# Patient Record
Sex: MALE | Race: WHITE | NOT HISPANIC OR LATINO | URBAN - METROPOLITAN AREA
[De-identification: names, ages, dates, MRNs, and addresses within clinical notes are randomized per-mention and may not be internally consistent; named-entity substitution may affect disease eponyms.]

---

## 2017-10-03 ENCOUNTER — INPATIENT (INPATIENT)
Facility: HOSPITAL | Age: 82
LOS: 1 days | Discharge: HOME HEALTH | DRG: 292 | End: 2017-10-05
Payer: MEDICARE

## 2017-10-03 VITALS
WEIGHT: 240.08 LBS | TEMPERATURE: 99 F | HEART RATE: 78 BPM | OXYGEN SATURATION: 98 % | RESPIRATION RATE: 22 BRPM | SYSTOLIC BLOOD PRESSURE: 233 MMHG | HEIGHT: 65 IN | DIASTOLIC BLOOD PRESSURE: 100 MMHG

## 2017-10-03 DIAGNOSIS — I50.9 HEART FAILURE, UNSPECIFIED: ICD-10-CM

## 2017-10-03 DIAGNOSIS — Z98.890 OTHER SPECIFIED POSTPROCEDURAL STATES: Chronic | ICD-10-CM

## 2017-10-03 DIAGNOSIS — I10 ESSENTIAL (PRIMARY) HYPERTENSION: ICD-10-CM

## 2017-10-03 LAB
ALBUMIN SERPL ELPH-MCNC: 4 G/DL — SIGNIFICANT CHANGE UP (ref 3.3–5.2)
ALP SERPL-CCNC: 99 U/L — SIGNIFICANT CHANGE UP (ref 40–120)
ALT FLD-CCNC: 12 U/L — SIGNIFICANT CHANGE UP
ANION GAP SERPL CALC-SCNC: 12 MMOL/L — SIGNIFICANT CHANGE UP (ref 5–17)
APTT BLD: 29.7 SEC — SIGNIFICANT CHANGE UP (ref 27.5–37.4)
AST SERPL-CCNC: 16 U/L — SIGNIFICANT CHANGE UP
BASOPHILS # BLD AUTO: 0 K/UL — SIGNIFICANT CHANGE UP (ref 0–0.2)
BASOPHILS NFR BLD AUTO: 0.6 % — SIGNIFICANT CHANGE UP (ref 0–2)
BILIRUB SERPL-MCNC: 0.4 MG/DL — SIGNIFICANT CHANGE UP (ref 0.4–2)
BUN SERPL-MCNC: 25 MG/DL — HIGH (ref 8–20)
CALCIUM SERPL-MCNC: 8.8 MG/DL — SIGNIFICANT CHANGE UP (ref 8.6–10.2)
CHLORIDE SERPL-SCNC: 106 MMOL/L — SIGNIFICANT CHANGE UP (ref 98–107)
CO2 SERPL-SCNC: 24 MMOL/L — SIGNIFICANT CHANGE UP (ref 22–29)
CREAT SERPL-MCNC: 1.62 MG/DL — HIGH (ref 0.5–1.3)
EOSINOPHIL # BLD AUTO: 0.1 K/UL — SIGNIFICANT CHANGE UP (ref 0–0.5)
EOSINOPHIL NFR BLD AUTO: 1.1 % — SIGNIFICANT CHANGE UP (ref 0–5)
GLUCOSE SERPL-MCNC: 115 MG/DL — SIGNIFICANT CHANGE UP (ref 70–115)
HCT VFR BLD CALC: 42.3 % — SIGNIFICANT CHANGE UP (ref 42–52)
HGB BLD-MCNC: 13.4 G/DL — LOW (ref 14–18)
INR BLD: 0.99 RATIO — SIGNIFICANT CHANGE UP (ref 0.88–1.16)
LYMPHOCYTES # BLD AUTO: 1.6 K/UL — SIGNIFICANT CHANGE UP (ref 1–4.8)
LYMPHOCYTES # BLD AUTO: 24.6 % — SIGNIFICANT CHANGE UP (ref 20–55)
MCHC RBC-ENTMCNC: 30.9 PG — SIGNIFICANT CHANGE UP (ref 27–31)
MCHC RBC-ENTMCNC: 31.7 G/DL — LOW (ref 32–36)
MCV RBC AUTO: 97.5 FL — HIGH (ref 80–94)
MONOCYTES # BLD AUTO: 0.7 K/UL — SIGNIFICANT CHANGE UP (ref 0–0.8)
MONOCYTES NFR BLD AUTO: 10.8 % — HIGH (ref 3–10)
NEUTROPHILS # BLD AUTO: 4.2 K/UL — SIGNIFICANT CHANGE UP (ref 1.8–8)
NEUTROPHILS NFR BLD AUTO: 62.4 % — SIGNIFICANT CHANGE UP (ref 37–73)
NT-PROBNP SERPL-SCNC: 3792 PG/ML — HIGH (ref 0–300)
PLATELET # BLD AUTO: 272 K/UL — SIGNIFICANT CHANGE UP (ref 150–400)
POTASSIUM SERPL-MCNC: 4.6 MMOL/L — SIGNIFICANT CHANGE UP (ref 3.5–5.3)
POTASSIUM SERPL-SCNC: 4.6 MMOL/L — SIGNIFICANT CHANGE UP (ref 3.5–5.3)
PROT SERPL-MCNC: 7.3 G/DL — SIGNIFICANT CHANGE UP (ref 6.6–8.7)
PROTHROM AB SERPL-ACNC: 10.9 SEC — SIGNIFICANT CHANGE UP (ref 9.8–12.7)
RBC # BLD: 4.34 M/UL — LOW (ref 4.6–6.2)
RBC # FLD: 14.3 % — SIGNIFICANT CHANGE UP (ref 11–15.6)
SODIUM SERPL-SCNC: 142 MMOL/L — SIGNIFICANT CHANGE UP (ref 135–145)
TROPONIN T SERPL-MCNC: 0.02 NG/ML — SIGNIFICANT CHANGE UP (ref 0–0.06)
WBC # BLD: 6.7 K/UL — SIGNIFICANT CHANGE UP (ref 4.8–10.8)
WBC # FLD AUTO: 6.7 K/UL — SIGNIFICANT CHANGE UP (ref 4.8–10.8)

## 2017-10-03 PROCEDURE — 93970 EXTREMITY STUDY: CPT | Mod: 26

## 2017-10-03 PROCEDURE — 99223 1ST HOSP IP/OBS HIGH 75: CPT

## 2017-10-03 PROCEDURE — 76775 US EXAM ABDO BACK WALL LIM: CPT | Mod: 26

## 2017-10-03 PROCEDURE — 99285 EMERGENCY DEPT VISIT HI MDM: CPT

## 2017-10-03 PROCEDURE — 71010: CPT | Mod: 26

## 2017-10-03 PROCEDURE — 93010 ELECTROCARDIOGRAM REPORT: CPT

## 2017-10-03 RX ORDER — SIMVASTATIN 20 MG/1
20 TABLET, FILM COATED ORAL AT BEDTIME
Qty: 0 | Refills: 0 | Status: DISCONTINUED | OUTPATIENT
Start: 2017-10-03 | End: 2017-10-05

## 2017-10-03 RX ORDER — CARVEDILOL PHOSPHATE 80 MG/1
3.12 CAPSULE, EXTENDED RELEASE ORAL EVERY 12 HOURS
Qty: 0 | Refills: 0 | Status: DISCONTINUED | OUTPATIENT
Start: 2017-10-03 | End: 2017-10-04

## 2017-10-03 RX ORDER — FUROSEMIDE 40 MG
40 TABLET ORAL ONCE
Qty: 0 | Refills: 0 | Status: COMPLETED | OUTPATIENT
Start: 2017-10-03 | End: 2017-10-03

## 2017-10-03 RX ORDER — METOPROLOL TARTRATE 50 MG
50 TABLET ORAL DAILY
Qty: 0 | Refills: 0 | Status: DISCONTINUED | OUTPATIENT
Start: 2017-10-03 | End: 2017-10-03

## 2017-10-03 RX ORDER — HEPARIN SODIUM 5000 [USP'U]/ML
5000 INJECTION INTRAVENOUS; SUBCUTANEOUS EVERY 12 HOURS
Qty: 0 | Refills: 0 | Status: DISCONTINUED | OUTPATIENT
Start: 2017-10-03 | End: 2017-10-05

## 2017-10-03 RX ORDER — ACETAMINOPHEN 500 MG
650 TABLET ORAL EVERY 6 HOURS
Qty: 0 | Refills: 0 | Status: DISCONTINUED | OUTPATIENT
Start: 2017-10-03 | End: 2017-10-05

## 2017-10-03 RX ORDER — INFLUENZA VIRUS VACCINE 15; 15; 15; 15 UG/.5ML; UG/.5ML; UG/.5ML; UG/.5ML
0.5 SUSPENSION INTRAMUSCULAR ONCE
Qty: 0 | Refills: 0 | Status: COMPLETED | OUTPATIENT
Start: 2017-10-03 | End: 2017-10-05

## 2017-10-03 RX ORDER — POTASSIUM CHLORIDE 20 MEQ
20 PACKET (EA) ORAL DAILY
Qty: 0 | Refills: 0 | Status: DISCONTINUED | OUTPATIENT
Start: 2017-10-03 | End: 2017-10-05

## 2017-10-03 RX ORDER — FUROSEMIDE 40 MG
0 TABLET ORAL
Qty: 0 | Refills: 0 | COMMUNITY

## 2017-10-03 RX ORDER — ISOSORBIDE MONONITRATE 60 MG/1
30 TABLET, EXTENDED RELEASE ORAL DAILY
Qty: 0 | Refills: 0 | Status: DISCONTINUED | OUTPATIENT
Start: 2017-10-03 | End: 2017-10-05

## 2017-10-03 RX ORDER — ASPIRIN/CALCIUM CARB/MAGNESIUM 325 MG
325 TABLET ORAL DAILY
Qty: 0 | Refills: 0 | Status: DISCONTINUED | OUTPATIENT
Start: 2017-10-03 | End: 2017-10-05

## 2017-10-03 RX ORDER — FUROSEMIDE 40 MG
40 TABLET ORAL EVERY 12 HOURS
Qty: 0 | Refills: 0 | Status: DISCONTINUED | OUTPATIENT
Start: 2017-10-03 | End: 2017-10-05

## 2017-10-03 RX ORDER — HYDRALAZINE HCL 50 MG
10 TABLET ORAL EVERY 12 HOURS
Qty: 0 | Refills: 0 | Status: DISCONTINUED | OUTPATIENT
Start: 2017-10-03 | End: 2017-10-05

## 2017-10-03 RX ADMIN — ISOSORBIDE MONONITRATE 30 MILLIGRAM(S): 60 TABLET, EXTENDED RELEASE ORAL at 18:21

## 2017-10-03 RX ADMIN — HEPARIN SODIUM 5000 UNIT(S): 5000 INJECTION INTRAVENOUS; SUBCUTANEOUS at 18:22

## 2017-10-03 RX ADMIN — Medication 40 MILLIGRAM(S): at 13:54

## 2017-10-03 RX ADMIN — CARVEDILOL PHOSPHATE 3.12 MILLIGRAM(S): 80 CAPSULE, EXTENDED RELEASE ORAL at 18:21

## 2017-10-03 RX ADMIN — Medication 40 MILLIGRAM(S): at 18:22

## 2017-10-03 RX ADMIN — SIMVASTATIN 20 MILLIGRAM(S): 20 TABLET, FILM COATED ORAL at 23:32

## 2017-10-03 RX ADMIN — Medication 10 MILLIGRAM(S): at 18:21

## 2017-10-03 NOTE — ED PROVIDER NOTE - PROGRESS NOTE DETAILS
Dr Person rec admit for diuresis and echo and Dr Dumont case discussed and she will admit 4 tower and direct further care 4 tower

## 2017-10-03 NOTE — ED ADULT NURSE NOTE - CHIEF COMPLAINT QUOTE
BIBA from home c/o  " heart palpitations"  this morning " my chest feels heavy"  . As per family pt lives  alone at home , stopped taking b/p meds two weeks ago , hypertensive in /100, + SOB, family requesting DNR/DNI

## 2017-10-03 NOTE — H&P ADULT - ATTENDING COMMENTS
PT consult prior to discharge as patient lives alone. Plan of care discussed with patient and family at bedside.

## 2017-10-03 NOTE — ED PROVIDER NOTE - MUSCULOSKELETAL, MLM
Spine appears normal, range of motion is not limited, no muscle or joint tenderness 4 plus leg lymphedema which seems chronic with crusted skin

## 2017-10-03 NOTE — H&P ADULT - HISTORY OF PRESENT ILLNESS
89 yr old male with hypertension and CHF presents with worsening shortness of breath for 2 weeks, worse over the last 2 days. States that his legs were getting swollen and last night could barely breath and had to sit in a chair due to shortness of breath. No chest pain, states he was short of breath just by walking to the bathroom from his bed, which is a few feet. No palpitations. Has been having leg swelling for 2 weeks. He reports stopping Lasix about 4 weeks ago as he had to be up all night to go to the bathroom. No fever, chills, nausea, vomiting, abdominal pain. Lives alone, uses a walker at home for ambulation.    PMD: DEYANIRA Santa

## 2017-10-03 NOTE — ED ADULT NURSE NOTE - OBJECTIVE STATEMENT
PT BIBA c/o chest pressure that started last night. pt states he walked to the bathroom last night and was SOB and was having a hard time breathing, Pt states he sat outside to get some air and then when he was able to catch his breath went back in. Pt states he continued to have chest  pressure and this morning family called ambulance. Pt states he has been non - compliant with meds for over two weeks, states he ran out of meds and decided since he is already 89 why bother to continue to take them. Pt is A & Ox3, pt's respirations are even however pt SOB when talking. Pt has bilateral lower extremity edema with the "orange rind" appearance, skin color is pale with pinkish/red areas. Pt evaluated by MD waiting for test results.

## 2017-10-03 NOTE — CONSULT NOTE ADULT - ASSESSMENT
89 yo male with HF, renal insufficiency, left and right sided failure and murmur of aortic valve stenosis.   His BP is elevated. He is not compliant with meds.  CXR with bilateral pleural effusion, cardiomegaly and PVC    1. Start diuretic therapy  2. I/Os  3. Serrano and daily weight  4. TTE  5. Renal US  6. Start Imdur and hydralazine for now. add coreg.   7. Lower ext venous Duplex and DVT prophylaxis

## 2017-10-03 NOTE — ED PROVIDER NOTE - OBJECTIVE STATEMENT
89 y/o male states he has ah/o htn and chronic leg edema and he was on atenolol and lasix and potassium and he stopped it 2 weeks ago because he did not want to get up at night to urinate and over the past 3 days he has progressive sob and today cp 89 y/o male states he has ah/o htn and chronic leg edema and he was on atenolol and lasix and potassium and he stopped it 2 weeks ago because he did not want to get up at night to urinate and over the past 3 days he has progressive sob and today cp which is a little better now

## 2017-10-03 NOTE — H&P ADULT - ASSESSMENT
89 yr old male with hypertension and CHF presents with worsening shortness of breath for 2 weeks, worse over last 2 days, with leg swelling. + Orthopnea. Labs and imaging noted, + pleural effusions on CXR.

## 2017-10-03 NOTE — ED ADULT TRIAGE NOTE - CHIEF COMPLAINT QUOTE
BIBA from home c/o  " heart palpitations"  this morning " my chest feels heavy"  . As per family pt lives  alone at home , stopped taking b/p meds two weeks ago , hypertensive in /100, + SOB, family requesting DNR/DNI BIBA from home c/o  " heart palpitations"  this morning " my chest feels heavy"  . As per family pt lives  alone at home , stopped taking b/p meds two weeks ago , hypertensive in /100, + SOB, b/l lower extremity edema with non draining blisters ,  family requesting DNR/DNI

## 2017-10-03 NOTE — CONSULT NOTE ADULT - SUBJECTIVE AND OBJECTIVE BOX
Patient is a 89y old  Male who presents with a chief complaint of SOB, PND and leg edema    HPI:  89 yo male with hypertension, chronic venous insufficiency, not compliant with medications.  He has been weaker over the past few months, Walks with a walker. Stop taking meds since he was going to bathroom too many times.   Early this AM while walking to bathroom and back, only a few step started to feel short of breath, needed to sit in the chairs and had chest tightness. He came to ED, has received IV lasix, urinated and feels better. His legs are getting larger. Difficult to ambulate. In ED /100. Now BP is 168/70.     PAST MEDICAL & SURGICAL HISTORY:  HTN (hypertension)  CHF (congestive heart failure)  No significant past surgical history    Allergies  No Known Allergies    MEDICATIONS:  Not taking any meds at home    FAMILY HISTORY:  [place]    SOCIAL HISTORY:  Stopped smoking 75 years ago. no etoh or drug use    REVIEW OF SYSTEMS:  CONSTITUTIONAL: No fever, - weight loss, + fatigue  EYES: No eye pain, visual disturbances, or discharge  ENMT:  + difficulty hearing,  - tinnitus, vertigo; No sinus or throat pain  NECK: No pain or stiffness  RESPIRATORY: as per HPI, + cough, no sputum production   CARDIOVASCULAR: No chest pain, palpitations, passing out, dizziness, + leg swelling  GASTROINTESTINAL: No abdominal or epigastric pain. No nausea, vomiting, or hematemesis; No diarrhea or constipation. No melena or hematochezia.  GENITOURINARY: No dysuria, frequency, hematuria, + incontinence  NEUROLOGICAL: No headaches, memory loss, loss of strength, numbness, or tremors  SKIN: No itching, burning, rashes, or lesions   LYMPH Nodes: No enlarged glands  ENDOCRINE: No heat or cold intolerance; No hair loss  MUSCULOSKELETAL: + joint pain and swelling  PSYCHIATRIC: No depression, anxiety, mood swings, or difficulty sleeping  HEME/LYMPH: No easy bruising, or bleeding gums  ALLERY AND IMMUNOLOGIC: No hives or eczema	    Vital Signs Last 24 Hrs  T(C): 37 (03 Oct 2017 12:17), Max: 37 (03 Oct 2017 12:17)  T(F): 98.6 (03 Oct 2017 12:17), Max: 98.6 (03 Oct 2017 12:17)  HR: 80 (03 Oct 2017 12:45) (78 - 80)  BP: 179/79 (03 Oct 2017 12:45) (179/79 - 233/100)  BP(mean): --  RR: 18 (03 Oct 2017 12:45) (18 - 22)  SpO2: 99% (03 Oct 2017 12:45) (98% - 99%)    Daily Height in cm: 165.1 (03 Oct 2017 12:17)    Daily     PHYSICAL EXAM:  Appearance: Normal, well nourished	  HEENT:   Normal oral mucosa, PERRL, EOMI, sclera non-icteric	  Lymphatic: No cervical lymphadenopathy  Cardiovascular: Normal S1 S2, 3/6 sm right second ics, tense edema mid calf, skin changes c.w. chronic venous stasis.   Respiratory: Lungs clear to auscultation	  Psychiatry: A & O x 3, Mood & affect appropriate  Gastrointestinal:  Soft, Non-tender, + BS, no bruits	  Skin: No rashes, No ecchymoses, No cyanosis  Neurologic: Grossly non-focal with full strength in all four extremities  Extremities: Normal range of motion, No clubbing  Vascular: Peripheral pulses palpable 2+ bilaterally    ECG: NSR 82 BPM, 82 BPM, non-specific ST changes.     LABS:                      13.4   6.7   )-----------( 272      ( 03 Oct 2017 12:38 )             42.3     10-03    142  |  106  |  25.0<H>  ----------------------------<  115  4.6   |  24.0  |  1.62<H>    Ca    8.8      03 Oct 2017 12:38    TPro  7.3  /  Alb  4.0  /  TBili  0.4  /  DBili  x   /  AST  16  /  ALT  12  /  AlkPhos  99  10-03    CARDIAC MARKERS ( 03 Oct 2017 12:38 )  x     / 0.02 ng/mL / x     / x     / x          PT/INR - ( 03 Oct 2017 12:38 )   PT: 10.9 sec;   INR: 0.99 ratio     PTT - ( 03 Oct 2017 12:38 )  PTT:29.7 sec    BNPSerum Pro-Brain Natriuretic Peptide: 3792 pg/mL (10-03 @ 12:38)  Serum Pro-Brain Natriuretic Peptide: 3792 pg/mL (10-03-17 @ 12:38)    RADIOLOGY & ADDITIONAL STUDIES:

## 2017-10-03 NOTE — ED ADULT NURSE REASSESSMENT NOTE - NS ED NURSE REASSESS COMMENT FT1
Pt medicated as per MD orders, tolerated well. Pt provided urinal. Pt's family remains at bedside and pt offers no complaints at this time. Will continue to monitor.

## 2017-10-03 NOTE — H&P ADULT - PROBLEM SELECTOR PLAN 1
Strict I/O, daily weights. Lasix IV Q 12hrly, cardiology consulted by ED. ECHO, start aspirin and statin. Supplemental oxygen as needed. Monitor renal function and electrolytes.

## 2017-10-04 DIAGNOSIS — N17.9 ACUTE KIDNEY FAILURE, UNSPECIFIED: ICD-10-CM

## 2017-10-04 LAB
ANION GAP SERPL CALC-SCNC: 14 MMOL/L — SIGNIFICANT CHANGE UP (ref 5–17)
BASOPHILS # BLD AUTO: 0 K/UL — SIGNIFICANT CHANGE UP (ref 0–0.2)
BASOPHILS NFR BLD AUTO: 0.3 % — SIGNIFICANT CHANGE UP (ref 0–2)
BUN SERPL-MCNC: 30 MG/DL — HIGH (ref 8–20)
CALCIUM SERPL-MCNC: 8.6 MG/DL — SIGNIFICANT CHANGE UP (ref 8.6–10.2)
CHLORIDE SERPL-SCNC: 104 MMOL/L — SIGNIFICANT CHANGE UP (ref 98–107)
CHOLEST SERPL-MCNC: 225 MG/DL — HIGH (ref 110–199)
CO2 SERPL-SCNC: 24 MMOL/L — SIGNIFICANT CHANGE UP (ref 22–29)
CREAT SERPL-MCNC: 1.7 MG/DL — HIGH (ref 0.5–1.3)
EOSINOPHIL # BLD AUTO: 0.1 K/UL — SIGNIFICANT CHANGE UP (ref 0–0.5)
EOSINOPHIL NFR BLD AUTO: 1.1 % — SIGNIFICANT CHANGE UP (ref 0–5)
GLUCOSE SERPL-MCNC: 104 MG/DL — SIGNIFICANT CHANGE UP (ref 70–115)
HCT VFR BLD CALC: 38.8 % — LOW (ref 42–52)
HDLC SERPL-MCNC: 54 MG/DL — LOW
HGB BLD-MCNC: 12.4 G/DL — LOW (ref 14–18)
INR BLD: 0.98 RATIO — SIGNIFICANT CHANGE UP (ref 0.88–1.16)
LIPID PNL WITH DIRECT LDL SERPL: 150 MG/DL — SIGNIFICANT CHANGE UP
LYMPHOCYTES # BLD AUTO: 1.2 K/UL — SIGNIFICANT CHANGE UP (ref 1–4.8)
LYMPHOCYTES # BLD AUTO: 15.3 % — LOW (ref 20–55)
MAGNESIUM SERPL-MCNC: 2 MG/DL — SIGNIFICANT CHANGE UP (ref 1.6–2.6)
MCHC RBC-ENTMCNC: 30.8 PG — SIGNIFICANT CHANGE UP (ref 27–31)
MCHC RBC-ENTMCNC: 32 G/DL — SIGNIFICANT CHANGE UP (ref 32–36)
MCV RBC AUTO: 96.3 FL — HIGH (ref 80–94)
MONOCYTES # BLD AUTO: 1.1 K/UL — HIGH (ref 0–0.8)
MONOCYTES NFR BLD AUTO: 14.6 % — HIGH (ref 3–10)
NEUTROPHILS # BLD AUTO: 5.2 K/UL — SIGNIFICANT CHANGE UP (ref 1.8–8)
NEUTROPHILS NFR BLD AUTO: 68.4 % — SIGNIFICANT CHANGE UP (ref 37–73)
PHOSPHATE SERPL-MCNC: 3.4 MG/DL — SIGNIFICANT CHANGE UP (ref 2.4–4.7)
PLATELET # BLD AUTO: 252 K/UL — SIGNIFICANT CHANGE UP (ref 150–400)
POTASSIUM SERPL-MCNC: 4.2 MMOL/L — SIGNIFICANT CHANGE UP (ref 3.5–5.3)
POTASSIUM SERPL-SCNC: 4.2 MMOL/L — SIGNIFICANT CHANGE UP (ref 3.5–5.3)
PROTHROM AB SERPL-ACNC: 10.8 SEC — SIGNIFICANT CHANGE UP (ref 9.8–12.7)
RBC # BLD: 4.03 M/UL — LOW (ref 4.6–6.2)
RBC # FLD: 14.3 % — SIGNIFICANT CHANGE UP (ref 11–15.6)
SODIUM SERPL-SCNC: 142 MMOL/L — SIGNIFICANT CHANGE UP (ref 135–145)
T3 SERPL-MCNC: 65 NG/DL — LOW (ref 80–200)
T4 AB SER-ACNC: 3.6 UG/DL — LOW (ref 4.5–12)
TOTAL CHOLESTEROL/HDL RATIO MEASUREMENT: 4 RATIO — SIGNIFICANT CHANGE UP (ref 3.4–9.6)
TRIGL SERPL-MCNC: 104 MG/DL — SIGNIFICANT CHANGE UP (ref 10–200)
WBC # BLD: 7.6 K/UL — SIGNIFICANT CHANGE UP (ref 4.8–10.8)
WBC # FLD AUTO: 7.6 K/UL — SIGNIFICANT CHANGE UP (ref 4.8–10.8)

## 2017-10-04 PROCEDURE — 99233 SBSQ HOSP IP/OBS HIGH 50: CPT

## 2017-10-04 PROCEDURE — 99232 SBSQ HOSP IP/OBS MODERATE 35: CPT

## 2017-10-04 RX ORDER — LEVOTHYROXINE SODIUM 125 MCG
50 TABLET ORAL DAILY
Qty: 0 | Refills: 0 | Status: DISCONTINUED | OUTPATIENT
Start: 2017-10-04 | End: 2017-10-05

## 2017-10-04 RX ORDER — CARVEDILOL PHOSPHATE 80 MG/1
6.25 CAPSULE, EXTENDED RELEASE ORAL EVERY 12 HOURS
Qty: 0 | Refills: 0 | Status: DISCONTINUED | OUTPATIENT
Start: 2017-10-04 | End: 2017-10-05

## 2017-10-04 RX ADMIN — CARVEDILOL PHOSPHATE 3.12 MILLIGRAM(S): 80 CAPSULE, EXTENDED RELEASE ORAL at 06:09

## 2017-10-04 RX ADMIN — HEPARIN SODIUM 5000 UNIT(S): 5000 INJECTION INTRAVENOUS; SUBCUTANEOUS at 06:09

## 2017-10-04 RX ADMIN — ISOSORBIDE MONONITRATE 30 MILLIGRAM(S): 60 TABLET, EXTENDED RELEASE ORAL at 11:21

## 2017-10-04 RX ADMIN — Medication 40 MILLIGRAM(S): at 06:09

## 2017-10-04 RX ADMIN — Medication 20 MILLIEQUIVALENT(S): at 11:21

## 2017-10-04 RX ADMIN — SIMVASTATIN 20 MILLIGRAM(S): 20 TABLET, FILM COATED ORAL at 21:52

## 2017-10-04 RX ADMIN — Medication 10 MILLIGRAM(S): at 06:09

## 2017-10-04 RX ADMIN — Medication 40 MILLIGRAM(S): at 17:15

## 2017-10-04 RX ADMIN — Medication 10 MILLIGRAM(S): at 17:16

## 2017-10-04 RX ADMIN — HEPARIN SODIUM 5000 UNIT(S): 5000 INJECTION INTRAVENOUS; SUBCUTANEOUS at 17:15

## 2017-10-04 RX ADMIN — CARVEDILOL PHOSPHATE 6.25 MILLIGRAM(S): 80 CAPSULE, EXTENDED RELEASE ORAL at 17:15

## 2017-10-04 NOTE — PROGRESS NOTE ADULT - SUBJECTIVE AND OBJECTIVE BOX
CHIEF COMPLAINT: Patient is a 89y old  Male who presents with a chief complaint of shortness of breath.   He is diuresing .no cp, pnd or orthopnea over night   No DVT on US  Medical renal dz on renal us    Allergies  No Known Allergies    MEDICATIONS:  carvedilol 3.125 milliGRAM(s) Oral every 12 hours  furosemide   Injectable 40 milliGRAM(s) IV Push every 12 hours  hydrALAZINE 10 milliGRAM(s) Oral every 12 hours  isosorbide   mononitrate ER Tablet (IMDUR) 30 milliGRAM(s) Oral daily  acetaminophen   Tablet 650 milliGRAM(s) Oral every 6 hours PRN  acetaminophen   Tablet. 650 milliGRAM(s) Oral every 6 hours PRN  aspirin buffered 325 milliGRAM(s) Oral daily  levothyroxine 50 MICROGram(s) Oral daily  simvastatin 20 milliGRAM(s) Oral at bedtime  heparin  Injectable 5000 Unit(s) SubCutaneous every 12 hours  influenza   Vaccine 0.5 milliLiter(s) IntraMuscular once  potassium chloride    Tablet ER 20 milliEquivalent(s) Oral daily    PHYSICAL EXAM:  T(C): 37 (10-04-17 @ 06:09), Max: 37.2 (10-03-17 @ 21:59)  HR: 73 (10-04-17 @ 06:09) (70 - 88)  BP: 140/70 (10-04-17 @ 06:09) (133/69 - 233/100)  RR: 18 (10-04-17 @ 06:09) (18 - 29)  SpO2: 100% (10-04-17 @ 06:09) (98% - 100%)  Wt(kg): --    I&O's Summary    03 Oct 2017 07:01  -  04 Oct 2017 07:00  --------------------------------------------------------  IN: 0 mL / OUT: 800 mL / NET: -800 mL        Daily Height in cm: 165.1 (03 Oct 2017 12:17)    Daily Weight in k (04 Oct 2017 01:54)  Appearance: Normal	  HEENT:   NC/AT  Eye: Pink Conjunctiva  Lungs: CTA B/L  CVS: RRR, Normal S1 and S2, No Edema  Pulses: Normal distal pulses.  Neuro: A&O x3    TELEMETRY: nsr, apc, pvc    LABS:	 	                      12.4   7.6   )-----------( 252      ( 04 Oct 2017 05:36 )             38.8     10-04    142  |  104  |  30.0<H>  ----------------------------<  104  4.2   |  24.0  |  1.70<H>    Ca    8.6      04 Oct 2017 05:36  Phos  3.4     10-04  Mg     2.0     10-04    TPro  7.3  /  Alb  4.0  /  TBili  0.4  /  DBili  x   /  AST  16  /  ALT  12  /  AlkPhos  99  10-03    proBNP: Serum Pro-Brain Natriuretic Peptide: 3792 pg/mL (10-03 @ 12:38)    TSH: Thyroid Stimulating Hormone, Serum: 4.27 uIU/mL (10-03 @ 12:38)    ASSESSMENT/PLAN:

## 2017-10-04 NOTE — PROGRESS NOTE ADULT - PROBLEM SELECTOR PLAN 1
Feels better, continue IV Lasix, continue Coreg, Imdur, aspirin and statin. Cardiology follow up noted. Awaiting ECHO.

## 2017-10-04 NOTE — PROGRESS NOTE ADULT - ASSESSMENT
89 yr old male with hypertension and CHF presents with worsening shortness of breath for 2 weeks, worse over last 2 days, with leg swelling and orthopnea. Noted to have pleural effusions on CXR. He was started on IV Lasix. Patient was not compliant for Lasix for over 4 weeks. Cardiology consulted, he was started on Coreg and Imdur. ECHO ordered. Noted to have elevated D dimer, lower extremity US was done, negative for DVT.

## 2017-10-04 NOTE — PROGRESS NOTE ADULT - SUBJECTIVE AND OBJECTIVE BOX
INTERVAL HPI/OVERNIGHT EVENTS:    CC: acute CHF, hypothyroid, hypertension, ? MATILDE and CKD    Feels better, less short of breath    Vital Signs Last 24 Hrs  T(C): 37 (04 Oct 2017 06:09), Max: 37.2 (03 Oct 2017 21:59)  T(F): 98.6 (04 Oct 2017 06:09), Max: 98.9 (03 Oct 2017 21:59)  HR: 73 (04 Oct 2017 06:09) (70 - 88)  BP: 140/70 (04 Oct 2017 06:09) (133/69 - 233/100)  BP(mean): --  RR: 18 (04 Oct 2017 06:09) (18 - 29)  SpO2: 100% (04 Oct 2017 06:09) (98% - 100%)    PHYSICAL EXAM:    GENERAL: Not in distress, alert  CHEST/LUNG: b/l air entry, decreased in bases  HEART: Regular   ABDOMEN: Soft, BS+  EXTREMITIES:  3+ edema, chronic dry skin changes    MEDICATIONS  (STANDING):  aspirin buffered 325 milliGRAM(s) Oral daily  carvedilol 6.25 milliGRAM(s) Oral every 12 hours  furosemide   Injectable 40 milliGRAM(s) IV Push every 12 hours  heparin  Injectable 5000 Unit(s) SubCutaneous every 12 hours  hydrALAZINE 10 milliGRAM(s) Oral every 12 hours  influenza   Vaccine 0.5 milliLiter(s) IntraMuscular once  isosorbide   mononitrate ER Tablet (IMDUR) 30 milliGRAM(s) Oral daily  levothyroxine 50 MICROGram(s) Oral daily  potassium chloride    Tablet ER 20 milliEquivalent(s) Oral daily  simvastatin 20 milliGRAM(s) Oral at bedtime    MEDICATIONS  (PRN):  acetaminophen   Tablet 650 milliGRAM(s) Oral every 6 hours PRN For Temp greater than 38 C (100.4 F)  acetaminophen   Tablet. 650 milliGRAM(s) Oral every 6 hours PRN Moderate Pain (4 - 6)      Allergies    No Known Allergies    Intolerances          LABS:                          12.4   7.6   )-----------( 252      ( 04 Oct 2017 05:36 )             38.8     10-04    142  |  104  |  30.0<H>  ----------------------------<  104  4.2   |  24.0  |  1.70<H>    Ca    8.6      04 Oct 2017 05:36  Phos  3.4     10-04  Mg     2.0     10-04    TPro  7.3  /  Alb  4.0  /  TBili  0.4  /  DBili  x   /  AST  16  /  ALT  12  /  AlkPhos  99  10-03    PT/INR - ( 04 Oct 2017 05:36 )   PT: 10.8 sec;   INR: 0.98 ratio         PTT - ( 03 Oct 2017 12:38 )  PTT:29.7 sec      RADIOLOGY & ADDITIONAL TESTS:

## 2017-10-04 NOTE — PROGRESS NOTE ADULT - ASSESSMENT
89 yo male with CRI, volume overload and left and right sided failure.     Biventricular failure. cont with current meds  increase coreg  echo pending  Renal dz, Cr increasing, on more day of lasix IV bid.  monitor electrolytes.

## 2017-10-05 VITALS
SYSTOLIC BLOOD PRESSURE: 120 MMHG | OXYGEN SATURATION: 93 % | RESPIRATION RATE: 18 BRPM | TEMPERATURE: 98 F | HEART RATE: 83 BPM | DIASTOLIC BLOOD PRESSURE: 60 MMHG

## 2017-10-05 LAB
ANION GAP SERPL CALC-SCNC: 15 MMOL/L — SIGNIFICANT CHANGE UP (ref 5–17)
BUN SERPL-MCNC: 40 MG/DL — HIGH (ref 8–20)
CALCIUM SERPL-MCNC: 9.4 MG/DL — SIGNIFICANT CHANGE UP (ref 8.6–10.2)
CHLORIDE SERPL-SCNC: 104 MMOL/L — SIGNIFICANT CHANGE UP (ref 98–107)
CO2 SERPL-SCNC: 26 MMOL/L — SIGNIFICANT CHANGE UP (ref 22–29)
CREAT SERPL-MCNC: 1.9 MG/DL — HIGH (ref 0.5–1.3)
GLUCOSE SERPL-MCNC: 107 MG/DL — SIGNIFICANT CHANGE UP (ref 70–115)
MAGNESIUM SERPL-MCNC: 2.1 MG/DL — SIGNIFICANT CHANGE UP (ref 1.6–2.6)
POTASSIUM SERPL-MCNC: 4.4 MMOL/L — SIGNIFICANT CHANGE UP (ref 3.5–5.3)
POTASSIUM SERPL-SCNC: 4.4 MMOL/L — SIGNIFICANT CHANGE UP (ref 3.5–5.3)
SODIUM SERPL-SCNC: 145 MMOL/L — SIGNIFICANT CHANGE UP (ref 135–145)

## 2017-10-05 PROCEDURE — 90686 IIV4 VACC NO PRSV 0.5 ML IM: CPT

## 2017-10-05 PROCEDURE — 80061 LIPID PANEL: CPT

## 2017-10-05 PROCEDURE — 84436 ASSAY OF TOTAL THYROXINE: CPT

## 2017-10-05 PROCEDURE — 96374 THER/PROPH/DIAG INJ IV PUSH: CPT | Mod: XU

## 2017-10-05 PROCEDURE — 85730 THROMBOPLASTIN TIME PARTIAL: CPT

## 2017-10-05 PROCEDURE — 84100 ASSAY OF PHOSPHORUS: CPT

## 2017-10-05 PROCEDURE — 84484 ASSAY OF TROPONIN QUANT: CPT

## 2017-10-05 PROCEDURE — 99285 EMERGENCY DEPT VISIT HI MDM: CPT | Mod: 25

## 2017-10-05 PROCEDURE — 80053 COMPREHEN METABOLIC PANEL: CPT

## 2017-10-05 PROCEDURE — 83880 ASSAY OF NATRIURETIC PEPTIDE: CPT

## 2017-10-05 PROCEDURE — 85379 FIBRIN DEGRADATION QUANT: CPT

## 2017-10-05 PROCEDURE — G0378: CPT

## 2017-10-05 PROCEDURE — 84480 ASSAY TRIIODOTHYRONINE (T3): CPT

## 2017-10-05 PROCEDURE — 76775 US EXAM ABDO BACK WALL LIM: CPT

## 2017-10-05 PROCEDURE — 99232 SBSQ HOSP IP/OBS MODERATE 35: CPT

## 2017-10-05 PROCEDURE — 99239 HOSP IP/OBS DSCHRG MGMT >30: CPT

## 2017-10-05 PROCEDURE — 93005 ELECTROCARDIOGRAM TRACING: CPT

## 2017-10-05 PROCEDURE — 36415 COLL VENOUS BLD VENIPUNCTURE: CPT

## 2017-10-05 PROCEDURE — 84443 ASSAY THYROID STIM HORMONE: CPT

## 2017-10-05 PROCEDURE — 93970 EXTREMITY STUDY: CPT

## 2017-10-05 PROCEDURE — 80048 BASIC METABOLIC PNL TOTAL CA: CPT

## 2017-10-05 PROCEDURE — 85027 COMPLETE CBC AUTOMATED: CPT

## 2017-10-05 PROCEDURE — 83735 ASSAY OF MAGNESIUM: CPT

## 2017-10-05 PROCEDURE — 97163 PT EVAL HIGH COMPLEX 45 MIN: CPT

## 2017-10-05 PROCEDURE — 93306 TTE W/DOPPLER COMPLETE: CPT

## 2017-10-05 PROCEDURE — 85610 PROTHROMBIN TIME: CPT

## 2017-10-05 PROCEDURE — 71045 X-RAY EXAM CHEST 1 VIEW: CPT

## 2017-10-05 RX ORDER — CARVEDILOL PHOSPHATE 80 MG/1
1 CAPSULE, EXTENDED RELEASE ORAL
Qty: 60 | Refills: 0
Start: 2017-10-05 | End: 2017-11-04

## 2017-10-05 RX ORDER — SIMVASTATIN 20 MG/1
1 TABLET, FILM COATED ORAL
Qty: 30 | Refills: 0
Start: 2017-10-05 | End: 2017-11-04

## 2017-10-05 RX ORDER — LEVOTHYROXINE SODIUM 125 MCG
1 TABLET ORAL
Qty: 30 | Refills: 0
Start: 2017-10-05 | End: 2017-11-04

## 2017-10-05 RX ORDER — POTASSIUM CHLORIDE 20 MEQ
1 PACKET (EA) ORAL
Qty: 30 | Refills: 0
Start: 2017-10-05 | End: 2017-11-04

## 2017-10-05 RX ORDER — FUROSEMIDE 40 MG
1 TABLET ORAL
Qty: 0 | Refills: 0 | COMMUNITY

## 2017-10-05 RX ORDER — HYDRALAZINE HCL 50 MG
1 TABLET ORAL
Qty: 90 | Refills: 0
Start: 2017-10-05 | End: 2017-11-04

## 2017-10-05 RX ORDER — METOPROLOL TARTRATE 50 MG
1 TABLET ORAL
Qty: 0 | Refills: 0 | COMMUNITY

## 2017-10-05 RX ORDER — ISOSORBIDE MONONITRATE 60 MG/1
1 TABLET, EXTENDED RELEASE ORAL
Qty: 30 | Refills: 0
Start: 2017-10-05 | End: 2017-11-04

## 2017-10-05 RX ORDER — INFLUENZA VIRUS VACCINE 15; 15; 15; 15 UG/.5ML; UG/.5ML; UG/.5ML; UG/.5ML
0.5 SUSPENSION INTRAMUSCULAR ONCE
Qty: 0 | Refills: 0 | Status: COMPLETED | OUTPATIENT
Start: 2017-10-05 | End: 2017-10-05

## 2017-10-05 RX ORDER — HYDRALAZINE HCL 50 MG
25 TABLET ORAL EVERY 8 HOURS
Qty: 0 | Refills: 0 | Status: DISCONTINUED | OUTPATIENT
Start: 2017-10-05 | End: 2017-10-05

## 2017-10-05 RX ORDER — FUROSEMIDE 40 MG
1 TABLET ORAL
Qty: 30 | Refills: 0
Start: 2017-10-05 | End: 2017-11-04

## 2017-10-05 RX ORDER — FUROSEMIDE 40 MG
40 TABLET ORAL DAILY
Qty: 0 | Refills: 0 | Status: DISCONTINUED | OUTPATIENT
Start: 2017-10-05 | End: 2017-10-05

## 2017-10-05 RX ORDER — ASPIRIN/CALCIUM CARB/MAGNESIUM 324 MG
1 TABLET ORAL
Qty: 30 | Refills: 0
Start: 2017-10-05 | End: 2017-11-04

## 2017-10-05 RX ORDER — POTASSIUM CHLORIDE 20 MEQ
1 PACKET (EA) ORAL
Qty: 0 | Refills: 0 | COMMUNITY

## 2017-10-05 RX ADMIN — CARVEDILOL PHOSPHATE 6.25 MILLIGRAM(S): 80 CAPSULE, EXTENDED RELEASE ORAL at 17:33

## 2017-10-05 RX ADMIN — HEPARIN SODIUM 5000 UNIT(S): 5000 INJECTION INTRAVENOUS; SUBCUTANEOUS at 05:32

## 2017-10-05 RX ADMIN — ISOSORBIDE MONONITRATE 30 MILLIGRAM(S): 60 TABLET, EXTENDED RELEASE ORAL at 11:19

## 2017-10-05 RX ADMIN — Medication 20 MILLIEQUIVALENT(S): at 13:32

## 2017-10-05 RX ADMIN — Medication 40 MILLIGRAM(S): at 05:32

## 2017-10-05 RX ADMIN — Medication 50 MICROGRAM(S): at 05:32

## 2017-10-05 RX ADMIN — CARVEDILOL PHOSPHATE 6.25 MILLIGRAM(S): 80 CAPSULE, EXTENDED RELEASE ORAL at 05:31

## 2017-10-05 RX ADMIN — Medication 10 MILLIGRAM(S): at 05:32

## 2017-10-05 RX ADMIN — INFLUENZA VIRUS VACCINE 0.5 MILLILITER(S): 15; 15; 15; 15 SUSPENSION INTRAMUSCULAR at 14:30

## 2017-10-05 RX ADMIN — Medication 325 MILLIGRAM(S): at 17:50

## 2017-10-05 NOTE — DISCHARGE NOTE ADULT - NS AS DC HF EDUCATION INSTRUCTIONS
Activities as tolerated/Call Primary Care Provider for follow-up after discharge/Report weight gain of 2 or more pounds in one day or 3 or more pounds in one week, worsening shortness of breath, fatigue, weakness, increased swelling of hands and feet to primary care provider/Low salt diet/Monitor Weight Daily

## 2017-10-05 NOTE — DISCHARGE NOTE ADULT - PLAN OF CARE
Remain free of shortness of breath Continue Lasix, cardiology follow up. Continue medications as instructed. Continue Synthroid.

## 2017-10-05 NOTE — PROGRESS NOTE ADULT - ATTENDING COMMENTS
1.
Plan of care discussed with patient, RN and son. Stable for discharge home.
Plan of care discussed with patient.

## 2017-10-05 NOTE — PHYSICAL THERAPY INITIAL EVALUATION ADULT - PERTINENT HX OF CURRENT PROBLEM, REHAB EVAL
2 yr old male with hypertension and CHF presents with worsening shortness of breath for 2 weeks, worse over last 2 days, with leg swelling and orthopnea. Noted to have pleural effusions on CXR, BLE Dopplers negative for DVT, pt admitted for CHF

## 2017-10-05 NOTE — PROGRESS NOTE ADULT - SUBJECTIVE AND OBJECTIVE BOX
CHIEF COMPLAINT:Patient is a 89y old  Male who presents with a chief complaint of shortness of breath, Biventricular failure.  Leg edema and lymphedema.   Breathing is improved.     Allergies  No Known Allergies  	  MEDICATIONS:  carvedilol 6.25 milliGRAM(s) Oral every 12 hours  furosemide   Injectable 40 milliGRAM(s) IV Push every 12 hours  hydrALAZINE 10 milliGRAM(s) Oral every 12 hours  isosorbide   mononitrate ER Tablet (IMDUR) 30 milliGRAM(s) Oral daily  acetaminophen   Tablet 650 milliGRAM(s) Oral every 6 hours PRN  acetaminophen   Tablet. 650 milliGRAM(s) Oral every 6 hours PRN  aspirin buffered 325 milliGRAM(s) Oral daily  levothyroxine 50 MICROGram(s) Oral daily  simvastatin 20 milliGRAM(s) Oral at bedtime  heparin  Injectable 5000 Unit(s) SubCutaneous every 12 hours  influenza   Vaccine 0.5 milliLiter(s) IntraMuscular once  potassium chloride    Tablet ER 20 milliEquivalent(s) Oral daily    PHYSICAL EXAM:  T(C): 36.8 (10-05-17 @ 05:27), Max: 37 (10-04-17 @ 17:14)  HR: 70 (10-05-17 @ 05:27) (69 - 82)  BP: 175/72 (10-05-17 @ 05:27) (99/52 - 175/72)  RR: 18 (10-05-17 @ 05:27) (17 - 18)  SpO2: 97% (10-05-17 @ 05:27) (97% - 99%)  Wt(kg): --    I&O's Summary    04 Oct 2017 07:01  -  05 Oct 2017 07:00  --------------------------------------------------------  IN: 180 mL / OUT: 0 mL / NET: 180 mL    Appearance: Normal	  HEENT:   NC/AT  Eye: Pink Conjunctiva  Lungs: CTA B/L  CVS: RRR, Normal S1 and S2, lymphedema,; edema on thigh is improved.   Neuro: A&O x3    TELEMETRY: No events	                           12.4   7.6   )-----------( 252      ( 04 Oct 2017 05:36 )             38.8     10-05    145  |  104  |  40.0<H>  ----------------------------<  107  4.4   |  26.0  |  1.90<H>    Ca    9.4      05 Oct 2017 06:40  Phos  3.4     10-04  Mg     2.1     10-05    TPro  7.3  /  Alb  4.0  /  TBili  0.4  /  DBili  x   /  AST  16  /  ALT  12  /  AlkPhos  99  10-03    proBNP:   Lipid Profile:   HgA1c:   TSH:     ASSESSMENT/PLAN:

## 2017-10-05 NOTE — DISCHARGE NOTE ADULT - MEDICATION SUMMARY - MEDICATIONS TO TAKE
I will START or STAY ON the medications listed below when I get home from the hospital:    aspirin 325 mg oral delayed release tablet  -- 1 tab(s) by mouth once a day   -- Swallow whole.  Do not crush.  Take with food or milk.    -- Indication: For CHF (congestive heart failure)    isosorbide mononitrate 30 mg oral tablet, extended release  -- 1 tab(s) by mouth once a day  -- Indication: For CHF (congestive heart failure)    simvastatin 20 mg oral tablet  -- 1 tab(s) by mouth once a day (at bedtime)  -- Indication: For CHF (congestive heart failure)    carvedilol 6.25 mg oral tablet  -- 1 tab(s) by mouth every 12 hours  -- Indication: For CHF (congestive heart failure)    furosemide 40 mg oral tablet  -- 1 tab(s) by mouth once a day  -- Indication: For CHF (congestive heart failure)    potassium chloride 20 mEq oral tablet, extended release  -- 1 tab(s) by mouth once a day  -- Indication: For supplement    levothyroxine 50 mcg (0.05 mg) oral tablet  -- 1 tab(s) by mouth once a day  -- Indication: For Hypothyroid    hydrALAZINE 25 mg oral tablet  -- 1 tab(s) by mouth every 8 hours  -- Indication: For HTN (hypertension)

## 2017-10-05 NOTE — DISCHARGE NOTE ADULT - HOSPITAL COURSE
89 yr old male with hypertension and CHF presents with worsening shortness of breath for 2 weeks, worse over last 2 days, with leg swelling and orthopnea. Noted to have pleural effusions on CXR. He was started on IV Lasix. Patient was not compliant for Lasix for over 4 weeks. Cardiology consulted, he was started on Coreg and Imdur. ECHO ordered. Noted to have elevated D dimer, lower extremity US was done, negative for DVT. His ECHO was suggestive of diastolic CHF. His symptoms improved and Lasix was changed to PO. Antihypertensives were adjusted. PT evaluation was advised [facility], but patient insisted on going home.  Home care was arranged. Per PMD his baseline creatinine is at 1.8. His course was otherwise uneventful and he is stable for discharge home.    Spent > 35 mins in discharge plan and documentation. 89 yr old male with hypertension and CHF presents with worsening shortness of breath for 2 weeks, worse over last 2 days, with leg swelling and orthopnea. Noted to have pleural effusions on CXR. He was started on IV Lasix. Patient was not compliant for Lasix for over 4 weeks. Cardiology consulted, he was started on Coreg and Imdur. ECHO ordered. Noted to have elevated D dimer, lower extremity US was done, negative for DVT. His ECHO was suggestive of diastolic CHF. His symptoms improved and Lasix was changed to PO. Antihypertensives were adjusted. PT evaluation was advised [facility], but patient insisted on going home.  He declined home care. Per PMD his baseline creatinine is at 1.8. His course was otherwise uneventful and he is stable for discharge home.    Spent > 35 mins in discharge plan and documentation. 89 yr old male with hypertension and CHF presents with worsening shortness of breath for 2 weeks, worse over last 2 days, with leg swelling and orthopnea. Noted to have pleural effusions on CXR. He was started on IV Lasix. Patient was not compliant for Lasix for over 4 weeks. Cardiology consulted, he was started on Coreg and Imdur. ECHO ordered. Noted to have elevated D dimer, lower extremity US was done, negative for DVT. His ECHO was suggestive of diastolic CHF. His symptoms improved and Lasix was changed to PO. Antihypertensives were adjusted. PT evaluation was advised [facility], but patient insisted on going home.  Home care arranged. Per PMD his baseline creatinine is at 1.8. His course was otherwise uneventful and he is stable for discharge home.    Spent > 35 mins in discharge plan and documentation.

## 2017-10-05 NOTE — DISCHARGE NOTE ADULT - PATIENT PORTAL LINK FT
“You can access the [facility] Patient Portal, offered by [facility], by registering with the following website: [email or web address]”

## 2017-10-05 NOTE — PHYSICAL THERAPY INITIAL EVALUATION ADULT - GENERAL OBSERVATIONS, REHAB EVAL
pt received seated on EOB with SUGAR Banegas, + O2nc + telemetry, c/o bilateral Hip pain "from being in the bed", pt agreeable to PT

## 2017-10-05 NOTE — DISCHARGE NOTE ADULT - ADDITIONAL INSTRUCTIONS
Continue medications as instructed, follow up with PMD in 1 week. Monitor weight daily. Return to ED for worsening symptoms.

## 2017-10-05 NOTE — PHYSICAL THERAPY INITIAL EVALUATION ADULT - CRITERIA FOR SKILLED THERAPEUTIC INTERVENTIONS
[facility] vs Home with 24 hour assist/supervision, RW and Home PT/impairments found/anticipated discharge recommendation/rehab potential

## 2017-10-05 NOTE — PHYSICAL THERAPY INITIAL EVALUATION ADULT - IMPAIRMENTS FOUND, PT EVAL
posture/gait, locomotion, and balance/ventilation and respiration/gas exchange/aerobic capacity/endurance/anthropometric characteristics/muscle strength

## 2017-10-05 NOTE — PROGRESS NOTE ADULT - SUBJECTIVE AND OBJECTIVE BOX
INTERVAL HPI/OVERNIGHT EVENTS:    CC: diastolic CHF, hypertension, CKD, hypothyroid    No overnight events, feels better. Denies complaints.     Vital Signs Last 24 Hrs  T(C): 36.9 (05 Oct 2017 13:28), Max: 37 (04 Oct 2017 17:14)  T(F): 98.4 (05 Oct 2017 13:28), Max: 98.6 (04 Oct 2017 17:14)  HR: 78 (05 Oct 2017 13:28) (70 - 82)  BP: 94/40 (05 Oct 2017 13:28) (94/40 - 175/72)  BP(mean): --  RR: 16 (05 Oct 2017 13:28) (16 - 18)  SpO2: 92% (05 Oct 2017 13:28) (92% - 98%)    PHYSICAL EXAM:    GENERAL: Not in distress, alert  CHEST/LUNG: b/l air entry, decreased in bases, improved.  HEART: Regular   ABDOMEN: Soft, BS+  EXTREMITIES:  2+ edema, improved, dry skin changes.    MEDICATIONS  (STANDING):  aspirin buffered 325 milliGRAM(s) Oral daily  carvedilol 6.25 milliGRAM(s) Oral every 12 hours  furosemide    Tablet 40 milliGRAM(s) Oral daily  heparin  Injectable 5000 Unit(s) SubCutaneous every 12 hours  hydrALAZINE 25 milliGRAM(s) Oral every 8 hours  influenza   Vaccine 0.5 milliLiter(s) IntraMuscular once  influenza  Vaccine (HIGH DOSE) 0.5 milliLiter(s) IntraMuscular once  isosorbide   mononitrate ER Tablet (IMDUR) 30 milliGRAM(s) Oral daily  levothyroxine 50 MICROGram(s) Oral daily  potassium chloride    Tablet ER 20 milliEquivalent(s) Oral daily  simvastatin 20 milliGRAM(s) Oral at bedtime    MEDICATIONS  (PRN):  acetaminophen   Tablet 650 milliGRAM(s) Oral every 6 hours PRN For Temp greater than 38 C (100.4 F)  acetaminophen   Tablet. 650 milliGRAM(s) Oral every 6 hours PRN Moderate Pain (4 - 6)      Allergies    No Known Allergies    Intolerances          LABS:                          12.4   7.6   )-----------( 252      ( 04 Oct 2017 05:36 )             38.8     10-05    145  |  104  |  40.0<H>  ----------------------------<  107  4.4   |  26.0  |  1.90<H>    Ca    9.4      05 Oct 2017 06:40  Phos  3.4     10-04  Mg     2.1     10-05      PT/INR - ( 04 Oct 2017 05:36 )   PT: 10.8 sec;   INR: 0.98 ratio               RADIOLOGY & ADDITIONAL TESTS:

## 2017-10-05 NOTE — PHYSICAL THERAPY INITIAL EVALUATION ADULT - ADDITIONAL COMMENTS
Pt lives alone in a private home. 3 +1 steps to enter with handrails, no steps inside. Pt was independent PTA with RW but required assist on stairs. Pt's family lives nearby, Pt states they check in on him "2-3 times a week" Pt owns rollator and shower chair.

## 2017-10-05 NOTE — PROGRESS NOTE ADULT - ASSESSMENT
89 yr old male with hypertension and CHF presents with worsening shortness of breath for 2 weeks, worse over last 2 days, with leg swelling and orthopnea. Noted to have pleural effusions on CXR. He was started on IV Lasix. Patient was not compliant for Lasix for over 4 weeks. Cardiology consulted, he was started on Coreg and Imdur. ECHO ordered. Noted to have elevated D dimer, lower extremity US was done, negative for DVT. ECHO showed diastolic CHF. His medications as instructed. Creatinine baseline 1.9 as per PMD office. Refused HUMBERTO.

## 2017-10-05 NOTE — DISCHARGE NOTE ADULT - CARE PLAN
Principal Discharge DX:	CHF (congestive heart failure)  Goal:	Remain free of shortness of breath  Instructions for follow-up, activity and diet:	Continue Lasix, cardiology follow up.  Secondary Diagnosis:	HTN (hypertension)  Instructions for follow-up, activity and diet:	Continue medications as instructed.  Secondary Diagnosis:	Hypothyroid  Instructions for follow-up, activity and diet:	Continue Synthroid.

## 2017-10-05 NOTE — DISCHARGE NOTE ADULT - CARE PROVIDER_API CALL
Hern[facility] Santa), Family Medicine  [address] [place] [address]  Phone: [phone]  Fax: [phone]

## 2017-10-05 NOTE — PROGRESS NOTE ADULT - ASSESSMENT
1. BP elevated, increase hydralazine to 25 mg tid  2. Normal LV function. Diastolic dysfunction.   3. Change lasix to daily  4. No need for ischemic klein in this 89 yo male  5. elevated Cr. PT can fu with pmd as outpt next week  6. ok to dc home today from my standpoint fu in 2 weeks   thank you.

## 2019-07-22 ENCOUNTER — INPATIENT (INPATIENT)
Facility: HOSPITAL | Age: 84
LOS: 8 days | Discharge: HOME | DRG: 871 | End: 2019-07-31
Attending: GENERAL ACUTE CARE HOSPITAL | Admitting: INTERNAL MEDICINE
Payer: MEDICARE

## 2019-07-22 VITALS
TEMPERATURE: 99 F | RESPIRATION RATE: 18 BRPM | SYSTOLIC BLOOD PRESSURE: 91 MMHG | OXYGEN SATURATION: 93 % | DIASTOLIC BLOOD PRESSURE: 56 MMHG | HEIGHT: 66 IN | WEIGHT: 229.94 LBS | HEART RATE: 83 BPM

## 2019-07-22 DIAGNOSIS — Z98.890 OTHER SPECIFIED POSTPROCEDURAL STATES: Chronic | ICD-10-CM

## 2019-07-22 LAB
ALBUMIN SERPL ELPH-MCNC: 3.8 G/DL — SIGNIFICANT CHANGE UP (ref 3.3–5.2)
ALP SERPL-CCNC: 77 U/L — SIGNIFICANT CHANGE UP (ref 40–120)
ALT FLD-CCNC: 13 U/L — SIGNIFICANT CHANGE UP
ANION GAP SERPL CALC-SCNC: 16 MMOL/L — SIGNIFICANT CHANGE UP (ref 5–17)
AST SERPL-CCNC: 34 U/L — SIGNIFICANT CHANGE UP
BILIRUB SERPL-MCNC: 0.7 MG/DL — SIGNIFICANT CHANGE UP (ref 0.4–2)
BUN SERPL-MCNC: 50 MG/DL — HIGH (ref 8–20)
CALCIUM SERPL-MCNC: 9.2 MG/DL — SIGNIFICANT CHANGE UP (ref 8.6–10.2)
CHLORIDE SERPL-SCNC: 101 MMOL/L — SIGNIFICANT CHANGE UP (ref 98–107)
CO2 SERPL-SCNC: 21 MMOL/L — LOW (ref 22–29)
CREAT SERPL-MCNC: 2.45 MG/DL — HIGH (ref 0.5–1.3)
GLUCOSE SERPL-MCNC: 113 MG/DL — SIGNIFICANT CHANGE UP (ref 70–115)
HCT VFR BLD CALC: 40.7 % — SIGNIFICANT CHANGE UP (ref 39–50)
HGB BLD-MCNC: 13.1 G/DL — SIGNIFICANT CHANGE UP (ref 13–17)
MCHC RBC-ENTMCNC: 31.3 PG — SIGNIFICANT CHANGE UP (ref 27–34)
MCHC RBC-ENTMCNC: 32.2 GM/DL — SIGNIFICANT CHANGE UP (ref 32–36)
MCV RBC AUTO: 97.4 FL — SIGNIFICANT CHANGE UP (ref 80–100)
PLATELET # BLD AUTO: 202 K/UL — SIGNIFICANT CHANGE UP (ref 150–400)
POTASSIUM SERPL-MCNC: 4.8 MMOL/L — SIGNIFICANT CHANGE UP (ref 3.5–5.3)
POTASSIUM SERPL-SCNC: 4.8 MMOL/L — SIGNIFICANT CHANGE UP (ref 3.5–5.3)
PROT SERPL-MCNC: 7.5 G/DL — SIGNIFICANT CHANGE UP (ref 6.6–8.7)
RBC # BLD: 4.18 M/UL — LOW (ref 4.2–5.8)
RBC # FLD: 13.5 % — SIGNIFICANT CHANGE UP (ref 10.3–14.5)
SODIUM SERPL-SCNC: 138 MMOL/L — SIGNIFICANT CHANGE UP (ref 135–145)

## 2019-07-22 PROCEDURE — 71045 X-RAY EXAM CHEST 1 VIEW: CPT | Mod: 26

## 2019-07-22 PROCEDURE — 70486 CT MAXILLOFACIAL W/O DYE: CPT | Mod: 26

## 2019-07-22 PROCEDURE — 99285 EMERGENCY DEPT VISIT HI MDM: CPT

## 2019-07-22 PROCEDURE — 70450 CT HEAD/BRAIN W/O DYE: CPT | Mod: 26

## 2019-07-22 PROCEDURE — 93010 ELECTROCARDIOGRAM REPORT: CPT

## 2019-07-22 NOTE — ED PROVIDER NOTE - OBJECTIVE STATEMENT
The patient is a 89 y.o. M with hx of CHF complaining of fall from standing this morning. The patient fell when attempting to get up from the toilet. He states his legs felt weak however he denies any dizziness or lightheadedness. He was unable to lift himself up and remained down for three hours before crawling to the nearest phone. He denies any incontinence and LOC. He states this has never happened before. The patient typically ambulates with a walker because he has bilateral lower extremity edema secondary CHF. He is completely independent, lives at home alone, adheres to his daily medication regimen, and orders food and drinks from online. He has a visiting RN come to his home but has not seen any other health care providers in "many years" and does not leave his home. He denies any syncope, presyncopal episodes, headache, fevers, sweats, chills, blurry vision, changes in vision, tinnitus, sore throat, chest pain, shortness of breath, abdominal pain, nausea, vomiting, diarrhea, dysuria, hematuria, numbness, or tingling.

## 2019-07-22 NOTE — ED ADULT NURSE NOTE - OBJECTIVE STATEMENT
patient fell at home, stated that his legs became weak and fell on knees. c/o b/l knee pain and b/l feet pain. patient lives at home and takes care of himself.

## 2019-07-22 NOTE — ED PROVIDER NOTE - PHYSICAL EXAMINATION
General: well appearing, NAD  Head:  NC, AT, no bruising, no bleeding/drainage/erythema  Eyes: EOMI, PERRL, no scleral icterus, no racoon eyes  Ears: no erythema/drainage, no battles sign  Neck: left sided paraspinal tenderness, no cervical spinal tenderness, no pain with extension/flexion/rotational movements  Nose: midline, no bleeding/drainage  Throat: uvula midline, no lesions, MMM  Cardiac: RRR, no m/r/g, bilateral lower extremity edema  Respiratory: CTABL, no wheezes/rales, equal chest wall expansion  Abdomen: soft, ND, NT, BSx4, no rebound tenderness, no guarding, nonperitonitic  MSK/Vascular: full ROM, distal pulses intact, soft compartments, warm extremities, BLLE  Neuro: AAOx3, negative pronator drift, strength 5/5, sensation to light touch intact, finger to nose coordination intact, cranial nerves 2-12 intact  Psych: calm, cooperative, normal affect General: well appearing, NAD  Head:  NC, AT, no bruising, no bleeding/drainage/erythema  Eyes: EOMI, PERRL, no scleral icterus, no racoon eyes  Ears: no erythema/drainage, no battles sign  Neck: left sided paraspinal tenderness, no cervical spinal tenderness, no pain with extension/flexion/rotational movements  Nose: midline, no bleeding/drainage  Throat: uvula midline, no lesions, MMM  Cardiac: RRR, no m/r/g, bilateral 4+ lower extremity edema  Respiratory: CTABL, no wheezes/rales, equal chest wall expansion  Abdomen: soft, ND, NT, BSx4, no rebound tenderness, no guarding, nonperitonitic  MSK/Vascular: full ROM, distal pulses intact, soft compartments, warm extremities, BLLE  Neuro: AAOx3, negative pronator drift, strength 5/5, sensation to light touch intact, finger to nose coordination intact, cranial nerves 2-12 intact  Psych: calm, cooperative, normal affect

## 2019-07-22 NOTE — ED PROVIDER NOTE - CLINICAL SUMMARY MEDICAL DECISION MAKING FREE TEXT BOX
Pt is a 89 y.o. M who fell at home while attempting to stand from the toilet, landing on his left face. The patient is neurovascularly intact on physical exam and has no signs of injury. Patient alert, oriented x3 and denies any LOC or incontinence. Labs including CK and troponin ordered, imaging to assess head and face ordered. Will follow up.

## 2019-07-22 NOTE — ED ADULT TRIAGE NOTE - CHIEF COMPLAINT QUOTE
Pt brought in from home for eval s/p fall while in bathroom. Pt tripped and fell while in the bathroom. Pt denies LOC or hitting head. Pt was found on floor after 3-4 hours when pt was able to get to a phone. Pt with +b/l lower ext edema.

## 2019-07-22 NOTE — ED PROVIDER NOTE - PMH
CHF (congestive heart failure)    HTN (hypertension)    Hypothyroidism, unspecified type    Prostate enlargement

## 2019-07-22 NOTE — ED ADULT NURSE NOTE - NSIMPLEMENTINTERV_GEN_ALL_ED
Implemented All Universal Safety Interventions:  [place] to call system. Call bell, personal items and telephone within reach. Instruct patient to call for assistance. Room bathroom lighting operational. Non-slip footwear when patient is off stretcher. Physically safe environment: no spills, clutter or unnecessary equipment. Stretcher in lowest position, wheels locked, appropriate side rails in place.

## 2019-07-22 NOTE — ED PROVIDER NOTE - ATTENDING CONTRIBUTION TO CARE
I personally saw the patient with the resident, and completed the key components of the history and physical exam. I then discussed the management plan with the resident.  89 year old male with PMH chronic lymphedema, CHF, HTN presents following fall. He states that he was on toilet, tried to get up and his legs felt extremely heavy due to his lymphedema, and they gave out on him, and he fell to floor. No LOC, did not have chest pain, SOB, palpitations. He wa sunable to get himself up for several hours  Gen: NAD, well appearing  CV: RRR, 4+ pitting edema b/l  Pul: CTA b/l  Abd: Soft, non-distended, non-tender  Neuro: no focal deficits  Pt with tristen, rhabdo, will admit

## 2019-07-22 NOTE — ED PROVIDER NOTE - CARE PLAN
Principal Discharge DX:	MATILDE (acute kidney injury)  Secondary Diagnosis:	Non-traumatic rhabdomyolysis  Secondary Diagnosis:	Fall, initial encounter

## 2019-07-23 DIAGNOSIS — N17.9 ACUTE KIDNEY FAILURE, UNSPECIFIED: ICD-10-CM

## 2019-07-23 PROBLEM — I10 ESSENTIAL (PRIMARY) HYPERTENSION: Chronic | Status: ACTIVE | Noted: 2017-10-03

## 2019-07-23 PROBLEM — I50.9 HEART FAILURE, UNSPECIFIED: Chronic | Status: ACTIVE | Noted: 2017-10-03

## 2019-07-23 LAB
ANION GAP SERPL CALC-SCNC: 16 MMOL/L — SIGNIFICANT CHANGE UP (ref 5–17)
ANISOCYTOSIS BLD QL: SLIGHT — SIGNIFICANT CHANGE UP
ANISOCYTOSIS BLD QL: SLIGHT — SIGNIFICANT CHANGE UP
APPEARANCE UR: CLEAR — SIGNIFICANT CHANGE UP
BACTERIA # UR AUTO: ABNORMAL
BASE EXCESS BLDA CALC-SCNC: -2.3 MMOL/L — LOW (ref -2–2)
BASOPHILS # BLD AUTO: 0 K/UL — SIGNIFICANT CHANGE UP (ref 0–0.2)
BASOPHILS # BLD AUTO: 0 K/UL — SIGNIFICANT CHANGE UP (ref 0–0.2)
BASOPHILS # BLD AUTO: 0.04 K/UL — SIGNIFICANT CHANGE UP (ref 0–0.2)
BASOPHILS NFR BLD AUTO: 0 % — SIGNIFICANT CHANGE UP (ref 0–2)
BASOPHILS NFR BLD AUTO: 0 % — SIGNIFICANT CHANGE UP (ref 0–2)
BASOPHILS NFR BLD AUTO: 0.3 % — SIGNIFICANT CHANGE UP (ref 0–2)
BILIRUB UR-MCNC: NEGATIVE — SIGNIFICANT CHANGE UP
BLOOD GAS COMMENTS ARTERIAL: SIGNIFICANT CHANGE UP
BUN SERPL-MCNC: 50 MG/DL — HIGH (ref 8–20)
BURR CELLS BLD QL SMEAR: PRESENT — SIGNIFICANT CHANGE UP
CALCIUM SERPL-MCNC: 9.2 MG/DL — SIGNIFICANT CHANGE UP (ref 8.6–10.2)
CHLORIDE SERPL-SCNC: 102 MMOL/L — SIGNIFICANT CHANGE UP (ref 98–107)
CK MB CFR SERPL CALC: 6.5 NG/ML — SIGNIFICANT CHANGE UP (ref 0–6.7)
CK SERPL-CCNC: 1368 U/L — HIGH (ref 30–200)
CO2 SERPL-SCNC: 21 MMOL/L — LOW (ref 22–29)
COLOR SPEC: YELLOW — SIGNIFICANT CHANGE UP
CREAT SERPL-MCNC: 2.35 MG/DL — HIGH (ref 0.5–1.3)
DACRYOCYTES BLD QL SMEAR: SIGNIFICANT CHANGE UP
DIFF PNL FLD: ABNORMAL
EOSINOPHIL # BLD AUTO: 0 K/UL — SIGNIFICANT CHANGE UP (ref 0–0.5)
EOSINOPHIL # BLD AUTO: 0.32 K/UL — SIGNIFICANT CHANGE UP (ref 0–0.5)
EOSINOPHIL # BLD AUTO: 0.4 K/UL — SIGNIFICANT CHANGE UP (ref 0–0.5)
EOSINOPHIL NFR BLD AUTO: 0 % — SIGNIFICANT CHANGE UP (ref 0–6)
EOSINOPHIL NFR BLD AUTO: 2.4 % — SIGNIFICANT CHANGE UP (ref 0–6)
EOSINOPHIL NFR BLD AUTO: 3 % — SIGNIFICANT CHANGE UP (ref 0–6)
EPI CELLS # UR: SIGNIFICANT CHANGE UP
GAS PNL BLDA: SIGNIFICANT CHANGE UP
GLUCOSE SERPL-MCNC: 108 MG/DL — SIGNIFICANT CHANGE UP (ref 70–115)
GLUCOSE UR QL: NEGATIVE MG/DL — SIGNIFICANT CHANGE UP
GRAN CASTS # UR COMP ASSIST: ABNORMAL /LPF
HCO3 BLDA-SCNC: 22 MMOL/L — SIGNIFICANT CHANGE UP (ref 20–26)
HCT VFR BLD CALC: 37.1 % — LOW (ref 39–50)
HCT VFR BLD CALC: 38.8 % — LOW (ref 39–50)
HGB BLD-MCNC: 11.8 G/DL — LOW (ref 13–17)
HGB BLD-MCNC: 12 G/DL — LOW (ref 13–17)
HOROWITZ INDEX BLDA+IHG-RTO: SIGNIFICANT CHANGE UP
IMM GRANULOCYTES NFR BLD AUTO: 0.8 % — SIGNIFICANT CHANGE UP (ref 0–1.5)
KETONES UR-MCNC: ABNORMAL
LACTATE SERPL-SCNC: 2.2 MMOL/L — HIGH (ref 0.5–2)
LEUKOCYTE ESTERASE UR-ACNC: ABNORMAL
LYMPHOCYTES # BLD AUTO: 0.59 K/UL — LOW (ref 1–3.3)
LYMPHOCYTES # BLD AUTO: 1.05 K/UL — SIGNIFICANT CHANGE UP (ref 1–3.3)
LYMPHOCYTES # BLD AUTO: 1.34 K/UL — SIGNIFICANT CHANGE UP (ref 1–3.3)
LYMPHOCYTES # BLD AUTO: 10 % — LOW (ref 13–44)
LYMPHOCYTES # BLD AUTO: 4.3 % — LOW (ref 13–44)
LYMPHOCYTES # BLD AUTO: 6.1 % — LOW (ref 13–44)
MACROCYTES BLD QL: SLIGHT — SIGNIFICANT CHANGE UP
MACROCYTES BLD QL: SLIGHT — SIGNIFICANT CHANGE UP
MANUAL SMEAR VERIFICATION: SIGNIFICANT CHANGE UP
MCHC RBC-ENTMCNC: 30.7 PG — SIGNIFICANT CHANGE UP (ref 27–34)
MCHC RBC-ENTMCNC: 30.8 PG — SIGNIFICANT CHANGE UP (ref 27–34)
MCHC RBC-ENTMCNC: 30.9 GM/DL — LOW (ref 32–36)
MCHC RBC-ENTMCNC: 31.8 GM/DL — LOW (ref 32–36)
MCV RBC AUTO: 96.6 FL — SIGNIFICANT CHANGE UP (ref 80–100)
MCV RBC AUTO: 99.7 FL — SIGNIFICANT CHANGE UP (ref 80–100)
METAMYELOCYTES # FLD: 1 % — HIGH (ref 0–0)
MONOCYTES # BLD AUTO: 0.94 K/UL — HIGH (ref 0–0.9)
MONOCYTES # BLD AUTO: 1.67 K/UL — HIGH (ref 0–0.9)
MONOCYTES # BLD AUTO: 1.94 K/UL — HIGH (ref 0–0.9)
MONOCYTES NFR BLD AUTO: 11.3 % — SIGNIFICANT CHANGE UP (ref 2–14)
MONOCYTES NFR BLD AUTO: 12.3 % — SIGNIFICANT CHANGE UP (ref 2–14)
MONOCYTES NFR BLD AUTO: 7 % — SIGNIFICANT CHANGE UP (ref 2–14)
NEUTROPHILS # BLD AUTO: 10.59 K/UL — HIGH (ref 1.8–7.4)
NEUTROPHILS # BLD AUTO: 10.88 K/UL — HIGH (ref 1.8–7.4)
NEUTROPHILS # BLD AUTO: 14.22 K/UL — HIGH (ref 1.8–7.4)
NEUTROPHILS NFR BLD AUTO: 75 % — SIGNIFICANT CHANGE UP (ref 43–77)
NEUTROPHILS NFR BLD AUTO: 79.9 % — HIGH (ref 43–77)
NEUTROPHILS NFR BLD AUTO: 81.7 % — HIGH (ref 43–77)
NEUTS BAND # BLD: 0.9 % — SIGNIFICANT CHANGE UP (ref 0–8)
NEUTS BAND # BLD: 4 % — SIGNIFICANT CHANGE UP (ref 0–8)
NITRITE UR-MCNC: NEGATIVE — SIGNIFICANT CHANGE UP
NRBC # BLD: 0 /100 — SIGNIFICANT CHANGE UP (ref 0–0)
NT-PROBNP SERPL-SCNC: 4177 PG/ML — HIGH (ref 0–300)
OVALOCYTES BLD QL SMEAR: SLIGHT — SIGNIFICANT CHANGE UP
OVALOCYTES BLD QL SMEAR: SLIGHT — SIGNIFICANT CHANGE UP
PCO2 BLDA: 36 MMHG — SIGNIFICANT CHANGE UP (ref 35–45)
PH BLDA: 7.4 — SIGNIFICANT CHANGE UP (ref 7.35–7.45)
PH UR: 5 — SIGNIFICANT CHANGE UP (ref 5–8)
PLAT MORPH BLD: NORMAL — SIGNIFICANT CHANGE UP
PLAT MORPH BLD: NORMAL — SIGNIFICANT CHANGE UP
PLATELET # BLD AUTO: 199 K/UL — SIGNIFICANT CHANGE UP (ref 150–400)
PLATELET # BLD AUTO: 214 K/UL — SIGNIFICANT CHANGE UP (ref 150–400)
PO2 BLDA: 76 MMHG — LOW (ref 83–108)
POIKILOCYTOSIS BLD QL AUTO: SIGNIFICANT CHANGE UP
POIKILOCYTOSIS BLD QL AUTO: SLIGHT — SIGNIFICANT CHANGE UP
POTASSIUM SERPL-MCNC: 4.3 MMOL/L — SIGNIFICANT CHANGE UP (ref 3.5–5.3)
POTASSIUM SERPL-SCNC: 4.3 MMOL/L — SIGNIFICANT CHANGE UP (ref 3.5–5.3)
PROCALCITONIN SERPL-MCNC: 4.64 NG/ML — HIGH (ref 0.02–0.1)
PROT UR-MCNC: 30 MG/DL
RAPID RVP RESULT: SIGNIFICANT CHANGE UP
RBC # BLD: 3.84 M/UL — LOW (ref 4.2–5.8)
RBC # BLD: 3.89 M/UL — LOW (ref 4.2–5.8)
RBC # FLD: 13.5 % — SIGNIFICANT CHANGE UP (ref 10.3–14.5)
RBC # FLD: 13.8 % — SIGNIFICANT CHANGE UP (ref 10.3–14.5)
RBC BLD AUTO: ABNORMAL
RBC BLD AUTO: ABNORMAL
RBC CASTS # UR COMP ASSIST: ABNORMAL /HPF (ref 0–4)
SAO2 % BLDA: 96 % — SIGNIFICANT CHANGE UP (ref 95–99)
SODIUM SERPL-SCNC: 139 MMOL/L — SIGNIFICANT CHANGE UP (ref 135–145)
SP GR SPEC: 1.01 — SIGNIFICANT CHANGE UP (ref 1.01–1.02)
TROPONIN T SERPL-MCNC: 0.02 NG/ML — SIGNIFICANT CHANGE UP (ref 0–0.06)
TROPONIN T SERPL-MCNC: 0.02 NG/ML — SIGNIFICANT CHANGE UP (ref 0–0.06)
UROBILINOGEN FLD QL: NEGATIVE MG/DL — SIGNIFICANT CHANGE UP
WBC # BLD: 13.41 K/UL — HIGH (ref 3.8–10.5)
WBC # BLD: 13.61 K/UL — HIGH (ref 3.8–10.5)
WBC # BLD: 17.21 K/UL — HIGH (ref 3.8–10.5)
WBC # FLD AUTO: 13.41 K/UL — HIGH (ref 3.8–10.5)
WBC # FLD AUTO: 13.61 K/UL — HIGH (ref 3.8–10.5)
WBC # FLD AUTO: 17.21 K/UL — HIGH (ref 3.8–10.5)
WBC UR QL: SIGNIFICANT CHANGE UP

## 2019-07-23 PROCEDURE — 99223 1ST HOSP IP/OBS HIGH 75: CPT

## 2019-07-23 PROCEDURE — 71250 CT THORAX DX C-: CPT | Mod: 26

## 2019-07-23 PROCEDURE — 12345: CPT | Mod: NC

## 2019-07-23 PROCEDURE — 99497 ADVNCD CARE PLAN 30 MIN: CPT | Mod: 25

## 2019-07-23 RX ORDER — SODIUM CHLORIDE 9 MG/ML
1000 INJECTION INTRAMUSCULAR; INTRAVENOUS; SUBCUTANEOUS
Refills: 0 | Status: DISCONTINUED | OUTPATIENT
Start: 2019-07-23 | End: 2019-07-23

## 2019-07-23 RX ORDER — SACCHAROMYCES BOULARDII 250 MG
250 POWDER IN PACKET (EA) ORAL
Refills: 0 | Status: DISCONTINUED | OUTPATIENT
Start: 2019-07-23 | End: 2019-07-31

## 2019-07-23 RX ORDER — ALBUTEROL 90 UG/1
2.5 AEROSOL, METERED ORAL EVERY 4 HOURS
Refills: 0 | Status: DISCONTINUED | OUTPATIENT
Start: 2019-07-23 | End: 2019-07-26

## 2019-07-23 RX ORDER — FUROSEMIDE 40 MG
40 TABLET ORAL ONCE
Refills: 0 | Status: COMPLETED | OUTPATIENT
Start: 2019-07-23 | End: 2019-07-23

## 2019-07-23 RX ORDER — ISOSORBIDE DINITRATE 5 MG/1
30 TABLET ORAL THREE TIMES A DAY
Refills: 0 | Status: DISCONTINUED | OUTPATIENT
Start: 2019-07-23 | End: 2019-07-23

## 2019-07-23 RX ORDER — HEPARIN SODIUM 5000 [USP'U]/ML
5000 INJECTION INTRAVENOUS; SUBCUTANEOUS EVERY 8 HOURS
Refills: 0 | Status: DISCONTINUED | OUTPATIENT
Start: 2019-07-23 | End: 2019-07-27

## 2019-07-23 RX ORDER — ASPIRIN/CALCIUM CARB/MAGNESIUM 324 MG
325 TABLET ORAL DAILY
Refills: 0 | Status: DISCONTINUED | OUTPATIENT
Start: 2019-07-23 | End: 2019-07-31

## 2019-07-23 RX ORDER — AZITHROMYCIN 500 MG/1
TABLET, FILM COATED ORAL
Refills: 0 | Status: DISCONTINUED | OUTPATIENT
Start: 2019-07-23 | End: 2019-07-25

## 2019-07-23 RX ORDER — CARVEDILOL PHOSPHATE 80 MG/1
12.5 CAPSULE, EXTENDED RELEASE ORAL EVERY 12 HOURS
Refills: 0 | Status: DISCONTINUED | OUTPATIENT
Start: 2019-07-23 | End: 2019-07-31

## 2019-07-23 RX ORDER — AZITHROMYCIN 500 MG/1
500 TABLET, FILM COATED ORAL ONCE
Refills: 0 | Status: COMPLETED | OUTPATIENT
Start: 2019-07-23 | End: 2019-07-23

## 2019-07-23 RX ORDER — FUROSEMIDE 40 MG
40 TABLET ORAL DAILY
Refills: 0 | Status: DISCONTINUED | OUTPATIENT
Start: 2019-07-23 | End: 2019-07-23

## 2019-07-23 RX ORDER — AZITHROMYCIN 500 MG/1
500 TABLET, FILM COATED ORAL EVERY 24 HOURS
Refills: 0 | Status: DISCONTINUED | OUTPATIENT
Start: 2019-07-24 | End: 2019-07-25

## 2019-07-23 RX ORDER — LEVOTHYROXINE SODIUM 125 MCG
50 TABLET ORAL DAILY
Refills: 0 | Status: DISCONTINUED | OUTPATIENT
Start: 2019-07-23 | End: 2019-07-31

## 2019-07-23 RX ORDER — IPRATROPIUM/ALBUTEROL SULFATE 18-103MCG
3 AEROSOL WITH ADAPTER (GRAM) INHALATION EVERY 6 HOURS
Refills: 0 | Status: DISCONTINUED | OUTPATIENT
Start: 2019-07-23 | End: 2019-07-27

## 2019-07-23 RX ORDER — CEFTRIAXONE 500 MG/1
1000 INJECTION, POWDER, FOR SOLUTION INTRAMUSCULAR; INTRAVENOUS EVERY 24 HOURS
Refills: 0 | Status: DISCONTINUED | OUTPATIENT
Start: 2019-07-23 | End: 2019-07-26

## 2019-07-23 RX ORDER — IPRATROPIUM/ALBUTEROL SULFATE 18-103MCG
3 AEROSOL WITH ADAPTER (GRAM) INHALATION EVERY 6 HOURS
Refills: 0 | Status: DISCONTINUED | OUTPATIENT
Start: 2019-07-23 | End: 2019-07-23

## 2019-07-23 RX ORDER — ISOSORBIDE DINITRATE 5 MG/1
30 TABLET ORAL DAILY
Refills: 0 | Status: DISCONTINUED | OUTPATIENT
Start: 2019-07-23 | End: 2019-07-24

## 2019-07-23 RX ADMIN — HEPARIN SODIUM 5000 UNIT(S): 5000 INJECTION INTRAVENOUS; SUBCUTANEOUS at 06:40

## 2019-07-23 RX ADMIN — CEFTRIAXONE 100 MILLIGRAM(S): 500 INJECTION, POWDER, FOR SOLUTION INTRAMUSCULAR; INTRAVENOUS at 06:32

## 2019-07-23 RX ADMIN — Medication 50 MICROGRAM(S): at 06:33

## 2019-07-23 RX ADMIN — Medication 40 MILLIGRAM(S): at 06:33

## 2019-07-23 RX ADMIN — Medication 3 MILLILITER(S): at 03:51

## 2019-07-23 RX ADMIN — Medication 3 MILLILITER(S): at 15:10

## 2019-07-23 RX ADMIN — Medication 325 MILLIGRAM(S): at 09:16

## 2019-07-23 RX ADMIN — Medication 3 MILLILITER(S): at 20:51

## 2019-07-23 RX ADMIN — AZITHROMYCIN 255 MILLIGRAM(S): 500 TABLET, FILM COATED ORAL at 09:13

## 2019-07-23 RX ADMIN — HEPARIN SODIUM 5000 UNIT(S): 5000 INJECTION INTRAVENOUS; SUBCUTANEOUS at 13:01

## 2019-07-23 RX ADMIN — HEPARIN SODIUM 5000 UNIT(S): 5000 INJECTION INTRAVENOUS; SUBCUTANEOUS at 22:45

## 2019-07-23 RX ADMIN — CARVEDILOL PHOSPHATE 12.5 MILLIGRAM(S): 80 CAPSULE, EXTENDED RELEASE ORAL at 06:33

## 2019-07-23 RX ADMIN — Medication 40 MILLIGRAM(S): at 03:44

## 2019-07-23 NOTE — PHYSICAL THERAPY INITIAL EVALUATION ADULT - PERTINENT HX OF CURRENT PROBLEM, REHAB EVAL
Pt previously ambulating modified independent at home with a RW, presents to [facility] s/p fall in the home in which he reports that his legs gave out

## 2019-07-23 NOTE — CHART NOTE - NSCHARTNOTEFT_GEN_A_CORE
Patient seen and examined in day time. Agree with H+P, A/P with the exception or elaboration of the following:    #acute resp failure w/ hypoxia, likely multifactorial sec to CAP w/ sepsis + acute on chronic hfref. stable  sepsis resolved  hf exac likely sec to acute infection, pt adherent w/ meds + diet + lifestyle  on bipap, titrate to venti/NC as tolerated  iv lasix, titrate to oral as tolerated  strict i/o, daily wt  rvp neg, check urine legionella, if neg stop zithro started 7/23  rocephin started 7/23, 5-7d, florastor  trend procal  cxr w/ bibasilar effusion, cannot exclude opacities there, ct chest pending  blood cx pending  echo pending  trop neg x2, fu repeat @ 1300, tele until acs ruled out + echo resulted  ss cardio eval appreciated    #htn. controlled    #tristen sec to atn from rhadomyolysis. worsening, asymptomatic  tristen stable  baseline cr 1.7  ua w/o uti, casts + hematuria from atn  holding aggressive ivf sec to hf exac  serial cpk + bmp  nephro eval if worsening tristen  outpt repeat ua after resolution, if hematuria still present urology eval    #gait instability complicated by fall.   PT eval appreciated, home PT

## 2019-07-23 NOTE — H&P ADULT - NSICDXPASTMEDICALHX_GEN_ALL_CORE_FT
PAST MEDICAL HISTORY:  CHF (congestive heart failure)     HTN (hypertension)     Hypothyroidism, unspecified type     Prostate enlargement

## 2019-07-23 NOTE — H&P ADULT - NSHPLABSRESULTS_GEN_ALL_CORE
13.1   17.21 )-----------( 202      ( 22 Jul 2019 23:19 )             40.7       07-22    138  |  101  |  50.0<H>  ----------------------------<  113  4.8   |  21.0<L>  |  2.45<H>    Ca    9.2      22 Jul 2019 23:18    TPro  7.5  /  Alb  3.8  /  TBili  0.7  /  DBili  x   /  AST  34  /  ALT  13  /  AlkPhos  77  07-22      EKG: NSR, HR 81

## 2019-07-23 NOTE — CONSULT NOTE ADULT - SUBJECTIVE AND OBJECTIVE BOX
[place] [facility]                                                        Office: [address] [address]                                                       Telephone: [phone]. Fax:[phone]                                                              CARDIOLOGY CONSULTATION NOTE                                                                                             Consult requested by:  Dr. Azevedo    Reason for Consultation: CHF    History obtained by: Patient and medical record     obtained: No    Chief complaint:    Patient is a 89y old  Male who presents with a chief complaint of fall, rhabdo, MATILDE (2019 01:20)      HPI: 89 y/o male PMH HFpEF, hypothyroid, HTN, chronic lymphedema presents to ED s/p fall at home. Son at bedside, Pt karla x4, lives independently at home, has visiting RN and PA, monthly. Ambulates with walker, states yesterday went to bathroom, went to stand up, and "legs gave out on me". denies lightheaded, dizziness, loss of consciousness, trauma, chest pain, palpitations, shortness of breath, nausea, vomiting, diarrhea. Pt laid on floor for several hours prior to being able to crawl to phone to call EMS. In ED decompensated overnight, sudden onset difficulty breathing, wheezes, not alleviated with nebulizer; required NIPPV to maintain oxygen saturation, IV lasix administered.       ALLERGIES: Allergies  No Known Allergies  Intolerances        CURRENT MEDICATIONS:  MEDICATIONS  (STANDING):  aspirin enteric coated 325 milliGRAM(s) Oral daily  azithromycin  IVPB 500 milliGRAM(s) IV Intermittent once  azithromycin  IVPB      carvedilol 12.5 milliGRAM(s) Oral every 12 hours  cefTRIAXone IVPB 1000 milliGRAM(s) IV Intermittent every 24 hours  furosemide Injectable 40 milliGRAM(s) IV Push daily  heparin Injectable 5000 Unit(s) SubCutaneous every 8 hours  isosorbide dinitrate Tablet (ISORDIL) 30 milliGRAM(s) Oral daily  levothyroxine 50 MICROGram(s) Oral daily      HOME MEDICATIONS:  Home Medications:  carvedilol 12.5 mg oral tablet: 1 tab(s) orally 2 times a day (2019 02:47)  isosorbide dinitrate 30 mg oral tablet: orally once a day (2019 02:47)    PAST MEDICAL HISTORY  Prostate enlargement  Hypothyroidism, unspecified type  HTN (hypertension)  CHF (congestive heart failure)      PAST SURGICAL HISTORY  History of prostate surgery  No significant past surgical history      FAMILY HISTORY:  No pertinent family history in first degree relatives      SOCIAL HISTORY:     CIGARETTES:  Denies  ALCOHOL: 2 drinks / day  DRUG ABUSE: denies       REVIEW OF SYSTEMS:  CONSTITUTIONAL:  as per HPI  HEENT:  Eyes:  No diplopia or blurred vision. ENT:  No earache, sore throat or runny nose.  CARDIOVASCULAR:  No pressure, squeezing, strangling, tightness, heaviness or aching about the chest, neck, axilla or epigastrium.  RESPIRATORY: +DRY COUGH, +ORTHOPNEA +PND  GASTROINTESTINAL:  No nausea, vomiting or diarrhea.  GENITOURINARY:  No dysuria, frequency or urgency. No Blood in urine  MUSCULOSKELETAL: + WEAKNESS no joint aches, no muscle pain  SKIN:  No change in skin, hair or nails.  NEUROLOGIC:  No paresthesias, fasciculations, seizures or weakness.  PSYCHIATRIC:  No disorder of thought or mood.  ENDOCRINE:  No heat or cold intolerance, polyuria or polydipsia.  HEMATOLOGICAL:  No easy bruising or bleeding.         Vital Signs Last 24 Hrs  T(C): 38.3 (19 @ 04:47), Max: 38.3 (19 @ 04:47)  T(F): 101 (-19 @ 04:47), Max: 101 (-23-19 @ 04:47)  HR: 77 (--19 @ 04:47) (77 - 83)  BP: 131/49 (-19 @ 04:47) (91/56 - 131/49)  RR: 18 (-19 @ 04:47) (18 - 18)  SpO2: 95% (-19 @ 04:47) (93% - 95%)      PHYSICAL EXAM:  Constitutional: No acute distress. Appears younger then stated age. NIPPV.  HEENT: Atraumatic and normocephalic , neck is supple . + JVD. Unremarkable  CNS: Alert and awake, Grossly nonfocal.  Lymph Nodes: Cervical : Not palpable.  Respiratory: Bilateral clear breath sounds.  Cardiovascular: Normal S1S2. 2/6 NEYDA RSB, rubs or gallop.  Gastrointestinal: Soft non-tender and non distended . +Bowel sounds.   Extremities: Chronic lymphedema B/L LE  Psychiatric: Calm. no agitation.      Intake and output:    @ 07:01  -   @ 07:00  --------------------------------------------------------  IN: 0 mL / OUT: 120 mL / NET: -120 mL      LABS:                        11.8   13.41 )-----------( 214      ( 2019 07:29 )             37.1     07-22    138  |  101  |  50.0<H>  ----------------------------<  113  4.8   |  21.0<L>  |  2.45<H>    Ca    9.2      2019 23:18    TPro  7.5  /  Alb  3.8  /  TBili  0.7  /  DBili  x   /  AST  34  /  ALT  13  /  AlkPhos  77  07-22    CARDIAC MARKERS ( 2019 23:18 )  x     / <0.01 ng/mL / 1001 U/L / x     / 5.3 ng/mL      Urinalysis Basic - ( 2019 01:07 )    Color: Yellow / Appearance: Clear / S.015 / pH: x  Gluc: x / Ketone: Trace  / Bili: Negative / Urobili: Negative mg/dL   Blood: x / Protein: 30 mg/dL / Nitrite: Negative   Leuk Esterase: Trace / RBC: 3-5 /HPF / WBC 3-5   Sq Epi: x / Non Sq Epi: Occasional / Bacteria: Few      LIVER FUNCTIONS - ( 2019 23:18 )  Alb: 3.8 g/dL / Pro: 7.5 g/dL / ALK PHOS: 77 U/L / ALT: 13 U/L / AST: 34 U/L / GGT: x           INTERPRETATION OF TELEMETRY: Reviewed by me.   ECG: SR- NSST-T abnl    RADIOLOGY & ADDITIONAL STUDIES/ECHO/CARDIAC CATH:   < from: TTE Echo Complete w/Doppler (10.04.17 @ 12:48) >   Summary:   1. Technically difficult study.   2. There is mild concentric left ventricular hypertrophy.   3. Grossly normal LV systolic function on limited views.   4. Spectral Doppler shows impaired relaxation pattern of left   ventricular myocardial filling (Grade I diastolic dysfunction).   5. The left atrium is normal in size.   6. Thickening and calcification of the anterior and posterior mitral   valve leaflets.   7. Mild to moderate aortic valve stenosis.   8. Mild mitral valve regurgitation.   9. Peak transmitral mean gradient equals 4.8 mmHg, calculated mitral   valve area by pressure half time equals 1.73 cm² consistent with mild   mitral stenosis.  10. Moderate mitral annular calcification.  11. There is no evidence of pericardial effusion.     MD Danette Electronically signed on 10/4/2017 at 3:09:15 PM       < end of copied text >

## 2019-07-23 NOTE — H&P ADULT - NSHPPHYSICALEXAM_GEN_ALL_CORE
Vital Signs Last 24 Hrs  T(C): 36.6 (23 Jul 2019 00:25), Max: 37.4 (22 Jul 2019 16:34)  T(F): 97.9 (23 Jul 2019 00:25), Max: 99.3 (22 Jul 2019 16:34)  HR: 78 (23 Jul 2019 00:25) (78 - 83)  BP: 125/73 (23 Jul 2019 00:25) (91/56 - 125/73)  BP(mean): --  RR: 18 (23 Jul 2019 00:25) (18 - 18)  SpO2: 95% (23 Jul 2019 00:25) (93% - 95%)    GENERAL:  Well-appearing elderly male, not in acute distress, appears younger than stated age  EYES:  Clear conjuctiva, extraocular movement intact  ENT: Moist mucous membranes  RESP:  Non-labored breathing pattern, lungs clear to ausculation in anterior fields  CV: Regular rate and rhythm, no murmurs appreciated, no lower extremity edema  GI: Soft, non-tender, non-distended  NEURO: Awake, alert, conversant, upper and lower extremity strength 5/5, light touch sensation grossly intact  PSYCH: Calm, cooperative  SKIN: No rash or lesions, warm and dry

## 2019-07-23 NOTE — H&P ADULT - ASSESSMENT
89yoM hx HTN, HFpEF, hypothyroidism, chronic lymphedema, presenting with mechanical fall at home, found to have MATILDE, rhabo and elevated WBC on admission    #Fall- Pt denies any prodromal symptoms or lose of consciousness. CT head and maxillofacial negative for acute pathology or trauma.  EKG WNL.  Admit to any medical bed.  Fall precautions.  PT eval.     #Rhabdomyolysis- in setting of fall. Admission CK 1000. Gentle IVF ordered given hx of CHF and chronic lymphedema, trend CK.     #MATILDE- Suspect pre-renal and related to rhabo, will give gentle hydration of 75cc x 14 hour.  Trend Cr.     #Leukocytosis- WBC 17, suspect reactive from recent fall. RVP ordered as pt endorsing a dry cough, afebrile and no consolidation seen on my read of CXR. Trend CBC. Procalcitonin ordered for AM.     #HFpEF-  Has chronic lymphedema that pt reports unchanged. CXR on my read shows small pleural effusions, but no hypoxia or respiratory distress, so cautiously started on IVF for MATILDE, rhabdo as above.  Holding PO lasix for now, monitor closely for volume overload.  monitoring ordered. Carvedilol resumed.     #HTN- Carvedilol and isosorbide dinitrate resumed.     #Hypothyroidism- Synthroid resumed.     #Prophylactic measure- heparin.     #Advanced care planning- pt is DNR/DNI. MOLST form filled out, left with arvizu clerk to be scanned into alpha. 89yoM hx HTN, HFpEF, hypothyroidism, chronic lymphedema, presenting with mechanical fall at home, found to have MATILDE, rhabo and elevated WBC on admission    #Fall- Pt denies any prodromal symptoms or lose of consciousness. CT head and maxillofacial negative for acute pathology or trauma.  EKG WNL.  Admit to any medical bed.  Fall precautions.  PT eval.     #Rhabdomyolysis- in setting of fall. Admission CK 1000. Gentle IVF ordered given hx of CHF and chronic lymphedema, trend CK.     #MATILDE- Suspect pre-renal and related to rhabo, will give gentle hydration of 75cc x 14 hour.  Trend Cr.     #Leukocytosis- WBC 17, suspect reactive from recent fall. RVP ordered as pt endorsing a dry cough, afebrile and no consolidation seen on my read of CXR. Trend CBC. Procalcitonin ordered for AM.     #HFpEF-  Has chronic lymphedema that pt reports unchanged. CXR on my read shows small pleural effusions, but no hypoxia or respiratory distress, so cautiously started on IVF for MATILDE, rhabdo as above.  Holding PO lasix for now, monitor closely for volume overload.  monitoring ordered. Carvedilol resumed.     #HTN- Carvedilol and isosorbide dinitrate resumed.     #Hypothyroidism- Synthroid resumed.     #Prophylactic measure- heparin.     #Advanced care planning- pt is DNR/DNI. MOLST form filled out and scanned into alpha. 16 minutes spent discussing.

## 2019-07-23 NOTE — PHYSICAL THERAPY INITIAL EVALUATION ADULT - ADDITIONAL COMMENTS
Pt reporting that he does not have steps to enter the home but he also does not leave his house. He has a visiting nurse but does everything for himself. He ambulates with a RW inside of the home, reporting that he does not wear shoes or socks. Per patient his feet always look as swollen as they are now. Pt would like to return home when d/c'd if safe.

## 2019-07-23 NOTE — CONSULT NOTE ADULT - ASSESSMENT
89 y/o male PMH HFpEF, hypothyroid, HTN, chronic lymphedema presents to ED s/p fall at home. Son at bedside, Pt karla x4, lives independently at home, has visiting RN and PA, monthly. Ambulates with walker, states yesterday went to bathroom, went to stand up, and "legs gave out on me". denies lightheaded, dizziness, loss of consciousness, trauma, chest pain, palpitations, shortness of breath, nausea, vomiting, diarrhea. Pt laid on floor for several hours prior to being able to crawl to phone to call EMS. In ED decompensated overnight, sudden onset difficulty breathing, wheezes, not alleviated with nebulizer; required NIPPV to maintain oxygen saturation, IV lasix administered.      Multifactorial dyspnea infectious vs acute on chronic HF  Lactate 2.2, leukocytosis   CT chest pending  febrile overnight- blood cultures pending   Shortness of breath requiring NIPPV    Acute on Chronic HFpEF  - TTE ordered  - Last known TTE 2017- normal LV systolic function, Mild to mod AS, mild MR  - Pro BNP pending   - troponin negative   - LAsix IV 40 mg daily  - Monitor renal function with ongoing diuresis.  - Strict i/o and daily standing weights (if possible).   - Keep K > 4, Mg > 2.    - continue coreg, isordil daily 89 y/o male PMH HFpEF, hypothyroid, HTN, chronic lymphedema presents to ED s/p fall at home. Son at bedside, Pt karla x4, lives independently at home, has visiting RN and PA, monthly. Ambulates with walker, states yesterday went to bathroom, went to stand up, and "legs gave out on me". denies lightheaded, dizziness, loss of consciousness, trauma, chest pain, palpitations, shortness of breath, nausea, vomiting, diarrhea. Pt laid on floor for several hours prior to being able to crawl to phone to call EMS. In ED decompensated overnight, sudden onset difficulty breathing, wheezes, not alleviated with nebulizer; required NIPPV to maintain oxygen saturation, IV lasix administered.      Multifactorial dyspnea infectious vs acute on chronic HF  Lactate 2.2, leukocytosis   CT chest pending  D-dimer ordered   consider VQ scan if D-dimer positive   febrile overnight- blood cultures pending   Shortness of breath requiring NIPPV    Acute on Chronic HFpEF  - TTE ordered  - Last known TTE 2017- normal LV systolic function, Mild to mod AS, mild MR  - Pro BNP pending   - troponin negative   - LAsix IV 40 mg one time- monitor BMP   - Monitor renal function with ongoing diuresis.  - Strict i/o and daily standing weights (if possible).   - Keep K > 4, Mg > 2.    - continue coreg, isordil daily

## 2019-07-23 NOTE — GOALS OF CARE CONVERSATION - PERSONAL ADVANCE DIRECTIVE - CONVERSATION DETAILS
Pt does not wish to be resuscitated with CPR in event of cardiac arrest or to be on mechanical ventilator for respiratory failure not responding to facial mask.  Pt amenable to treatment with IVF, IV antibiotics, BiPAP. Pt is DNR/DNI. MOLST form filled out and signed by patient.

## 2019-07-23 NOTE — H&P ADULT - HISTORY OF PRESENT ILLNESS
89yoM hx HTN, HFpEF, hypothyroidism, chronic lymphedema, presenting with unwitnessed fall at home.  Pt lives alone independently but has a visiting physician assistant and RN who checks on him every month.  He ambulates with a walker and reports that yesterday 89yoM hx HTN, HFpEF, hypothyroidism, chronic lymphedema, presenting with unwitnessed fall at home.  Pt lives alone independently but has a visiting physician assistant and RN who checks on him every month.  He ambulates with a walker and reports that yesterday while in the bathroom, he legs felt weak and he fell backwards with impact to the head.  Pt remembers the fall, did not lose consciousness and denies any lightheadedness, chest pain, dyspnea, palpitations preceding the fall.  Pt laid on his bathroom floor for several hours before crawling to phone in different room where he was able to call EMS.  Reports that lymphedema has been chronic for several years but has been relatively stable recently.  Pt reports a non-productive cough for few days, but denies any fevers, chills, dyspnea, abdominal pain, nausea, vomiting, diarrhea.     On admission, labs notable for WBC 17, MATILDE, CK 1000.  CT head and maxillofacial negative for acute pathology. 89yoM hx HTN, HFpEF, hypothyroidism, chronic lymphedema, presenting with unwitnessed fall at home.  Pt lives alone independently but has a visiting physician assistant and RN who checks on him every month.  He ambulates with a walker and reports that yesterday while in the bathroom, he legs felt weak and he fell backwards with impact to the head.  Pt remembers the fall, did not lose consciousness and denies any lightheadedness, chest pain, dyspnea, palpitations preceding the fall.  Pt laid on his bathroom floor for several hours before crawling to phone in different room where he was able to call EMS.  Reports that lymphedema has been chronic for several years but has been relatively stable recently.  Pt reports a non-productive cough for few days, but denies any fevers, chills, dyspnea, abdominal pain, nausea, vomiting, diarrhea. On admission, labs notable for WBC 17, MATILDE, CK 1000.  CT head and maxillofacial negative for acute pathology.

## 2019-07-24 LAB
ANION GAP SERPL CALC-SCNC: 12 MMOL/L — SIGNIFICANT CHANGE UP (ref 5–17)
ANISOCYTOSIS BLD QL: SLIGHT — SIGNIFICANT CHANGE UP
BASOPHILS # BLD AUTO: 0.12 K/UL — SIGNIFICANT CHANGE UP (ref 0–0.2)
BASOPHILS NFR BLD AUTO: 0.9 % — SIGNIFICANT CHANGE UP (ref 0–2)
BUN SERPL-MCNC: 60 MG/DL — HIGH (ref 8–20)
CALCIUM SERPL-MCNC: 8.9 MG/DL — SIGNIFICANT CHANGE UP (ref 8.6–10.2)
CHLORIDE SERPL-SCNC: 103 MMOL/L — SIGNIFICANT CHANGE UP (ref 98–107)
CK MB CFR SERPL CALC: 12.2 NG/ML — HIGH (ref 0–6.7)
CK SERPL-CCNC: 1230 U/L — HIGH (ref 30–200)
CO2 SERPL-SCNC: 21 MMOL/L — LOW (ref 22–29)
CREAT SERPL-MCNC: 2.43 MG/DL — HIGH (ref 0.5–1.3)
EOSINOPHIL # BLD AUTO: 0 K/UL — SIGNIFICANT CHANGE UP (ref 0–0.5)
EOSINOPHIL NFR BLD AUTO: 0 % — SIGNIFICANT CHANGE UP (ref 0–6)
GIANT PLATELETS BLD QL SMEAR: PRESENT — SIGNIFICANT CHANGE UP
GLUCOSE SERPL-MCNC: 128 MG/DL — HIGH (ref 70–115)
HCT VFR BLD CALC: 34.8 % — LOW (ref 39–50)
HGB BLD-MCNC: 10.8 G/DL — LOW (ref 13–17)
HYPOCHROMIA BLD QL: SLIGHT — SIGNIFICANT CHANGE UP
LYMPHOCYTES # BLD AUTO: 0.48 K/UL — LOW (ref 1–3.3)
LYMPHOCYTES # BLD AUTO: 3.5 % — LOW (ref 13–44)
MACROCYTES BLD QL: SLIGHT — SIGNIFICANT CHANGE UP
MANUAL SMEAR VERIFICATION: SIGNIFICANT CHANGE UP
MCHC RBC-ENTMCNC: 31 GM/DL — LOW (ref 32–36)
MCHC RBC-ENTMCNC: 31 PG — SIGNIFICANT CHANGE UP (ref 27–34)
MCV RBC AUTO: 100 FL — SIGNIFICANT CHANGE UP (ref 80–100)
MICROCYTES BLD QL: SLIGHT — SIGNIFICANT CHANGE UP
MONOCYTES # BLD AUTO: 0.59 K/UL — SIGNIFICANT CHANGE UP (ref 0–0.9)
MONOCYTES NFR BLD AUTO: 4.3 % — SIGNIFICANT CHANGE UP (ref 2–14)
NEUTROPHILS # BLD AUTO: 12.59 K/UL — HIGH (ref 1.8–7.4)
NEUTROPHILS NFR BLD AUTO: 87 % — HIGH (ref 43–77)
NEUTS BAND # BLD: 4.3 % — SIGNIFICANT CHANGE UP (ref 0–8)
NT-PROBNP SERPL-SCNC: 5553 PG/ML — HIGH (ref 0–300)
PLAT MORPH BLD: ABNORMAL
PLATELET # BLD AUTO: 224 K/UL — SIGNIFICANT CHANGE UP (ref 150–400)
PLATELET CLUMP BLD QL SMEAR: SLIGHT
PLATELET COUNT - ESTIMATE: NORMAL — SIGNIFICANT CHANGE UP
POIKILOCYTOSIS BLD QL AUTO: SLIGHT — SIGNIFICANT CHANGE UP
POLYCHROMASIA BLD QL SMEAR: SLIGHT — SIGNIFICANT CHANGE UP
POTASSIUM SERPL-MCNC: 4.6 MMOL/L — SIGNIFICANT CHANGE UP (ref 3.5–5.3)
POTASSIUM SERPL-SCNC: 4.6 MMOL/L — SIGNIFICANT CHANGE UP (ref 3.5–5.3)
RBC # BLD: 3.48 M/UL — LOW (ref 4.2–5.8)
RBC # FLD: 13.7 % — SIGNIFICANT CHANGE UP (ref 10.3–14.5)
RBC BLD AUTO: ABNORMAL
SODIUM SERPL-SCNC: 136 MMOL/L — SIGNIFICANT CHANGE UP (ref 135–145)
WBC # BLD: 13.79 K/UL — HIGH (ref 3.8–10.5)
WBC # FLD AUTO: 13.79 K/UL — HIGH (ref 3.8–10.5)

## 2019-07-24 PROCEDURE — 99233 SBSQ HOSP IP/OBS HIGH 50: CPT

## 2019-07-24 PROCEDURE — 93306 TTE W/DOPPLER COMPLETE: CPT | Mod: 26

## 2019-07-24 PROCEDURE — 99222 1ST HOSP IP/OBS MODERATE 55: CPT

## 2019-07-24 PROCEDURE — 99232 SBSQ HOSP IP/OBS MODERATE 35: CPT

## 2019-07-24 RX ORDER — SODIUM CHLORIDE 9 MG/ML
1000 INJECTION INTRAMUSCULAR; INTRAVENOUS; SUBCUTANEOUS
Refills: 0 | Status: DISCONTINUED | OUTPATIENT
Start: 2019-07-24 | End: 2019-07-25

## 2019-07-24 RX ORDER — ISOSORBIDE MONONITRATE 60 MG/1
30 TABLET, EXTENDED RELEASE ORAL DAILY
Refills: 0 | Status: DISCONTINUED | OUTPATIENT
Start: 2019-07-24 | End: 2019-07-31

## 2019-07-24 RX ADMIN — AZITHROMYCIN 255 MILLIGRAM(S): 500 TABLET, FILM COATED ORAL at 10:36

## 2019-07-24 RX ADMIN — Medication 325 MILLIGRAM(S): at 12:39

## 2019-07-24 RX ADMIN — Medication 3 MILLILITER(S): at 15:55

## 2019-07-24 RX ADMIN — CARVEDILOL PHOSPHATE 12.5 MILLIGRAM(S): 80 CAPSULE, EXTENDED RELEASE ORAL at 06:05

## 2019-07-24 RX ADMIN — Medication 3 MILLILITER(S): at 02:51

## 2019-07-24 RX ADMIN — Medication 600 MILLIGRAM(S): at 12:38

## 2019-07-24 RX ADMIN — Medication 50 MICROGRAM(S): at 06:06

## 2019-07-24 RX ADMIN — HEPARIN SODIUM 5000 UNIT(S): 5000 INJECTION INTRAVENOUS; SUBCUTANEOUS at 17:30

## 2019-07-24 RX ADMIN — SODIUM CHLORIDE 75 MILLILITER(S): 9 INJECTION INTRAMUSCULAR; INTRAVENOUS; SUBCUTANEOUS at 12:40

## 2019-07-24 RX ADMIN — CEFTRIAXONE 100 MILLIGRAM(S): 500 INJECTION, POWDER, FOR SOLUTION INTRAMUSCULAR; INTRAVENOUS at 06:05

## 2019-07-24 RX ADMIN — Medication 250 MILLIGRAM(S): at 17:30

## 2019-07-24 RX ADMIN — Medication 3 MILLILITER(S): at 21:05

## 2019-07-24 RX ADMIN — HEPARIN SODIUM 5000 UNIT(S): 5000 INJECTION INTRAVENOUS; SUBCUTANEOUS at 22:00

## 2019-07-24 RX ADMIN — Medication 600 MILLIGRAM(S): at 17:30

## 2019-07-24 RX ADMIN — Medication 250 MILLIGRAM(S): at 06:05

## 2019-07-24 RX ADMIN — HEPARIN SODIUM 5000 UNIT(S): 5000 INJECTION INTRAVENOUS; SUBCUTANEOUS at 06:06

## 2019-07-24 NOTE — PROGRESS NOTE ADULT - SUBJECTIVE AND OBJECTIVE BOX
CC: resp failure, tristen    Patient seen and examined at the bedside. No acute overnight events. admitted yesterday. Complaining of sob + non productive cough today. Denies fever/chills, headache, lightheadedness, dizziness, chest pain, palpitations, abd pain, nausea/vomiting/diarrhea, muscle pain.      =========================================================================================  T(C): 36.8 (19 @ 07:19), Max: 37.2 (19 @ 17:08)  HR: 63 (19 @ 07:19) (63 - 83)  BP: 100/46 (19 @ 07:19) (99/58 - 125/92)  RR: 18 (19 @ 07:19) (18 - 20)  SpO2: 97% (19 @ 07:19) (95% - 100%)    PHYSICAL EXAM.    GEN - appears younger than age. obese. pleasant. no distress.   HEENT - NCAT, EOMI, RADHA  RESP - diffuse course rhonchi throughout. no wheeze/stridor. on supplemental O2, nc. able to speak in full sentences without distress.   CARDIO - NS1S2, RRR. No murmurs/rubs/gallops.  ABD - Soft/Non tender/Non distended. Normal BS x4 quadrants. no guarding/rebound tenderness.  Ext - BL DEVANG, +3 non pitting.  MSK - BL 5/5 strength on upper and lower extremities.   Neuro - AAOx3. cn 2-12 grossly intact  Psych - normal affect  Skin - BL hypertrocphic skin changes LE sec to chronic lymphedema, no stasis ulcers noted      I&O's Summary    Daily     Daily     =========================================================================================  LABS.        136  |  103  |  60.0<H>  ----------------------------<  128<H>  4.6   |  21.0<L>  |  2.43<H>    Ca    8.9      2019 07:11    TPro  7.5  /  Alb  3.8  /  TBili  0.7  /  DBili  x   /  AST  34  /  ALT  13  /  AlkPhos  77                            12.0   13.61 )-----------( 199      ( 2019 18:20 )             38.8     LIVER FUNCTIONS - ( 2019 23:18 )  Alb: 3.8 g/dL / Pro: 7.5 g/dL / ALK PHOS: 77 U/L / ALT: 13 U/L / AST: 34 U/L / GGT: x             CARDIAC MARKERS ( 2019 07:11 )  x     / x     / 1230 U/L / x     / 12.2 ng/mL  CARDIAC MARKERS ( 2019 18:20 )  x     / 0.02 ng/mL / x     / x     / x      CARDIAC MARKERS ( 2019 07:28 )  x     / 0.02 ng/mL / 1368 U/L / x     / 6.5 ng/mL  CARDIAC MARKERS ( 2019 23:18 )  x     / <0.01 ng/mL / 1001 U/L / x     / 5.3 ng/mL      Urinalysis Basic - ( 2019 01:07 )    Color: Yellow / Appearance: Clear / S.015 / pH: x  Gluc: x / Ketone: Trace  / Bili: Negative / Urobili: Negative mg/dL   Blood: x / Protein: 30 mg/dL / Nitrite: Negative   Leuk Esterase: Trace / RBC: 3-5 /HPF / WBC 3-5   Sq Epi: x / Non Sq Epi: Occasional / Bacteria: Few      ABG - ( 2019 18:11 )  pH, Arterial: 7.40  pH, Blood: x     /  pCO2: 36    /  pO2: 76    / HCO3: 22    / Base Excess: -2.3  /  SaO2: 96                  =========================================================================================  IMAGING.     < from: CT Chest No Cont (19 @ 22:45) >  FINDINGS:    Lungs: Dense airspace disease/consolidation left lower lobe, and left   upper   lobe adjacent to the major fissure. Pulmonary parenchymal calcification   consistent with remote granulomatous organism exposure.    Pleural space: Unremarkable. No pneumothorax. No pleural effusion.    Heart: Coronary artery calcifications noted.    Aorta: Aorta demonstrates mild atherosclerotic calcification.    Lymph nodes: Unremarkable. No enlarged lymph nodes.    Bones/joints: Unremarkable. No acute fracture.    Soft tissues: Unremarkable.     IMPRESSION:   Large lobar consolidation of the entire left lower lobe indicative of   pneumonia. Follow-up to complete resolution.    < end of copied text >    =========================================================================================    HOME MEDS.    Home Medications:  carvedilol 12.5 mg oral tablet: 1 tab(s) orally 2 times a day (2019 02:47)  isosorbide dinitrate 30 mg oral tablet: orally once a day (2019 02:47)      =========================================================================================    HOSPITAL MEDS.    MEDICATIONS  (STANDING):  ALBUTerol/ipratropium for Nebulization 3 milliLiter(s) Nebulizer every 6 hours  aspirin enteric coated 325 milliGRAM(s) Oral daily  azithromycin  IVPB      azithromycin  IVPB 500 milliGRAM(s) IV Intermittent every 24 hours  carvedilol 12.5 milliGRAM(s) Oral every 12 hours  cefTRIAXone   IVPB 1000 milliGRAM(s) IV Intermittent every 24 hours  heparin  Injectable 5000 Unit(s) SubCutaneous every 8 hours  isosorbide   dinitrate Tablet (ISORDIL) 30 milliGRAM(s) Oral daily  levothyroxine 50 MICROGram(s) Oral daily  saccharomyces boulardii 250 milliGRAM(s) Oral two times a day    MEDICATIONS  (PRN):  ALBUTerol    0.083% 2.5 milliGRAM(s) Nebulizer every 4 hours PRN Shortness of Breath and/or Wheezing  benzonatate 100 milliGRAM(s) Oral three times a day PRN Cough

## 2019-07-24 NOTE — CONSULT NOTE ADULT - ASSESSMENT
S/p fall with Rhabdomyolysis and MATILDE  Complicating Left pneumonia    Plan:  O2  ABX  hydration  echo  c/u CCT in 8wks for resolution  Little else to add  Please recall with any issues

## 2019-07-24 NOTE — PROGRESS NOTE ADULT - SUBJECTIVE AND OBJECTIVE BOX
[facility]                                                        Office: [address] [address]                                                       Telephone: [phone]. Fax:[phone]                                                                             PROGRESS NOTE    Subjective:  Patient states no change in his condition, "not better or not worse     Review of symptoms:   Cardiac:  No chest pain. + dyspnea. No palpitations.  Respiratory:no cough. No dyspnea  Gastrointestinal: No diarrhea. No abdominal pain. No bleeding.   Neuro: No focal neuro complaints.      	  Vitals:  T(C): 36.7 (07-24-19 @ 11:16), Max: 37.2 (07-23-19 @ 17:08)  HR: 71 (07-24-19 @ 11:16) (63 - 83)  BP: 100/47 (07-24-19 @ 11:16) (99/58 - 125/92)  RR: 18 (07-24-19 @ 07:19) (18 - 20)  SpO2: 97% (07-24-19 @ 07:19) (95% - 99%)  Wt(kg): --  I&O's Summary    Weight (kg): 104.3 (07-22 @ 16:34)    PHYSICAL EXAM:  Appearance: Comfortable. No acute distress  HEENT:  Head and neck: Atraumatic. Normocephalic. , Neck is supple. No JVD,   Neurologic: Alert and awake, Grossly nonfocal.   Lymphatic: No cervical lymphadenopathy  Cardiovascular: Normal S1 S2, No murmurs. No JVD,   Respiratory: Bilateral occasional rhonchi,  Gastrointestinal:  Soft, Non-tender, + BS  Lower Extremities: Chronic lymphedema  Psychiatry: Patient is calm. No agitation.    CURRENT MEDICATIONS:    MEDICATIONS  (STANDING):  ALBUTerol/ipratropium for Nebulization 3 milliLiter(s) Nebulizer every 6 hours  aspirin enteric coated 325 milliGRAM(s) Oral daily  azithromycin  IVPB      azithromycin  IVPB 500 milliGRAM(s) IV Intermittent every 24 hours  carvedilol 12.5 milliGRAM(s) Oral every 12 hours  cefTRIAXone   IVPB 1000 milliGRAM(s) IV Intermittent every 24 hours  guaiFENesin  milliGRAM(s) Oral every 12 hours  heparin  Injectable 5000 Unit(s) SubCutaneous every 8 hours  isosorbide   mononitrate ER Tablet (IMDUR) 30 milliGRAM(s) Oral daily  levothyroxine 50 MICROGram(s) Oral daily  saccharomyces boulardii 250 milliGRAM(s) Oral two times a day  sodium chloride 0.9%. 1000 milliLiter(s) (75 mL/Hr) IV Continuous <Continuous>      LABS:	 	                          10.8   13.79 )-----------( 224      ( 24 Jul 2019 11:33 )             34.8     07-24    136  |  103  |  60.0<H>  ----------------------------<  128<H>  4.6   |  21.0<L>  |  2.43<H>    Ca    8.9      24 Jul 2019 07:11    TPro  7.5  /  Alb  3.8  /  TBili  0.7  /  DBili  x   /  AST  34  /  ALT  13  /  AlkPhos  77  07-22    proBNP: Serum Pro-Brain Natriuretic Peptide: 5553 pg/mL (07-24 @ 11:33)  Serum Pro-Brain Natriuretic Peptide: 4177 pg/mL (07-23 @ 07:29)    Lipid Profile:   CARDIAC MARKERS ( 24 Jul 2019 07:11 )  x     / x     / 1230 U/L / x     / 12.2 ng/mL  CARDIAC MARKERS ( 23 Jul 2019 18:20 )  x     / 0.02 ng/mL / x     / x     / x      CARDIAC MARKERS ( 23 Jul 2019 07:28 )  x     / 0.02 ng/mL / 1368 U/L / x     / 6.5 ng/mL  CARDIAC MARKERS ( 22 Jul 2019 23:18 )  x     / <0.01 ng/mL / 1001 U/L / x     / 5.3 ng/mL      LIVER FUNCTIONS - ( 22 Jul 2019 23:18 )  Alb: 3.8 g/dL / Pro: 7.5 g/dL / ALK PHOS: 77 U/L / ALT: 13 U/L / AST: 34 U/L / GGT: x             TELEMETRY: Reviewed  - No significant arrhythmias    ECG:    < from: TTE Echo Complete w/Doppler (07.24.19 @ 09:21) >  Summary:   1. Left ventricular ejection fraction, by visual estimation, is 60 to   65%.   2. Normal global left ventricular systolic function.   3. LV Ejection Fraction by Roman's Method with a biplane EF of 68 %.   4. Mildly increased LV wall thickness.   5. Normal left ventricular internal cavity size.   6. Trivial pericardial effusion.   7. Peak transmitral mean gradient equals 5.6 mmHg, calculated mitral   valve area by pressure half time equals 2.20 cm² consistent with mild   mitral stenosis.   8. Severe mitral annular calcification.   9. Thickening of the anterior and posterior mitral valve leaflets.  10. Endocardial visualization was enhanced with intravenous echo contrast.  11. Mitral valve mean gradient is 5.6 mmHg consistent with mild mitral   stenosis.  12. Peak transaortic gradient equals 22.0 mmHg, mean transaortic gradient   equals 12.5 mmHg, the calculated aortic valve area equals 1.07 cm² by the   continuity equation consistent with mild aortic stenosis.  13. The aortic valve mean gradient is 12.5 mmHg consistent with mild   aortic stenosis.  14. There is severe aortic root calcification.    MD Tremaine Electronically signed on 7/24/2019 at 12:24:53 PM    < end of copied text >

## 2019-07-24 NOTE — CONSULT NOTE ADULT - ASSESSMENT
1) CKD III  2) Rhabdo  3) HTN  4) CHF    UA with micro  Natanael, UCl, UOsm  Trend CPK  GENTLE IVF ; NS @ 75cc/hr for 24-48 hrs  Signing off  d/w Dr Grubbs

## 2019-07-24 NOTE — CONSULT NOTE ADULT - SUBJECTIVE AND OBJECTIVE BOX
PULMONARY CONSULT NOTE      TARA FAULKNERJORGE [id number]    Patient is a 89y old  Male who presents with a chief complaint of fall, rhabdo, MATILDE (2019 10:34)      HISTORY OF PRESENT ILLNESS:  89yoM hx HTN, HFpEF, hypothyroidism, chronic lymphedema, presenting with unwitnessed fall at home.  Pt lives alone independently but has a visiting physician assistant and RN who checks on him every month.  He ambulates with a walker and reports that yesterday while in the bathroom, he legs felt weak and he fell backwards with impact to the head.  Pt remembers the fall, did not lose consciousness and denies any lightheadedness, chest pain, dyspnea, palpitations preceding the fall.  Pt laid on his bathroom floor for several hours before crawling to phone in different room where he was able to call EMS.  Reports that lymphedema has been chronic for several years but has been relatively stable recently.  Pt reports a non-productive cough for few days, but denies any fevers, chills, dyspnea, abdominal pain, nausea, vomiting, diarrhea. On admission, labs notable for WBC 17, MATILDE, CK 1000.  CT head and maxillofacial negative for acute pathology.     MEDICATIONS  (STANDING):  ALBUTerol/ipratropium for Nebulization 3 milliLiter(s) Nebulizer every 6 hours  aspirin enteric coated 325 milliGRAM(s) Oral daily  azithromycin  IVPB      azithromycin  IVPB 500 milliGRAM(s) IV Intermittent every 24 hours  carvedilol 12.5 milliGRAM(s) Oral every 12 hours  cefTRIAXone   IVPB 1000 milliGRAM(s) IV Intermittent every 24 hours  guaiFENesin  milliGRAM(s) Oral every 12 hours  heparin  Injectable 5000 Unit(s) SubCutaneous every 8 hours  isosorbide   mononitrate ER Tablet (IMDUR) 30 milliGRAM(s) Oral daily  levothyroxine 50 MICROGram(s) Oral daily  saccharomyces boulardii 250 milliGRAM(s) Oral two times a day  sodium chloride 0.9%. 1000 milliLiter(s) (75 mL/Hr) IV Continuous <Continuous>      MEDICATIONS  (PRN):  ALBUTerol    0.083% 2.5 milliGRAM(s) Nebulizer every 4 hours PRN Shortness of Breath and/or Wheezing  benzonatate 100 milliGRAM(s) Oral three times a day PRN Cough      Allergies    No Known Allergies    Intolerances        PAST MEDICAL & SURGICAL HISTORY:  Prostate enlargement  Hypothyroidism, unspecified type  HTN (hypertension)  CHF (congestive heart failure)  History of prostate surgery      FAMILY HISTORY:  No pertinent family history in first degree relatives      SOCIAL HISTORY  Smoking History:     REVIEW OF SYSTEMS:    CONSTITUTIONAL:  No fevers, chills, sweats    HEENT:  Eyes:  No diplopia or blurred vision. ENT:  No earache, sore throat or runny nose. sinus headache or postnasl drip    CARDIOVASCULAR:  No pressure, squeezing, tightness, or heaviness about the chest; no palpitations, leg swelling, orthopnea or PND    RESPIRATORY:  No cough, shortness of breath, PND or orthopnea. Mild SOBOE    GASTROINTESTINAL:  No abdominal pain, nausea, vomiting or diarrhea.    GENITOURINARY:  No dysuria, frequency or urgency.    NEUROLOGIC:  No paresthesias, fasciculations, seizures or weakness.    PSYCHIATRIC:  No disorder of thought or mood.    Vital Signs Last 24 Hrs  T(C): 36.7 (2019 11:16), Max: 37.2 (2019 17:08)  T(F): 98.1 (2019 11:16), Max: 98.9 (2019 17:08)  HR: 71 (2019 11:16) (63 - 83)  BP: 100/47 (2019 11:16) (99/58 - 125/92)  BP(mean): --  RR: 18 (2019 07:19) (18 - 20)  SpO2: 97% (2019 07:19) (95% - 100%)    PHYSICAL EXAMINATION:    GENERAL: The patient is a well-developed, well-nourished _____in no apparent distress.     HEENT: Head is normocephalic and atraumatic. Extraocular muscles are intact. Mucous membranes are moist.     NECK: Supple.     LUNGS: Clear to auscultation without wheezing, rales, or rhonchi. Respirations unlabored    HEART: Regular rate and rhythm without murmur.    ABDOMEN: Soft, nontender, and nondistended.  No hepatosplenomegaly is noted.    EXTREMITIES: Without any cyanosis, clubbing, rash, lesions or edema.    NEUROLOGIC: Grossly intact.      LABS:                        10.8   13.79 )-----------( 224      ( 2019 11:33 )             34.8         136  |  103  |  60.0<H>  ----------------------------<  128<H>  4.6   |  21.0<L>  |  2.43<H>    Ca    8.9      2019 07:11    TPro  7.5  /  Alb  3.8  /  TBili  0.7  /  DBili  x   /  AST  34  /  ALT  13  /  AlkPhos  77  07-22      Urinalysis Basic - ( 2019 01:07 )    Color: Yellow / Appearance: Clear / S.015 / pH: x  Gluc: x / Ketone: Trace  / Bili: Negative / Urobili: Negative mg/dL   Blood: x / Protein: 30 mg/dL / Nitrite: Negative   Leuk Esterase: Trace / RBC: 3-5 /HPF / WBC 3-5   Sq Epi: x / Non Sq Epi: Occasional / Bacteria: Few      ABG - ( 2019 18:11 )  pH, Arterial: 7.40  pH, Blood: x     /  pCO2: 36    /  pO2: 76    / HCO3: 22    / Base Excess: -2.3  /  SaO2: 96          Creatine Kinase, Serum (19 @ 07:28)    Creatine Kinase, Serum: 1368 U/L          CARDIAC MARKERS ( 2019 07:11 )  x     / x     / 1230 U/L / x     / 12.2 ng/mL  CARDIAC MARKERS ( 2019 18:20 )  x     / 0.02 ng/mL / x     / x     / x      CARDIAC MARKERS ( 2019 07:28 )  x     / 0.02 ng/mL / 1368 U/L / x     / 6.5 ng/mL  CARDIAC MARKERS ( 2019 23:18 )  x     / <0.01 ng/mL / 1001 U/L / x     / 5.3 ng/mL        Serum Pro-Brain Natriuretic Peptide: 5553 pg/mL (19 @ 11:33)  Serum Pro-Brain Natriuretic Peptide: 4177 pg/mL (19 @ 07:29)      Procalcitonin, Serum: 4.64 ng/mL (19 @ 07:28)      MICROBIOLOGY:    RADIOLOGY & ADDITIONAL STUDIES:  < from: CT Chest No Cont (19 @ 22:45) >   EXAM:  CT CHEST                          PROCEDURE DATE:  2019          INTERPRETATION:  CT CHEST    CLINICAL INFORMATION:  Fall, chest pain     EXAM DATE/TIME:    2019 10:29 PM     CLINICAL HISTORY:    89 years old, male; Pain; Other:Eval for pna     TECHNIQUE:    Imaging protocol: Axial computed tomography images of the chest without   intravenous contrast. Coronal and sagittal reformatted images were   created and   reviewed.    3D rendering: MIP reconstructed images were created and reviewed.    Radiation optimization: All CT scans at this facility use at least one   of   these dose optimization techniques: automated exposure control; mA and/or   kV   adjustment per patient size (includes targeted exams where dose is   matched to   clinical indication); or iterative reconstruction.     COMPARISON:    CR XR CHEST URGENT 2019 6:41 PM     FINDINGS:    Lungs: Dense airspace disease/consolidation left lower lobe, and left   upper   lobe adjacent to the major fissure. Pulmonary parenchymal calcification   consistent with remote granulomatous organism exposure.    Pleural space: Unremarkable. No pneumothorax. No pleural effusion.    Heart: Coronary artery calcifications noted.    Aorta: Aorta demonstrates mild atherosclerotic calcification.    Lymph nodes: Unremarkable. No enlarged lymph nodes.    Bones/joints: Unremarkable. No acute fracture.    Soft tissues: Unremarkable.     IMPRESSION:   Large lobar consolidation of the entire left lower lobe indicative of   pneumonia. Follow-up to complete resolution.                ALVA HERNANDEZ M.D., ATTENDING RADIOLOGIST  This document has been electronically signed. 2019 10:27AM    < end of copied text >  < from: CT Head No Cont (19 @ 21:35) >     EXAM:  CT MAXILLOFACIAL                         EXAM:  CT BRAIN                          PROCEDURE DATE:  2019          INTERPRETATION:  HISTORY: Trauma.    COMPARISON: None     TECHNIQUE: Axial noncontrast CT images of the head and face were obtained   and submitted for interpretation. Sagittal and coronal reformatted images   were provided. Bone and soft tissue windows were evaluated. 3D   reformatted images of the face were obtained.    FINDINGS:     CT BRAIN:     There is no acute intracranial mass-effect, hemorrhage, midline shift, or   abnormal extra-axial fluid collection.    Mild chronic microvascular ischemic changes are noted. Atheromatous   calcifications along the carotid siphons are noted.    Ventricles, sulci, and cisterns are normal in size for the patient's age   without evidence of hydrocephalus. Basal cisterns are patent.     The calvarium is intact. Bilateral lens surgery and scleral   calcifications.    CT FACE    There is no facial or orbital fracture. The globes are intact without   retrobulbar hematoma. The lenses are located bilaterally.    The mandibular condyles are located without fracture. The   temporomandibular joints are intact.     Left maxillary sinus polyp or retention cyst is noted. Remaining   paranasal sinuses are clear.    The nasopharyngeal contours are unremarkable.    Scattered lucencies in the cervical spine are nonspecific and may be   related to osteopenic changes.    IMPRESSION:     CT BRAIN: No acute intracranial bleeding, mass effect, or shift.     CT FACE: No facial or orbital fracture.                MARQUIS MERRILL M.D., ATTENDING RADIOLOGIST  This document has been electronically signed. 2019 10:07PM        < end of copied text > PULMONARY CONSULT NOTE      TARA FAULKNERJORGE [id number]    Patient is a 89y old  Male who presents with a chief complaint of fall, rhabdo, MATILDE (2019 10:34)      HISTORY OF PRESENT ILLNESS:  89yoM hx HTN, HFpEF, hypothyroidism, chronic lymphedema, presenting with unwitnessed fall at home.  Pt lives alone independently but has a visiting physician assistant and RN who checks on him every month.  He ambulates with a walker and reports that yesterday while in the bathroom, he legs felt weak and he fell backwards with impact to the head.  Pt remembers the fall, did not lose consciousness and denies any lightheadedness, chest pain, dyspnea, palpitations preceding the fall.  Pt laid on his bathroom floor for several hours before crawling to phone in different room where he was able to call EMS.  Reports that lymphedema has been chronic for several years but has been relatively stable recently.  Pt reports a non-productive cough for few days, but denies any fevers, chills, dyspnea, abdominal pain, nausea, vomiting, diarrhea. On admission, labs notable for WBC 17, MATILDE, CK 1000.  CT head and maxillofacial negative for acute pathology.     Pt denies LOC or vomiting. No recent fever or chills    MEDICATIONS  (STANDING):  ALBUTerol/ipratropium for Nebulization 3 milliLiter(s) Nebulizer every 6 hours  aspirin enteric coated 325 milliGRAM(s) Oral daily  azithromycin  IVPB      azithromycin  IVPB 500 milliGRAM(s) IV Intermittent every 24 hours  carvedilol 12.5 milliGRAM(s) Oral every 12 hours  cefTRIAXone   IVPB 1000 milliGRAM(s) IV Intermittent every 24 hours  guaiFENesin  milliGRAM(s) Oral every 12 hours  heparin  Injectable 5000 Unit(s) SubCutaneous every 8 hours  isosorbide   mononitrate ER Tablet (IMDUR) 30 milliGRAM(s) Oral daily  levothyroxine 50 MICROGram(s) Oral daily  saccharomyces boulardii 250 milliGRAM(s) Oral two times a day  sodium chloride 0.9%. 1000 milliLiter(s) (75 mL/Hr) IV Continuous <Continuous>      MEDICATIONS  (PRN):  ALBUTerol    0.083% 2.5 milliGRAM(s) Nebulizer every 4 hours PRN Shortness of Breath and/or Wheezing  benzonatate 100 milliGRAM(s) Oral three times a day PRN Cough      Allergies    No Known Allergies    Intolerances        PAST MEDICAL & SURGICAL HISTORY:  Prostate enlargement  Hypothyroidism, unspecified type  HTN (hypertension)  CHF (congestive heart failure)  History of prostate surgery      FAMILY HISTORY:  No pertinent family history in first degree relatives      SOCIAL HISTORY  Smoking History:     REVIEW OF SYSTEMS:    CONSTITUTIONAL:  No fevers, chills, sweats    HEENT:  Eyes:  No diplopia or blurred vision. ENT:  No earache, sore throat or runny nose. sinus headache or postnasl drip    CARDIOVASCULAR:  No pressure, squeezing, tightness, or heaviness about the chest; no palpitations, leg swelling, orthopnea or PND    RESPIRATORY:  above    GASTROINTESTINAL:  No abdominal pain, nausea, vomiting or diarrhea.    GENITOURINARY:  No dysuria, frequency or urgency.    NEUROLOGIC:  No paresthesias, fasciculations, seizures or weakness.    PSYCHIATRIC:  No disorder of thought or mood.    Vital Signs Last 24 Hrs  T(C): 36.7 (2019 11:16), Max: 37.2 (2019 17:08)  T(F): 98.1 (2019 11:16), Max: 98.9 (2019 17:08)  HR: 71 (2019 11:16) (63 - 83)  BP: 100/47 (2019 11:16) (99/58 - 125/92)  BP(mean): --  RR: 18 (2019 07:19) (18 - 20)  SpO2: 97% (2019 07:19) (95% - 100%)    PHYSICAL EXAMINATION:    GENERAL: The patient is a well-developed, well-nourished _____in no apparent distress.     HEENT: Head is normocephalic and atraumatic. Extraocular muscles are intact. Mucous membranes are moist.     NECK: Supple.     LUNGS: Clear to auscultation without wheezing, rales, or rhonchi. Respirations unlabored    HEART: Regular rate and rhythm without murmur.    ABDOMEN: Soft, nontender, and nondistended.  No hepatosplenomegaly is noted.    EXTREMITIES: Without any cyanosis, clubbing, rash, lesions or edema.    NEUROLOGIC: Grossly intact.      LABS:                        10.8   13.79 )-----------( 224      ( 2019 11:33 )             34.8     07-24    136  |  103  |  60.0<H>  ----------------------------<  128<H>  4.6   |  21.0<L>  |  2.43<H>    Ca    8.9      2019 07:11    TPro  7.5  /  Alb  3.8  /  TBili  0.7  /  DBili  x   /  AST  34  /  ALT  13  /  AlkPhos  77  07-22      Urinalysis Basic - ( 2019 01:07 )    Color: Yellow / Appearance: Clear / S.015 / pH: x  Gluc: x / Ketone: Trace  / Bili: Negative / Urobili: Negative mg/dL   Blood: x / Protein: 30 mg/dL / Nitrite: Negative   Leuk Esterase: Trace / RBC: 3-5 /HPF / WBC 3-5   Sq Epi: x / Non Sq Epi: Occasional / Bacteria: Few      ABG - ( 2019 18:11 )  pH, Arterial: 7.40  pH, Blood: x     /  pCO2: 36    /  pO2: 76    / HCO3: 22    / Base Excess: -2.3  /  SaO2: 96          Creatine Kinase, Serum (19 @ 07:28)    Creatine Kinase, Serum: 1368 U/L          CARDIAC MARKERS ( 2019 07:11 )  x     / x     / 1230 U/L / x     / 12.2 ng/mL  CARDIAC MARKERS ( 2019 18:20 )  x     / 0.02 ng/mL / x     / x     / x      CARDIAC MARKERS ( 2019 07:28 )  x     / 0.02 ng/mL / 1368 U/L / x     / 6.5 ng/mL  CARDIAC MARKERS ( 2019 23:18 )  x     / <0.01 ng/mL / 1001 U/L / x     / 5.3 ng/mL        Serum Pro-Brain Natriuretic Peptide: 5553 pg/mL (19 @ 11:33)  Serum Pro-Brain Natriuretic Peptide: 4177 pg/mL (19 @ 07:29)      Procalcitonin, Serum: 4.64 ng/mL (19 @ 07:28)      MICROBIOLOGY:    RADIOLOGY & ADDITIONAL STUDIES:  < from: CT Chest No Cont (19 @ 22:45) >   EXAM:  CT CHEST                          PROCEDURE DATE:  2019          INTERPRETATION:  CT CHEST    CLINICAL INFORMATION:  Fall, chest pain     EXAM DATE/TIME:    2019 10:29 PM     CLINICAL HISTORY:    89 years old, male; Pain; Other:Eval for pna     TECHNIQUE:    Imaging protocol: Axial computed tomography images of the chest without   intravenous contrast. Coronal and sagittal reformatted images were   created and   reviewed.    3D rendering: MIP reconstructed images were created and reviewed.    Radiation optimization: All CT scans at this facility use at least one   of   these dose optimization techniques: automated exposure control; mA and/or   kV   adjustment per patient size (includes targeted exams where dose is   matched to   clinical indication); or iterative reconstruction.     COMPARISON:    CR XR CHEST URGENT 2019 6:41 PM     FINDINGS:    Lungs: Dense airspace disease/consolidation left lower lobe, and left   upper   lobe adjacent to the major fissure. Pulmonary parenchymal calcification   consistent with remote granulomatous organism exposure.    Pleural space: Unremarkable. No pneumothorax. No pleural effusion.    Heart: Coronary artery calcifications noted.    Aorta: Aorta demonstrates mild atherosclerotic calcification.    Lymph nodes: Unremarkable. No enlarged lymph nodes.    Bones/joints: Unremarkable. No acute fracture.    Soft tissues: Unremarkable.     IMPRESSION:   Large lobar consolidation of the entire left lower lobe indicative of   pneumonia. Follow-up to complete resolution.                ALVA HERNANDEZ M.D., ATTENDING RADIOLOGIST  This document has been electronically signed. 2019 10:27AM    < end of copied text >  < from: CT Head No Cont (19 @ 21:35) >     EXAM:  CT MAXILLOFACIAL                         EXAM:  CT BRAIN                          PROCEDURE DATE:  2019          INTERPRETATION:  HISTORY: Trauma.    COMPARISON: None     TECHNIQUE: Axial noncontrast CT images of the head and face were obtained   and submitted for interpretation. Sagittal and coronal reformatted images   were provided. Bone and soft tissue windows were evaluated. 3D   reformatted images of the face were obtained.    FINDINGS:     CT BRAIN:     There is no acute intracranial mass-effect, hemorrhage, midline shift, or   abnormal extra-axial fluid collection.    Mild chronic microvascular ischemic changes are noted. Atheromatous   calcifications along the carotid siphons are noted.    Ventricles, sulci, and cisterns are normal in size for the patient's age   without evidence of hydrocephalus. Basal cisterns are patent.     The calvarium is intact. Bilateral lens surgery and scleral   calcifications.    CT FACE    There is no facial or orbital fracture. The globes are intact without   retrobulbar hematoma. The lenses are located bilaterally.    The mandibular condyles are located without fracture. The   temporomandibular joints are intact.     Left maxillary sinus polyp or retention cyst is noted. Remaining   paranasal sinuses are clear.    The nasopharyngeal contours are unremarkable.    Scattered lucencies in the cervical spine are nonspecific and may be   related to osteopenic changes.    IMPRESSION:     CT BRAIN: No acute intracranial bleeding, mass effect, or shift.     CT FACE: No facial or orbital fracture.                MARQUIS MERRILL M.D., ATTENDING RADIOLOGIST  This document has been electronically signed. 2019 10:07PM        < end of copied text >

## 2019-07-24 NOTE — PATIENT PROFILE ADULT - ...
24-Jul-2019 04:27:16 Complex Repair And V-Y Plasty Text: The defect edges were debeveled with a #15 scalpel blade.  The primary defect was closed partially with a complex linear closure.  Given the location of the remaining defect, shape of the defect and the proximity to free margins a V-Y plasty was deemed most appropriate for complete closure of the defect.  Using a sterile surgical marker, an appropriate advancement flap was drawn incorporating the defect and placing the expected incisions within the relaxed skin tension lines where possible.    The area thus outlined was incised deep to adipose tissue with a #15 scalpel blade.  The skin margins were undermined to an appropriate distance in all directions utilizing iris scissors.

## 2019-07-24 NOTE — CONSULT NOTE ADULT - SUBJECTIVE AND OBJECTIVE BOX
[facility] DIVISION OF KIDNEY DISEASES AND HYPERTENSION -- INITIAL CONSULT NOTE  --------------------------------------------------------------------------------  HPI:  89yoM hx HTN, HFpEF, hypothyroidism, chronic lymphedema, presenting with unwitnessed fall at home.  Pt lives alone independently but has a visiting physician assistant and RN who checks on him every month.  He ambulates with a walker and reports that yesterday while in the bathroom, he legs felt weak and he fell backwards with impact to the head.  Pt remembers the fall, did not lose consciousness and denies any lightheadedness, chest pain, dyspnea, palpitations preceding the fall.  Pt laid on his bathroom floor for several hours before crawling to phone in different room where he was able to call EMS.  Reports that lymphedema has been chronic for several years but has been relatively stable recently.  Pt reports a non-productive cough for few days, but denies any fevers, chills, dyspnea, abdominal pain, nausea, vomiting, diarrhea. On admission, labs notable for WBC 17, MATILDE, CK 1000.  CT head and maxillofacial negative for acute pathology.   Pt seen/examined ; no complaints; tolerating IVF ;     PAST HISTORY  --------------------------------------------------------------------------------  PAST MEDICAL & SURGICAL HISTORY:  Prostate enlargement  Hypothyroidism, unspecified type  HTN (hypertension)  CHF (congestive heart failure)  History of prostate surgery    FAMILY HISTORY:  No pertinent family history in first degree relatives    PAST SOCIAL HISTORY:    ALLERGIES & MEDICATIONS  --------------------------------------------------------------------------------  Allergies    No Known Allergies    Intolerances      Standing Inpatient Medications  ALBUTerol/ipratropium for Nebulization 3 milliLiter(s) Nebulizer every 6 hours  aspirin enteric coated 325 milliGRAM(s) Oral daily  azithromycin  IVPB      azithromycin  IVPB 500 milliGRAM(s) IV Intermittent every 24 hours  carvedilol 12.5 milliGRAM(s) Oral every 12 hours  cefTRIAXone   IVPB 1000 milliGRAM(s) IV Intermittent every 24 hours  guaiFENesin  milliGRAM(s) Oral every 12 hours  heparin  Injectable 5000 Unit(s) SubCutaneous every 8 hours  isosorbide   mononitrate ER Tablet (IMDUR) 30 milliGRAM(s) Oral daily  levothyroxine 50 MICROGram(s) Oral daily  saccharomyces boulardii 250 milliGRAM(s) Oral two times a day  sodium chloride 0.9%. 1000 milliLiter(s) IV Continuous <Continuous>    PRN Inpatient Medications  ALBUTerol    0.083% 2.5 milliGRAM(s) Nebulizer every 4 hours PRN  benzonatate 100 milliGRAM(s) Oral three times a day PRN      REVIEW OF SYSTEMS  --------------------------------------------------------------------------------  Gen: No weight changes, fatigue, fevers/chills, weakness  Skin: No rashes  Head/Eyes/Ears/Mouth: No headache; Normal hearing; Normal vision w/o blurriness; No sinus pain/discomfort, sore throat  Respiratory: No dyspnea, cough, wheezing, hemoptysis  CV: No chest pain, PND, orthopnea  GI: No abdominal pain, diarrhea, constipation, nausea, vomiting, melena, hematochezia  : No increased frequency, dysuria, hematuria, nocturia  MSK: No joint pain/swelling; no back pain; no edema  Neuro: No dizziness/lightheadedness, weakness, seizures, numbness, tingling  Heme: No easy bruising or bleeding  Endo: No heat/cold intolerance  Psych: No significant nervousness, anxiety, stress, depression    All other systems were reviewed and are negative, except as noted.    VITALS/PHYSICAL EXAM  --------------------------------------------------------------------------------  T(C): 36.7 (07-24-19 @ 11:16), Max: 37.2 (07-23-19 @ 17:08)  HR: 71 (07-24-19 @ 11:16) (63 - 83)  BP: 100/47 (07-24-19 @ 11:16) (99/58 - 125/92)  RR: 18 (07-24-19 @ 07:19) (18 - 20)  SpO2: 97% (07-24-19 @ 07:19) (95% - 99%)  Wt(kg): --  Height (cm): 167.64 (07-22-19 @ 16:34)  Weight (kg): 104.3 (07-22-19 @ 16:34)  BMI (kg/m2): 37.1 (07-22-19 @ 16:34)  BSA (m2): 2.12 (07-22-19 @ 16:34)      Physical Exam:  	Gen: NAD, well-appearing  	HEENT: PERRL, supple neck, clear oropharynx  	Pulm: CTA B/L  	CV: RRR, S1S2; no rub  	Back: No spinal or CVA tenderness; no sacral edema  	Abd: +BS, soft, nontender/nondistended  	: No suprapubic tenderness  	UE: Warm, FROM, no clubbing, intact strength; no edema; no asterixis  	LE: Warm, FROM, no clubbing, intact strength; no edema  	Neuro: No focal deficits, intact gait  	Psych: Normal affect and mood  	Skin: Warm, without rashes  	Vascular access:    LABS/STUDIES  --------------------------------------------------------------------------------              10.8   13.79 >-----------<  224      [07-24-19 @ 11:33]              34.8     136  |  103  |  60.0  ----------------------------<  128      [07-24-19 @ 07:11]  4.6   |  21.0  |  2.43        Ca     8.9     [07-24-19 @ 07:11]    TPro  7.5  /  Alb  3.8  /  TBili  0.7  /  DBili  x   /  AST  34  /  ALT  13  /  AlkPhos  77  [07-22-19 @ 23:18]        Troponin 0.02      [07-23-19 @ 18:20]  CK 1230      [07-24-19 @ 07:11]    Creatinine Trend:  SCr 2.43 [07-24 @ 07:11]  SCr 2.35 [07-23 @ 07:28]  SCr 2.45 [07-22 @ 23:18]    Urinalysis - [07-23-19 @ 01:07]      Color Yellow / Appearance Clear / SG 1.015 / pH 5.0      Gluc Negative / Ketone Trace  / Bili Negative / Urobili Negative       Blood Moderate / Protein 30 / Leuk Est Trace / Nitrite Negative      RBC 3-5 / WBC 3-5 / Hyaline  / Gran 0-2 / Sq Epi  / Non Sq Epi Occasional / Bacteria Few

## 2019-07-25 LAB
ANION GAP SERPL CALC-SCNC: 10 MMOL/L — SIGNIFICANT CHANGE UP (ref 5–17)
BASOPHILS # BLD AUTO: 0.04 K/UL — SIGNIFICANT CHANGE UP (ref 0–0.2)
BASOPHILS NFR BLD AUTO: 0.4 % — SIGNIFICANT CHANGE UP (ref 0–2)
BUN SERPL-MCNC: 61 MG/DL — HIGH (ref 8–20)
CALCIUM SERPL-MCNC: 8.8 MG/DL — SIGNIFICANT CHANGE UP (ref 8.6–10.2)
CHLORIDE SERPL-SCNC: 105 MMOL/L — SIGNIFICANT CHANGE UP (ref 98–107)
CK MB CFR SERPL CALC: 8 NG/ML — HIGH (ref 0–6.7)
CK SERPL-CCNC: 859 U/L — HIGH (ref 30–200)
CO2 SERPL-SCNC: 25 MMOL/L — SIGNIFICANT CHANGE UP (ref 22–29)
CREAT SERPL-MCNC: 2.07 MG/DL — HIGH (ref 0.5–1.3)
EOSINOPHIL # BLD AUTO: 0.18 K/UL — SIGNIFICANT CHANGE UP (ref 0–0.5)
EOSINOPHIL NFR BLD AUTO: 1.6 % — SIGNIFICANT CHANGE UP (ref 0–6)
GLUCOSE SERPL-MCNC: 120 MG/DL — HIGH (ref 70–115)
HCT VFR BLD CALC: 38.3 % — LOW (ref 39–50)
HGB BLD-MCNC: 11.5 G/DL — LOW (ref 13–17)
IMM GRANULOCYTES NFR BLD AUTO: 0.4 % — SIGNIFICANT CHANGE UP (ref 0–1.5)
LEGIONELLA AG UR QL: NEGATIVE — SIGNIFICANT CHANGE UP
LYMPHOCYTES # BLD AUTO: 0.83 K/UL — LOW (ref 1–3.3)
LYMPHOCYTES # BLD AUTO: 7.4 % — LOW (ref 13–44)
MAGNESIUM SERPL-MCNC: 2.2 MG/DL — SIGNIFICANT CHANGE UP (ref 1.6–2.6)
MCHC RBC-ENTMCNC: 30 GM/DL — LOW (ref 32–36)
MCHC RBC-ENTMCNC: 30.6 PG — SIGNIFICANT CHANGE UP (ref 27–34)
MCV RBC AUTO: 101.9 FL — HIGH (ref 80–100)
MONOCYTES # BLD AUTO: 0.92 K/UL — HIGH (ref 0–0.9)
MONOCYTES NFR BLD AUTO: 8.2 % — SIGNIFICANT CHANGE UP (ref 2–14)
NEUTROPHILS # BLD AUTO: 9.23 K/UL — HIGH (ref 1.8–7.4)
NEUTROPHILS NFR BLD AUTO: 82 % — HIGH (ref 43–77)
PHOSPHATE SERPL-MCNC: 3 MG/DL — SIGNIFICANT CHANGE UP (ref 2.4–4.7)
PLATELET # BLD AUTO: 243 K/UL — SIGNIFICANT CHANGE UP (ref 150–400)
POTASSIUM SERPL-MCNC: 4.5 MMOL/L — SIGNIFICANT CHANGE UP (ref 3.5–5.3)
POTASSIUM SERPL-SCNC: 4.5 MMOL/L — SIGNIFICANT CHANGE UP (ref 3.5–5.3)
PROCALCITONIN SERPL-MCNC: 11.03 NG/ML — HIGH (ref 0.02–0.1)
RBC # BLD: 3.76 M/UL — LOW (ref 4.2–5.8)
RBC # FLD: 13.6 % — SIGNIFICANT CHANGE UP (ref 10.3–14.5)
SODIUM SERPL-SCNC: 140 MMOL/L — SIGNIFICANT CHANGE UP (ref 135–145)
WBC # BLD: 11.25 K/UL — HIGH (ref 3.8–10.5)
WBC # FLD AUTO: 11.25 K/UL — HIGH (ref 3.8–10.5)

## 2019-07-25 PROCEDURE — 99232 SBSQ HOSP IP/OBS MODERATE 35: CPT

## 2019-07-25 RX ORDER — SODIUM CHLORIDE 9 MG/ML
1000 INJECTION INTRAMUSCULAR; INTRAVENOUS; SUBCUTANEOUS
Refills: 0 | Status: DISCONTINUED | OUTPATIENT
Start: 2019-07-25 | End: 2019-07-28

## 2019-07-25 RX ADMIN — CARVEDILOL PHOSPHATE 12.5 MILLIGRAM(S): 80 CAPSULE, EXTENDED RELEASE ORAL at 18:48

## 2019-07-25 RX ADMIN — SODIUM CHLORIDE 75 MILLILITER(S): 9 INJECTION INTRAMUSCULAR; INTRAVENOUS; SUBCUTANEOUS at 18:49

## 2019-07-25 RX ADMIN — Medication 325 MILLIGRAM(S): at 12:33

## 2019-07-25 RX ADMIN — Medication 600 MILLIGRAM(S): at 18:48

## 2019-07-25 RX ADMIN — Medication 3 MILLILITER(S): at 04:44

## 2019-07-25 RX ADMIN — Medication 250 MILLIGRAM(S): at 05:33

## 2019-07-25 RX ADMIN — Medication 100 MILLIGRAM(S): at 12:33

## 2019-07-25 RX ADMIN — Medication 3 MILLILITER(S): at 10:01

## 2019-07-25 RX ADMIN — AZITHROMYCIN 255 MILLIGRAM(S): 500 TABLET, FILM COATED ORAL at 06:23

## 2019-07-25 RX ADMIN — HEPARIN SODIUM 5000 UNIT(S): 5000 INJECTION INTRAVENOUS; SUBCUTANEOUS at 06:23

## 2019-07-25 RX ADMIN — Medication 3 MILLILITER(S): at 15:12

## 2019-07-25 RX ADMIN — HEPARIN SODIUM 5000 UNIT(S): 5000 INJECTION INTRAVENOUS; SUBCUTANEOUS at 12:33

## 2019-07-25 RX ADMIN — ISOSORBIDE MONONITRATE 30 MILLIGRAM(S): 60 TABLET, EXTENDED RELEASE ORAL at 12:33

## 2019-07-25 RX ADMIN — Medication 50 MICROGRAM(S): at 05:33

## 2019-07-25 RX ADMIN — Medication 250 MILLIGRAM(S): at 18:48

## 2019-07-25 RX ADMIN — CARVEDILOL PHOSPHATE 12.5 MILLIGRAM(S): 80 CAPSULE, EXTENDED RELEASE ORAL at 05:32

## 2019-07-25 RX ADMIN — CEFTRIAXONE 100 MILLIGRAM(S): 500 INJECTION, POWDER, FOR SOLUTION INTRAMUSCULAR; INTRAVENOUS at 05:32

## 2019-07-25 RX ADMIN — Medication 600 MILLIGRAM(S): at 05:32

## 2019-07-25 RX ADMIN — HEPARIN SODIUM 5000 UNIT(S): 5000 INJECTION INTRAVENOUS; SUBCUTANEOUS at 21:55

## 2019-07-25 NOTE — PROGRESS NOTE ADULT - SUBJECTIVE AND OBJECTIVE BOX
CC: resp failure, tristen    Patient seen and examined at the bedside. No acute overnight events. no events yesterday. Complaining of sob today. Denies fever/chills, headache, lightheadedness, dizziness, cough, chest pain, palpitations, abd pain, nausea/vomiting/diarrhea, muscle pain.      =========================================================================================    PHYSICAL EXAM.    GEN - appears younger than age. obese. pleasant. no distress.   HEENT - NCAT, EOMI, RADHA  RESP - occ scattered rhonchi, much improved from prior exam. no wheeze/stridor. on supplemental O2, nc. able to speak in full sentences without distress.   CARDIO - NS1S2, RRR. No murmurs/rubs/gallops.  ABD - Soft/Non tender/Non distended. Normal BS x4 quadrants. no guarding/rebound tenderness.  Ext - BL DEVANG, +3 non pitting.  MSK - BL 5/5 strength on upper and lower extremities.   Neuro - AAOx3. cn 2-12 grossly intact  Psych - normal affect  Skin - BL hypertrocphic skin changes LE sec to chronic lymphedema, no stasis ulcers noted      VITAL SIGNS.    Vital Signs Last 24 Hrs  T(C): 36.9 (25 Jul 2019 16:04), Max: 37.1 (25 Jul 2019 08:18)  T(F): 98.5 (25 Jul 2019 16:04), Max: 98.8 (25 Jul 2019 08:18)  HR: 76 (25 Jul 2019 16:04) (61 - 88)  BP: 100/64 (25 Jul 2019 16:04) (100/64 - 136/64)  BP(mean): --  RR: 18 (25 Jul 2019 16:04) (18 - 18)  SpO2: 95% (25 Jul 2019 16:04) (95% - 98%)        =================================================    LABS.                          11.5   11.25 )-----------( 243      ( 25 Jul 2019 09:55 )             38.3     07-25    140  |  105  |  61.0<H>  ----------------------------<  120<H>  4.5   |  25.0  |  2.07<H>    Ca    8.8      25 Jul 2019 09:55  Phos  3.0     07-25  Mg     2.2     07-25          I&O's Summary    24 Jul 2019 07:01  -  25 Jul 2019 07:00  --------------------------------------------------------  IN: 675 mL / OUT: 0 mL / NET: 675 mL    25 Jul 2019 07:01  -  25 Jul 2019 16:07  --------------------------------------------------------  IN: 375 mL / OUT: 501 mL / NET: -126 mL        Culture - Blood (collected 23 Jul 2019 07:30)  Source: .Blood  Preliminary Report (25 Jul 2019 09:00):    No growth at 48 hours    Culture - Blood (collected 23 Jul 2019 07:27)  Source: .Blood  Preliminary Report (25 Jul 2019 09:00):    No growth at 48 hours        ================================================    IMAGING.      ================================================    HOME MEDS.    Home Medications:  carvedilol 12.5 mg oral tablet: 1 tab(s) orally 2 times a day (23 Jul 2019 02:47)  isosorbide dinitrate 30 mg oral tablet: orally once a day (23 Jul 2019 02:47)      ================================================    HOSPITAL MEDS.    MEDICATIONS  (STANDING):  ALBUTerol/ipratropium for Nebulization 3 milliLiter(s) Nebulizer every 6 hours  aspirin enteric coated 325 milliGRAM(s) Oral daily  carvedilol 12.5 milliGRAM(s) Oral every 12 hours  cefTRIAXone   IVPB 1000 milliGRAM(s) IV Intermittent every 24 hours  guaiFENesin  milliGRAM(s) Oral every 12 hours  heparin  Injectable 5000 Unit(s) SubCutaneous every 8 hours  isosorbide   mononitrate ER Tablet (IMDUR) 30 milliGRAM(s) Oral daily  levothyroxine 50 MICROGram(s) Oral daily  saccharomyces boulardii 250 milliGRAM(s) Oral two times a day  sodium chloride 0.9%. 1000 milliLiter(s) (75 mL/Hr) IV Continuous <Continuous>    MEDICATIONS  (PRN):  ALBUTerol    0.083% 2.5 milliGRAM(s) Nebulizer every 4 hours PRN Shortness of Breath and/or Wheezing  benzonatate 100 milliGRAM(s) Oral three times a day PRN Cough

## 2019-07-26 LAB
ANION GAP SERPL CALC-SCNC: 10 MMOL/L — SIGNIFICANT CHANGE UP (ref 5–17)
BASOPHILS # BLD AUTO: 0.04 K/UL — SIGNIFICANT CHANGE UP (ref 0–0.2)
BASOPHILS NFR BLD AUTO: 0.5 % — SIGNIFICANT CHANGE UP (ref 0–2)
BUN SERPL-MCNC: 51 MG/DL — HIGH (ref 8–20)
CALCIUM SERPL-MCNC: 9 MG/DL — SIGNIFICANT CHANGE UP (ref 8.6–10.2)
CHLORIDE SERPL-SCNC: 106 MMOL/L — SIGNIFICANT CHANGE UP (ref 98–107)
CK MB CFR SERPL CALC: 4.4 NG/ML — SIGNIFICANT CHANGE UP (ref 0–6.7)
CK SERPL-CCNC: 469 U/L — HIGH (ref 30–200)
CO2 SERPL-SCNC: 22 MMOL/L — SIGNIFICANT CHANGE UP (ref 22–29)
CREAT SERPL-MCNC: 1.82 MG/DL — HIGH (ref 0.5–1.3)
EOSINOPHIL # BLD AUTO: 0.17 K/UL — SIGNIFICANT CHANGE UP (ref 0–0.5)
EOSINOPHIL NFR BLD AUTO: 2.2 % — SIGNIFICANT CHANGE UP (ref 0–6)
GLUCOSE SERPL-MCNC: 118 MG/DL — HIGH (ref 70–115)
HCT VFR BLD CALC: 39.9 % — SIGNIFICANT CHANGE UP (ref 39–50)
HGB BLD-MCNC: 11.9 G/DL — LOW (ref 13–17)
IMM GRANULOCYTES NFR BLD AUTO: 1 % — SIGNIFICANT CHANGE UP (ref 0–1.5)
LYMPHOCYTES # BLD AUTO: 0.74 K/UL — LOW (ref 1–3.3)
LYMPHOCYTES # BLD AUTO: 9.6 % — LOW (ref 13–44)
MAGNESIUM SERPL-MCNC: 2.3 MG/DL — SIGNIFICANT CHANGE UP (ref 1.6–2.6)
MCHC RBC-ENTMCNC: 29.8 GM/DL — LOW (ref 32–36)
MCHC RBC-ENTMCNC: 30.5 PG — SIGNIFICANT CHANGE UP (ref 27–34)
MCV RBC AUTO: 102.3 FL — HIGH (ref 80–100)
MONOCYTES # BLD AUTO: 0.95 K/UL — HIGH (ref 0–0.9)
MONOCYTES NFR BLD AUTO: 12.3 % — SIGNIFICANT CHANGE UP (ref 2–14)
NEUTROPHILS # BLD AUTO: 5.73 K/UL — SIGNIFICANT CHANGE UP (ref 1.8–7.4)
NEUTROPHILS NFR BLD AUTO: 74.4 % — SIGNIFICANT CHANGE UP (ref 43–77)
PHOSPHATE SERPL-MCNC: 2.8 MG/DL — SIGNIFICANT CHANGE UP (ref 2.4–4.7)
PLATELET # BLD AUTO: 239 K/UL — SIGNIFICANT CHANGE UP (ref 150–400)
POTASSIUM SERPL-MCNC: 5 MMOL/L — SIGNIFICANT CHANGE UP (ref 3.5–5.3)
POTASSIUM SERPL-SCNC: 5 MMOL/L — SIGNIFICANT CHANGE UP (ref 3.5–5.3)
RBC # BLD: 3.9 M/UL — LOW (ref 4.2–5.8)
RBC # FLD: 13.5 % — SIGNIFICANT CHANGE UP (ref 10.3–14.5)
SODIUM SERPL-SCNC: 138 MMOL/L — SIGNIFICANT CHANGE UP (ref 135–145)
WBC # BLD: 7.71 K/UL — SIGNIFICANT CHANGE UP (ref 3.8–10.5)
WBC # FLD AUTO: 7.71 K/UL — SIGNIFICANT CHANGE UP (ref 3.8–10.5)

## 2019-07-26 PROCEDURE — 71045 X-RAY EXAM CHEST 1 VIEW: CPT | Mod: 26

## 2019-07-26 PROCEDURE — 99233 SBSQ HOSP IP/OBS HIGH 50: CPT

## 2019-07-26 RX ORDER — ALBUTEROL 90 UG/1
2.5 AEROSOL, METERED ORAL EVERY 4 HOURS
Refills: 0 | Status: DISCONTINUED | OUTPATIENT
Start: 2019-07-26 | End: 2019-07-31

## 2019-07-26 RX ORDER — CEFPODOXIME PROXETIL 100 MG
100 TABLET ORAL EVERY 12 HOURS
Refills: 0 | Status: COMPLETED | OUTPATIENT
Start: 2019-07-27 | End: 2019-07-29

## 2019-07-26 RX ADMIN — Medication 100 MILLIGRAM(S): at 12:41

## 2019-07-26 RX ADMIN — Medication 325 MILLIGRAM(S): at 12:41

## 2019-07-26 RX ADMIN — HEPARIN SODIUM 5000 UNIT(S): 5000 INJECTION INTRAVENOUS; SUBCUTANEOUS at 22:10

## 2019-07-26 RX ADMIN — CEFTRIAXONE 100 MILLIGRAM(S): 500 INJECTION, POWDER, FOR SOLUTION INTRAMUSCULAR; INTRAVENOUS at 06:26

## 2019-07-26 RX ADMIN — Medication 60 MILLIGRAM(S): at 15:38

## 2019-07-26 RX ADMIN — Medication 250 MILLIGRAM(S): at 17:14

## 2019-07-26 RX ADMIN — CARVEDILOL PHOSPHATE 12.5 MILLIGRAM(S): 80 CAPSULE, EXTENDED RELEASE ORAL at 06:26

## 2019-07-26 RX ADMIN — Medication 250 MILLIGRAM(S): at 06:26

## 2019-07-26 RX ADMIN — CARVEDILOL PHOSPHATE 12.5 MILLIGRAM(S): 80 CAPSULE, EXTENDED RELEASE ORAL at 17:14

## 2019-07-26 RX ADMIN — HEPARIN SODIUM 5000 UNIT(S): 5000 INJECTION INTRAVENOUS; SUBCUTANEOUS at 15:39

## 2019-07-26 RX ADMIN — ISOSORBIDE MONONITRATE 30 MILLIGRAM(S): 60 TABLET, EXTENDED RELEASE ORAL at 12:41

## 2019-07-26 RX ADMIN — Medication 3 MILLILITER(S): at 21:04

## 2019-07-26 RX ADMIN — Medication 50 MICROGRAM(S): at 06:26

## 2019-07-26 RX ADMIN — Medication 600 MILLIGRAM(S): at 17:14

## 2019-07-26 RX ADMIN — HEPARIN SODIUM 5000 UNIT(S): 5000 INJECTION INTRAVENOUS; SUBCUTANEOUS at 06:26

## 2019-07-26 RX ADMIN — Medication 3 MILLILITER(S): at 07:32

## 2019-07-26 RX ADMIN — Medication 600 MILLIGRAM(S): at 06:26

## 2019-07-26 NOTE — PROGRESS NOTE ADULT - SUBJECTIVE AND OBJECTIVE BOX
CC: resp failure, tristen    Patient seen and examined at the bedside. No acute overnight events. no events yesterday. Complaining of dizziness after trying to ambulate + new onset soft stools today. Denies fever/chills, headache, lightheadedness, dizziness, cough, chest pain, palpitations, abd pain, nausea/vomiting/diarrhea, muscle pain. admits to some mild continued sob.      =========================================================================================    PHYSICAL EXAM.    GEN - appears younger than age. obese. pleasant. no distress.   HEENT - NCAT, EOMI, RADHA  RESP - CTA BL except for scant audible wheeze, also noted on ausculation. on supplemental O2, nc. able to speak in full sentences without distress while on nc  CARDIO - NS1S2, RRR. No murmurs/rubs/gallops.  ABD - Soft/Non tender/Non distended. Normal BS x4 quadrants. no guarding/rebound tenderness.  Ext - BL DEVANG, +3 non pitting.  MSK - BL 5/5 strength on upper ext, BL foot drop  Neuro - AAOx3. cn 2-12 grossly intact  Psych - normal affect  Skin - BL hypertrophic skin changes LE sec to chronic lymphedema, no stasis ulcers noted      VITAL SIGNS.    Vital Signs Last 24 Hrs  T(C): 36.2 (26 Jul 2019 08:32), Max: 36.9 (25 Jul 2019 16:04)  T(F): 97.2 (26 Jul 2019 08:32), Max: 98.5 (25 Jul 2019 16:04)  HR: 64 (26 Jul 2019 12:38) (64 - 78)  BP: 144/62 (26 Jul 2019 12:38) (100/64 - 165/64)  BP(mean): --  RR: 20 (26 Jul 2019 12:38) (18 - 20)  SpO2: 98% (26 Jul 2019 12:38) (95% - 98%)        =================================================    LABS.                          11.5   11.25 )-----------( 243      ( 25 Jul 2019 09:55 )             38.3     07-25    140  |  105  |  61.0<H>  ----------------------------<  120<H>  4.5   |  25.0  |  2.07<H>    Ca    8.8      25 Jul 2019 09:55  Phos  3.0     07-25  Mg     2.2     07-25          I&O's Summary    25 Jul 2019 07:01  -  26 Jul 2019 07:00  --------------------------------------------------------  IN: 375 mL / OUT: 1101 mL / NET: -726 mL    26 Jul 2019 07:01  -  26 Jul 2019 13:48  --------------------------------------------------------  IN: 0 mL / OUT: 202 mL / NET: -202 mL          ================================================    IMAGING.      ================================================    HOME MEDS.    Home Medications:  carvedilol 12.5 mg oral tablet: 1 tab(s) orally 2 times a day (23 Jul 2019 02:47)  isosorbide dinitrate 30 mg oral tablet: orally once a day (23 Jul 2019 02:47)      ================================================    HOSPITAL MEDS.    MEDICATIONS  (STANDING):  ALBUTerol/ipratropium for Nebulization 3 milliLiter(s) Nebulizer every 6 hours  aspirin enteric coated 325 milliGRAM(s) Oral daily  carvedilol 12.5 milliGRAM(s) Oral every 12 hours  guaiFENesin  milliGRAM(s) Oral every 12 hours  heparin  Injectable 5000 Unit(s) SubCutaneous every 8 hours  isosorbide   mononitrate ER Tablet (IMDUR) 30 milliGRAM(s) Oral daily  levothyroxine 50 MICROGram(s) Oral daily  predniSONE   Tablet 60 milliGRAM(s) Oral daily  saccharomyces boulardii 250 milliGRAM(s) Oral two times a day  sodium chloride 0.9%. 1000 milliLiter(s) (75 mL/Hr) IV Continuous <Continuous>    MEDICATIONS  (PRN):  ALBUTerol    0.083% 2.5 milliGRAM(s) Nebulizer every 4 hours PRN Shortness of Breath and/or Wheezing  benzonatate 100 milliGRAM(s) Oral three times a day PRN Cough

## 2019-07-27 LAB
ANION GAP SERPL CALC-SCNC: 10 MMOL/L — SIGNIFICANT CHANGE UP (ref 5–17)
BASOPHILS # BLD AUTO: 0.01 K/UL — SIGNIFICANT CHANGE UP (ref 0–0.2)
BASOPHILS NFR BLD AUTO: 0.2 % — SIGNIFICANT CHANGE UP (ref 0–2)
BUN SERPL-MCNC: 47 MG/DL — HIGH (ref 8–20)
CALCIUM SERPL-MCNC: 8.4 MG/DL — LOW (ref 8.6–10.2)
CHLORIDE SERPL-SCNC: 110 MMOL/L — HIGH (ref 98–107)
CK MB CFR SERPL CALC: 3.1 NG/ML — SIGNIFICANT CHANGE UP (ref 0–6.7)
CK SERPL-CCNC: 237 U/L — HIGH (ref 30–200)
CO2 SERPL-SCNC: 21 MMOL/L — LOW (ref 22–29)
CREAT SERPL-MCNC: 1.71 MG/DL — HIGH (ref 0.5–1.3)
EOSINOPHIL # BLD AUTO: 0 K/UL — SIGNIFICANT CHANGE UP (ref 0–0.5)
EOSINOPHIL NFR BLD AUTO: 0 % — SIGNIFICANT CHANGE UP (ref 0–6)
GLUCOSE SERPL-MCNC: 144 MG/DL — HIGH (ref 70–115)
HCT VFR BLD CALC: 37.9 % — LOW (ref 39–50)
HGB BLD-MCNC: 11.6 G/DL — LOW (ref 13–17)
IMM GRANULOCYTES NFR BLD AUTO: 1.9 % — HIGH (ref 0–1.5)
LYMPHOCYTES # BLD AUTO: 0.5 K/UL — LOW (ref 1–3.3)
LYMPHOCYTES # BLD AUTO: 9.5 % — LOW (ref 13–44)
MAGNESIUM SERPL-MCNC: 2.5 MG/DL — SIGNIFICANT CHANGE UP (ref 1.6–2.6)
MCHC RBC-ENTMCNC: 30.6 GM/DL — LOW (ref 32–36)
MCHC RBC-ENTMCNC: 31.3 PG — SIGNIFICANT CHANGE UP (ref 27–34)
MCV RBC AUTO: 102.2 FL — HIGH (ref 80–100)
MONOCYTES # BLD AUTO: 0.22 K/UL — SIGNIFICANT CHANGE UP (ref 0–0.9)
MONOCYTES NFR BLD AUTO: 4.2 % — SIGNIFICANT CHANGE UP (ref 2–14)
NEUTROPHILS # BLD AUTO: 4.45 K/UL — SIGNIFICANT CHANGE UP (ref 1.8–7.4)
NEUTROPHILS NFR BLD AUTO: 84.2 % — HIGH (ref 43–77)
PHOSPHATE SERPL-MCNC: 3.4 MG/DL — SIGNIFICANT CHANGE UP (ref 2.4–4.7)
PLATELET # BLD AUTO: 215 K/UL — SIGNIFICANT CHANGE UP (ref 150–400)
POTASSIUM SERPL-MCNC: 5 MMOL/L — SIGNIFICANT CHANGE UP (ref 3.5–5.3)
POTASSIUM SERPL-SCNC: 5 MMOL/L — SIGNIFICANT CHANGE UP (ref 3.5–5.3)
PROCALCITONIN SERPL-MCNC: 3.26 NG/ML — HIGH (ref 0.02–0.1)
RBC # BLD: 3.71 M/UL — LOW (ref 4.2–5.8)
RBC # FLD: 13.3 % — SIGNIFICANT CHANGE UP (ref 10.3–14.5)
SODIUM SERPL-SCNC: 141 MMOL/L — SIGNIFICANT CHANGE UP (ref 135–145)
WBC # BLD: 5.28 K/UL — SIGNIFICANT CHANGE UP (ref 3.8–10.5)
WBC # FLD AUTO: 5.28 K/UL — SIGNIFICANT CHANGE UP (ref 3.8–10.5)

## 2019-07-27 PROCEDURE — 99233 SBSQ HOSP IP/OBS HIGH 50: CPT

## 2019-07-27 RX ORDER — HEPARIN SODIUM 5000 [USP'U]/ML
5000 INJECTION INTRAVENOUS; SUBCUTANEOUS EVERY 12 HOURS
Refills: 0 | Status: DISCONTINUED | OUTPATIENT
Start: 2019-07-27 | End: 2019-07-31

## 2019-07-27 RX ADMIN — Medication 100 MILLIGRAM(S): at 18:42

## 2019-07-27 RX ADMIN — Medication 600 MILLIGRAM(S): at 18:42

## 2019-07-27 RX ADMIN — CARVEDILOL PHOSPHATE 12.5 MILLIGRAM(S): 80 CAPSULE, EXTENDED RELEASE ORAL at 06:02

## 2019-07-27 RX ADMIN — Medication 60 MILLIGRAM(S): at 06:02

## 2019-07-27 RX ADMIN — HEPARIN SODIUM 5000 UNIT(S): 5000 INJECTION INTRAVENOUS; SUBCUTANEOUS at 14:45

## 2019-07-27 RX ADMIN — Medication 100 MILLIGRAM(S): at 06:02

## 2019-07-27 RX ADMIN — Medication 250 MILLIGRAM(S): at 18:42

## 2019-07-27 RX ADMIN — CARVEDILOL PHOSPHATE 12.5 MILLIGRAM(S): 80 CAPSULE, EXTENDED RELEASE ORAL at 18:43

## 2019-07-27 RX ADMIN — HEPARIN SODIUM 5000 UNIT(S): 5000 INJECTION INTRAVENOUS; SUBCUTANEOUS at 06:02

## 2019-07-27 RX ADMIN — Medication 600 MILLIGRAM(S): at 06:02

## 2019-07-27 RX ADMIN — Medication 250 MILLIGRAM(S): at 06:02

## 2019-07-27 RX ADMIN — Medication 50 MICROGRAM(S): at 06:02

## 2019-07-27 RX ADMIN — ISOSORBIDE MONONITRATE 30 MILLIGRAM(S): 60 TABLET, EXTENDED RELEASE ORAL at 12:23

## 2019-07-27 RX ADMIN — Medication 325 MILLIGRAM(S): at 12:23

## 2019-07-27 NOTE — PROGRESS NOTE ADULT - SUBJECTIVE AND OBJECTIVE BOX
CHIEF COMPLAINT/INTERVAL HISTORY:    Patient is a 89y old  Male who presents with a chief complaint of fall, rhabdo, MATILDE (26 Jul 2019 13:41)      HPI:  89yoM hx HTN, HFpEF, hypothyroidism, chronic lymphedema, presenting with unwitnessed fall at home.  Pt lives alone independently but has a visiting physician assistant and RN who checks on him every month.  He ambulates with a walker and reports that yesterday while in the bathroom, he legs felt weak and he fell backwards with impact to the head.  Pt remembers the fall, did not lose consciousness and denies any lightheadedness, chest pain, dyspnea, palpitations preceding the fall.  Pt laid on his bathroom floor for several hours before crawling to phone in different room where he was able to call EMS.  Reports that lymphedema has been chronic for several years but has been relatively stable recently.  Pt reports a non-productive cough for few days, but denies any fevers, chills, dyspnea, abdominal pain, nausea, vomiting, diarrhea. On admission, labs notable for WBC 17, MATILDE, CK 1000.  CT head and maxillofacial negative for acute pathology. (23 Jul 2019 01:20)      SUBJECTIVE & OBJECTIVE: Pt seen and examined at bedside.     ICU Vital Signs Last 24 Hrs  T(C): 36.8 (27 Jul 2019 16:10), Max: 36.8 (27 Jul 2019 16:10)  T(F): 98.2 (27 Jul 2019 16:10), Max: 98.2 (27 Jul 2019 16:10)  HR: 70 (27 Jul 2019 18:45) (65 - 79)  BP: 160/60 (27 Jul 2019 18:45) (147/57 - 160/60)  BP(mean): --  ABP: --  ABP(mean): --  RR: 20 (27 Jul 2019 16:10) (20 - 20)  SpO2: 95% (27 Jul 2019 16:10) (95% - 97%)        MEDICATIONS  (STANDING):  aspirin enteric coated 325 milliGRAM(s) Oral daily  carvedilol 12.5 milliGRAM(s) Oral every 12 hours  cefpodoxime 100 milliGRAM(s) Oral every 12 hours  guaiFENesin  milliGRAM(s) Oral every 12 hours  heparin  Injectable 5000 Unit(s) SubCutaneous every 12 hours  isosorbide   mononitrate ER Tablet (IMDUR) 30 milliGRAM(s) Oral daily  levothyroxine 50 MICROGram(s) Oral daily  predniSONE   Tablet 60 milliGRAM(s) Oral daily  saccharomyces boulardii 250 milliGRAM(s) Oral two times a day  sodium chloride 0.9%. 1000 milliLiter(s) (75 mL/Hr) IV Continuous <Continuous>  torsemide 20 milliGRAM(s) Oral daily    MEDICATIONS  (PRN):  ALBUTerol    0.083% 2.5 milliGRAM(s) Nebulizer every 4 hours PRN Shortness of Breath and/or Wheezing  benzonatate 100 milliGRAM(s) Oral three times a day PRN Cough      LABS:                        11.6   5.28  )-----------( 215      ( 27 Jul 2019 05:54 )             37.9     07-27    141  |  110<H>  |  47.0<H>  ----------------------------<  144<H>  5.0   |  21.0<L>  |  1.71<H>    Ca    8.4<L>      27 Jul 2019 05:54  Phos  3.4     07-27  Mg     2.5     07-27            CAPILLARY BLOOD GLUCOSE          RECENT CULTURES:      RADIOLOGY & ADDITIONAL TESTS:      PHYSICAL EXAM:    GENERAL: NAD, well-groomed, well-developed  HEAD:  Atraumatic, Normocephalic  EYES: EOMI, PERRLA, conjunctiva and sclera clear  ENMT: Moist mucous membranes  NECK: Supple, No JVD  NERVOUS SYSTEM:  Alert & Oriented X3, Motor Strength 5/5 B/L upper and lower extremities; DTRs 2+ intact and symmetric  CHEST/LUNG: Clear to auscultation bilaterally; No rales, rhonchi, wheezing, or rubs  HEART: Regular rate and rhythm; No murmurs, rubs, or gallops  ABDOMEN: Soft, Nontender, Nondistended; Bowel sounds present  EXTREMITIES:  2+ Peripheral Pulses, No clubbing, cyanosis, or edema        DVT/GI ppx  Discussed with pt @ bedside CHIEF COMPLAINT/INTERVAL HISTORY:    Patient is a 89y old  Male who presents with a chief complaint of fall, rhabdo, MATILDE (26 Jul 2019 13:41)    SUBJECTIVE & OBJECTIVE: Pt seen and examined at bedside. No overnight events. Patient reports feeling well, sitting OOB to chair. LE edema noted; patient reports it is chronic. Duplex ordered. Awaiting HUMBERTO.    ROS: No chest pain, palpitations, SOB, light headedness, dizziness, headache, nausea/vomiting, fevers/chills, abdominal pain, dysuria or increased urinary frequency.    ICU Vital Signs Last 24 Hrs  T(C): 36.8 (27 Jul 2019 16:10), Max: 36.8 (27 Jul 2019 16:10)  T(F): 98.2 (27 Jul 2019 16:10), Max: 98.2 (27 Jul 2019 16:10)  HR: 70 (27 Jul 2019 18:45) (65 - 79)  BP: 160/60 (27 Jul 2019 18:45) (147/57 - 160/60)  RR: 20 (27 Jul 2019 16:10) (20 - 20)  SpO2: 95% (27 Jul 2019 16:10) (95% - 97%)    MEDICATIONS  (STANDING):  aspirin enteric coated 325 milliGRAM(s) Oral daily  carvedilol 12.5 milliGRAM(s) Oral every 12 hours  cefpodoxime 100 milliGRAM(s) Oral every 12 hours  guaiFENesin  milliGRAM(s) Oral every 12 hours  heparin  Injectable 5000 Unit(s) SubCutaneous every 12 hours  isosorbide   mononitrate ER Tablet (IMDUR) 30 milliGRAM(s) Oral daily  levothyroxine 50 MICROGram(s) Oral daily  predniSONE   Tablet 60 milliGRAM(s) Oral daily  saccharomyces boulardii 250 milliGRAM(s) Oral two times a day  sodium chloride 0.9%. 1000 milliLiter(s) (75 mL/Hr) IV Continuous <Continuous>  torsemide 20 milliGRAM(s) Oral daily    MEDICATIONS  (PRN):  ALBUTerol    0.083% 2.5 milliGRAM(s) Nebulizer every 4 hours PRN Shortness of Breath and/or Wheezing  benzonatate 100 milliGRAM(s) Oral three times a day PRN Cough      LABS:                        11.6   5.28  )-----------( 215      ( 27 Jul 2019 05:54 )             37.9     07-27    141  |  110<H>  |  47.0<H>  ----------------------------<  144<H>  5.0   |  21.0<L>  |  1.71<H>    Ca    8.4<L>      27 Jul 2019 05:54  Phos  3.4     07-27  Mg     2.5     07-27      PHYSICAL EXAM:    GENERAL: elderly male, sitting in bed, NAD  HEAD:  Atraumatic, Normocephalic  EYES: EOMI, PERRLA, conjunctiva and sclera clear  ENMT: Moist mucous membranes  NECK: Supple   NERVOUS SYSTEM:  Alert & Oriented X3   CHEST/LUNG: bibasilar crackles  HEART: Regular rate and rhythm; + S1/S2, + Murmur  ABDOMEN: Soft, Nontender, Obese; Bowel sounds present  EXTREMITIES:  +++ LE edema

## 2019-07-28 LAB
ANION GAP SERPL CALC-SCNC: 11 MMOL/L — SIGNIFICANT CHANGE UP (ref 5–17)
ANION GAP SERPL CALC-SCNC: 15 MMOL/L — SIGNIFICANT CHANGE UP (ref 5–17)
BASOPHILS # BLD AUTO: 0.09 K/UL — SIGNIFICANT CHANGE UP (ref 0–0.2)
BASOPHILS NFR BLD AUTO: 0.9 % — SIGNIFICANT CHANGE UP (ref 0–2)
BUN SERPL-MCNC: 58 MG/DL — HIGH (ref 8–20)
BUN SERPL-MCNC: 60 MG/DL — HIGH (ref 8–20)
BURR CELLS BLD QL SMEAR: PRESENT — SIGNIFICANT CHANGE UP
CALCIUM SERPL-MCNC: 8.9 MG/DL — SIGNIFICANT CHANGE UP (ref 8.6–10.2)
CALCIUM SERPL-MCNC: 9.1 MG/DL — SIGNIFICANT CHANGE UP (ref 8.6–10.2)
CHLORIDE SERPL-SCNC: 106 MMOL/L — SIGNIFICANT CHANGE UP (ref 98–107)
CHLORIDE SERPL-SCNC: 108 MMOL/L — HIGH (ref 98–107)
CO2 SERPL-SCNC: 20 MMOL/L — LOW (ref 22–29)
CO2 SERPL-SCNC: 25 MMOL/L — SIGNIFICANT CHANGE UP (ref 22–29)
CREAT SERPL-MCNC: 1.72 MG/DL — HIGH (ref 0.5–1.3)
CREAT SERPL-MCNC: 1.85 MG/DL — HIGH (ref 0.5–1.3)
CULTURE RESULTS: SIGNIFICANT CHANGE UP
CULTURE RESULTS: SIGNIFICANT CHANGE UP
EOSINOPHIL # BLD AUTO: 0 K/UL — SIGNIFICANT CHANGE UP (ref 0–0.5)
EOSINOPHIL NFR BLD AUTO: 0 % — SIGNIFICANT CHANGE UP (ref 0–6)
GLUCOSE SERPL-MCNC: 114 MG/DL — SIGNIFICANT CHANGE UP (ref 70–115)
GLUCOSE SERPL-MCNC: 133 MG/DL — HIGH (ref 70–115)
HCT VFR BLD CALC: 41.1 % — SIGNIFICANT CHANGE UP (ref 39–50)
HGB BLD-MCNC: 12.4 G/DL — LOW (ref 13–17)
HYPOCHROMIA BLD QL: SLIGHT — SIGNIFICANT CHANGE UP
LYMPHOCYTES # BLD AUTO: 0.27 K/UL — LOW (ref 1–3.3)
LYMPHOCYTES # BLD AUTO: 2.6 % — LOW (ref 13–44)
MACROCYTES BLD QL: SLIGHT — SIGNIFICANT CHANGE UP
MAGNESIUM SERPL-MCNC: 2.6 MG/DL — SIGNIFICANT CHANGE UP (ref 1.6–2.6)
MANUAL SMEAR VERIFICATION: SIGNIFICANT CHANGE UP
MCHC RBC-ENTMCNC: 30.2 GM/DL — LOW (ref 32–36)
MCHC RBC-ENTMCNC: 30.5 PG — SIGNIFICANT CHANGE UP (ref 27–34)
MCV RBC AUTO: 101.2 FL — HIGH (ref 80–100)
METAMYELOCYTES # FLD: 0.9 % — HIGH (ref 0–0)
MONOCYTES # BLD AUTO: 0.44 K/UL — SIGNIFICANT CHANGE UP (ref 0–0.9)
MONOCYTES NFR BLD AUTO: 4.3 % — SIGNIFICANT CHANGE UP (ref 2–14)
NEUTROPHILS # BLD AUTO: 9.17 K/UL — HIGH (ref 1.8–7.4)
NEUTROPHILS NFR BLD AUTO: 88.7 % — HIGH (ref 43–77)
OVALOCYTES BLD QL SMEAR: SLIGHT — SIGNIFICANT CHANGE UP
PHOSPHATE SERPL-MCNC: 3.7 MG/DL — SIGNIFICANT CHANGE UP (ref 2.4–4.7)
PLAT MORPH BLD: NORMAL — SIGNIFICANT CHANGE UP
PLATELET # BLD AUTO: 295 K/UL — SIGNIFICANT CHANGE UP (ref 150–400)
POIKILOCYTOSIS BLD QL AUTO: SIGNIFICANT CHANGE UP
POLYCHROMASIA BLD QL SMEAR: SIGNIFICANT CHANGE UP
POTASSIUM SERPL-MCNC: 5.2 MMOL/L — SIGNIFICANT CHANGE UP (ref 3.5–5.3)
POTASSIUM SERPL-MCNC: 5.4 MMOL/L — HIGH (ref 3.5–5.3)
POTASSIUM SERPL-SCNC: 5.2 MMOL/L — SIGNIFICANT CHANGE UP (ref 3.5–5.3)
POTASSIUM SERPL-SCNC: 5.4 MMOL/L — HIGH (ref 3.5–5.3)
PROMYELOCYTES # FLD: 2.6 % — HIGH (ref 0–0)
RBC # BLD: 4.06 M/UL — LOW (ref 4.2–5.8)
RBC # FLD: 13.3 % — SIGNIFICANT CHANGE UP (ref 10.3–14.5)
RBC BLD AUTO: ABNORMAL
SODIUM SERPL-SCNC: 141 MMOL/L — SIGNIFICANT CHANGE UP (ref 135–145)
SODIUM SERPL-SCNC: 144 MMOL/L — SIGNIFICANT CHANGE UP (ref 135–145)
SPECIMEN SOURCE: SIGNIFICANT CHANGE UP
SPECIMEN SOURCE: SIGNIFICANT CHANGE UP
WBC # BLD: 10.34 K/UL — SIGNIFICANT CHANGE UP (ref 3.8–10.5)
WBC # FLD AUTO: 10.34 K/UL — SIGNIFICANT CHANGE UP (ref 3.8–10.5)

## 2019-07-28 PROCEDURE — 93970 EXTREMITY STUDY: CPT | Mod: 26

## 2019-07-28 PROCEDURE — 99233 SBSQ HOSP IP/OBS HIGH 50: CPT

## 2019-07-28 RX ORDER — HYDRALAZINE HCL 50 MG
25 TABLET ORAL
Refills: 0 | Status: DISCONTINUED | OUTPATIENT
Start: 2019-07-28 | End: 2019-07-31

## 2019-07-28 RX ADMIN — Medication 600 MILLIGRAM(S): at 17:56

## 2019-07-28 RX ADMIN — Medication 325 MILLIGRAM(S): at 11:55

## 2019-07-28 RX ADMIN — Medication 100 MILLIGRAM(S): at 05:26

## 2019-07-28 RX ADMIN — ISOSORBIDE MONONITRATE 30 MILLIGRAM(S): 60 TABLET, EXTENDED RELEASE ORAL at 11:53

## 2019-07-28 RX ADMIN — HEPARIN SODIUM 5000 UNIT(S): 5000 INJECTION INTRAVENOUS; SUBCUTANEOUS at 05:26

## 2019-07-28 RX ADMIN — CARVEDILOL PHOSPHATE 12.5 MILLIGRAM(S): 80 CAPSULE, EXTENDED RELEASE ORAL at 05:26

## 2019-07-28 RX ADMIN — Medication 20 MILLIGRAM(S): at 05:26

## 2019-07-28 RX ADMIN — Medication 600 MILLIGRAM(S): at 05:26

## 2019-07-28 RX ADMIN — Medication 250 MILLIGRAM(S): at 17:56

## 2019-07-28 RX ADMIN — Medication 50 MICROGRAM(S): at 05:26

## 2019-07-28 RX ADMIN — CARVEDILOL PHOSPHATE 12.5 MILLIGRAM(S): 80 CAPSULE, EXTENDED RELEASE ORAL at 17:58

## 2019-07-28 RX ADMIN — HEPARIN SODIUM 5000 UNIT(S): 5000 INJECTION INTRAVENOUS; SUBCUTANEOUS at 17:57

## 2019-07-28 RX ADMIN — Medication 25 MILLIGRAM(S): at 08:42

## 2019-07-28 RX ADMIN — Medication 60 MILLIGRAM(S): at 05:26

## 2019-07-28 RX ADMIN — Medication 100 MILLIGRAM(S): at 17:56

## 2019-07-28 RX ADMIN — Medication 25 MILLIGRAM(S): at 22:09

## 2019-07-28 RX ADMIN — Medication 250 MILLIGRAM(S): at 05:26

## 2019-07-28 NOTE — DIETITIAN INITIAL EVALUATION ADULT. - OTHER INFO
Patient is a 89 year old male with PMH of HTN, HFpEF, hypothyroidism, and chronic lymphedema presented s/p mechanical fall and found to have MATILDE secondary to rhabdo. ED course complicated by development of acute respiratory failure due to CHF exacerbation and pneumonia.      Spoke with Pt at bedside. Pt reports great appetite/intake 100% PO "never misses a meal", weight has been stable. Noted 4+ BLLE edema, on diuretic and fluid restriction. Pt not interested in diet education.

## 2019-07-28 NOTE — PROGRESS NOTE ADULT - SUBJECTIVE AND OBJECTIVE BOX
CHIEF COMPLAINT/INTERVAL HISTORY:    Patient is a 89y old  Male who presents with a chief complaint of fall, rhabdo, MATILDE (28 Jul 2019 19:15)    SUBJECTIVE & OBJECTIVE: Pt seen and examined at bedside. No overnight events. Reports diarrhea; semiformed therefore can not test for C.diff. Pulse ox 92% on room air. Awaiting placement.    ICU Vital Signs Last 24 Hrs  T(C): 36.4 (28 Jul 2019 23:20), Max: 36.5 (28 Jul 2019 15:58)  T(F): 97.6 (28 Jul 2019 23:20), Max: 97.7 (28 Jul 2019 15:58)  HR: 61 (29 Jul 2019 06:44) (59 - 76)  BP: 142/58 (29 Jul 2019 06:44) (134/53 - 170/68)  RR: 18 (28 Jul 2019 23:20) (18 - 18)  SpO2: 96% (28 Jul 2019 23:20) (93% - 97%)    MEDICATIONS  (STANDING):  aspirin enteric coated 325 milliGRAM(s) Oral daily  carvedilol 12.5 milliGRAM(s) Oral every 12 hours  cefpodoxime 100 milliGRAM(s) Oral every 12 hours  guaiFENesin  milliGRAM(s) Oral every 12 hours  heparin  Injectable 5000 Unit(s) SubCutaneous every 12 hours  hydrALAZINE 25 milliGRAM(s) Oral two times a day  isosorbide   mononitrate ER Tablet (IMDUR) 30 milliGRAM(s) Oral daily  levothyroxine 50 MICROGram(s) Oral daily  predniSONE   Tablet 60 milliGRAM(s) Oral daily  saccharomyces boulardii 250 milliGRAM(s) Oral two times a day    MEDICATIONS  (PRN):  ALBUTerol    0.083% 2.5 milliGRAM(s) Nebulizer every 4 hours PRN Shortness of Breath and/or Wheezing  benzonatate 100 milliGRAM(s) Oral three times a day PRN Cough      LABS:                        12.3   8.82  )-----------( 316      ( 29 Jul 2019 07:59 )             40.9     07-29    138  |  106  |  64.0<H>  ----------------------------<  105  5.6<H>   |  26.0  |  2.02<H>    Ca    8.8      29 Jul 2019 07:59  Phos  4.0     07-29  Mg     2.6     07-29

## 2019-07-29 LAB
ANION GAP SERPL CALC-SCNC: 11 MMOL/L — SIGNIFICANT CHANGE UP (ref 5–17)
ANION GAP SERPL CALC-SCNC: 6 MMOL/L — SIGNIFICANT CHANGE UP (ref 5–17)
BASOPHILS # BLD AUTO: 0.04 K/UL — SIGNIFICANT CHANGE UP (ref 0–0.2)
BASOPHILS NFR BLD AUTO: 0.5 % — SIGNIFICANT CHANGE UP (ref 0–2)
BUN SERPL-MCNC: 64 MG/DL — HIGH (ref 8–20)
BUN SERPL-MCNC: 67 MG/DL — HIGH (ref 8–20)
CALCIUM SERPL-MCNC: 8.8 MG/DL — SIGNIFICANT CHANGE UP (ref 8.6–10.2)
CALCIUM SERPL-MCNC: 8.8 MG/DL — SIGNIFICANT CHANGE UP (ref 8.6–10.2)
CHLORIDE SERPL-SCNC: 102 MMOL/L — SIGNIFICANT CHANGE UP (ref 98–107)
CHLORIDE SERPL-SCNC: 106 MMOL/L — SIGNIFICANT CHANGE UP (ref 98–107)
CO2 SERPL-SCNC: 24 MMOL/L — SIGNIFICANT CHANGE UP (ref 22–29)
CO2 SERPL-SCNC: 26 MMOL/L — SIGNIFICANT CHANGE UP (ref 22–29)
CREAT SERPL-MCNC: 1.84 MG/DL — HIGH (ref 0.5–1.3)
CREAT SERPL-MCNC: 2.02 MG/DL — HIGH (ref 0.5–1.3)
EOSINOPHIL # BLD AUTO: 0.06 K/UL — SIGNIFICANT CHANGE UP (ref 0–0.5)
EOSINOPHIL NFR BLD AUTO: 0.7 % — SIGNIFICANT CHANGE UP (ref 0–6)
GLUCOSE SERPL-MCNC: 105 MG/DL — SIGNIFICANT CHANGE UP (ref 70–115)
GLUCOSE SERPL-MCNC: 182 MG/DL — HIGH (ref 70–115)
HCT VFR BLD CALC: 40.9 % — SIGNIFICANT CHANGE UP (ref 39–50)
HGB BLD-MCNC: 12.3 G/DL — LOW (ref 13–17)
IMM GRANULOCYTES NFR BLD AUTO: 5.8 % — HIGH (ref 0–1.5)
LYMPHOCYTES # BLD AUTO: 1.12 K/UL — SIGNIFICANT CHANGE UP (ref 1–3.3)
LYMPHOCYTES # BLD AUTO: 12.7 % — LOW (ref 13–44)
MAGNESIUM SERPL-MCNC: 2.6 MG/DL — SIGNIFICANT CHANGE UP (ref 1.8–2.6)
MCHC RBC-ENTMCNC: 30.1 GM/DL — LOW (ref 32–36)
MCHC RBC-ENTMCNC: 30.2 PG — SIGNIFICANT CHANGE UP (ref 27–34)
MCV RBC AUTO: 100.5 FL — HIGH (ref 80–100)
MONOCYTES # BLD AUTO: 1.14 K/UL — HIGH (ref 0–0.9)
MONOCYTES NFR BLD AUTO: 12.9 % — SIGNIFICANT CHANGE UP (ref 2–14)
NEUTROPHILS # BLD AUTO: 5.95 K/UL — SIGNIFICANT CHANGE UP (ref 1.8–7.4)
NEUTROPHILS NFR BLD AUTO: 67.4 % — SIGNIFICANT CHANGE UP (ref 43–77)
PHOSPHATE SERPL-MCNC: 4 MG/DL — SIGNIFICANT CHANGE UP (ref 2.4–4.7)
PLATELET # BLD AUTO: 316 K/UL — SIGNIFICANT CHANGE UP (ref 150–400)
POTASSIUM SERPL-MCNC: 4.9 MMOL/L — SIGNIFICANT CHANGE UP (ref 3.5–5.3)
POTASSIUM SERPL-MCNC: 5.6 MMOL/L — HIGH (ref 3.5–5.3)
POTASSIUM SERPL-SCNC: 4.9 MMOL/L — SIGNIFICANT CHANGE UP (ref 3.5–5.3)
POTASSIUM SERPL-SCNC: 5.6 MMOL/L — HIGH (ref 3.5–5.3)
RBC # BLD: 4.07 M/UL — LOW (ref 4.2–5.8)
RBC # FLD: 13.6 % — SIGNIFICANT CHANGE UP (ref 10.3–14.5)
SODIUM SERPL-SCNC: 137 MMOL/L — SIGNIFICANT CHANGE UP (ref 135–145)
SODIUM SERPL-SCNC: 138 MMOL/L — SIGNIFICANT CHANGE UP (ref 135–145)
WBC # BLD: 8.82 K/UL — SIGNIFICANT CHANGE UP (ref 3.8–10.5)
WBC # FLD AUTO: 8.82 K/UL — SIGNIFICANT CHANGE UP (ref 3.8–10.5)

## 2019-07-29 PROCEDURE — 99232 SBSQ HOSP IP/OBS MODERATE 35: CPT

## 2019-07-29 RX ADMIN — HEPARIN SODIUM 5000 UNIT(S): 5000 INJECTION INTRAVENOUS; SUBCUTANEOUS at 17:06

## 2019-07-29 RX ADMIN — Medication 250 MILLIGRAM(S): at 17:06

## 2019-07-29 RX ADMIN — Medication 25 MILLIGRAM(S): at 05:52

## 2019-07-29 RX ADMIN — ISOSORBIDE MONONITRATE 30 MILLIGRAM(S): 60 TABLET, EXTENDED RELEASE ORAL at 11:13

## 2019-07-29 RX ADMIN — Medication 250 MILLIGRAM(S): at 05:52

## 2019-07-29 RX ADMIN — Medication 25 MILLIGRAM(S): at 17:06

## 2019-07-29 RX ADMIN — Medication 325 MILLIGRAM(S): at 11:13

## 2019-07-29 RX ADMIN — CARVEDILOL PHOSPHATE 12.5 MILLIGRAM(S): 80 CAPSULE, EXTENDED RELEASE ORAL at 17:06

## 2019-07-29 RX ADMIN — Medication 60 MILLIGRAM(S): at 05:52

## 2019-07-29 RX ADMIN — Medication 50 MICROGRAM(S): at 05:52

## 2019-07-29 RX ADMIN — Medication 600 MILLIGRAM(S): at 05:52

## 2019-07-29 RX ADMIN — CARVEDILOL PHOSPHATE 12.5 MILLIGRAM(S): 80 CAPSULE, EXTENDED RELEASE ORAL at 05:52

## 2019-07-29 RX ADMIN — Medication 100 MILLIGRAM(S): at 05:52

## 2019-07-29 RX ADMIN — Medication 600 MILLIGRAM(S): at 17:06

## 2019-07-29 RX ADMIN — HEPARIN SODIUM 5000 UNIT(S): 5000 INJECTION INTRAVENOUS; SUBCUTANEOUS at 05:52

## 2019-07-29 RX ADMIN — Medication 100 MILLIGRAM(S): at 17:06

## 2019-07-29 NOTE — PROGRESS NOTE ADULT - SUBJECTIVE AND OBJECTIVE BOX
CC: fall, MATILDE aden (28 Jul 2019 19:15)    HPI:  89yoM hx HTN, HFpEF, hypothyroidism, chronic lymphedema, presenting with unwitnessed fall at home.      INTERVAL HPI/OVERNIGHT EVENTS: Patient seen and examined sitting up in the chair.  Patient reports feeling better.  Diarrhea slowly improving.  Patient denies any headache, dizziness, SOB, CP, abdominal pain, nausea, vomiting, dysuria.  Other ROS reviewed and are negative.    Vital Signs Last 24 Hrs  T(C): 36.4 (29 Jul 2019 08:30), Max: 36.5 (28 Jul 2019 15:58)  T(F): 97.6 (29 Jul 2019 08:30), Max: 97.7 (28 Jul 2019 15:58)  HR: 68 (29 Jul 2019 10:31) (59 - 76)  BP: 155/69 (29 Jul 2019 08:30) (134/53 - 170/68)  BP(mean): --  RR: 18 (29 Jul 2019 10:31) (18 - 18)  SpO2: 96% (29 Jul 2019 10:31) (96% - 98%)  I&O's Detail    28 Jul 2019 07:01  -  29 Jul 2019 07:00  --------------------------------------------------------  IN:  Total IN: 0 mL    OUT:    Voided: 850 mL  Total OUT: 850 mL    Total NET: -850 mL      PHYSICAL EXAM:  GENERAL: NAD  HEAD:  Atraumatic, Normocephalic  NECK: Supple, No JVD, Normal thyroid  NERVOUS SYSTEM:  Alert & Oriented X3, Good concentration; generalized weakness  CHEST/LUNG: Coarse BS bilaterally; No rales, rhonchi, wheezing, or rubs  HEART: Regular rate and rhythm; No murmurs, rubs, or gallops  ABDOMEN: Soft, Nontender, Nondistended; Bowel sounds present  EXTREMITIES:  2+ Peripheral Pulses, bilateral lymphedema                              12.3   8.82  )-----------( 316      ( 29 Jul 2019 07:59 )             40.9     29 Jul 2019 07:59    138    |  106    |  64.0   ----------------------------<  105    5.6     |  26.0   |  2.02     Ca    8.8        29 Jul 2019 07:59  Phos  4.0       29 Jul 2019 07:59  Mg     2.6       29 Jul 2019 07:59        MEDICATIONS  (STANDING):  aspirin enteric coated 325 milliGRAM(s) Oral daily  carvedilol 12.5 milliGRAM(s) Oral every 12 hours  cefpodoxime 100 milliGRAM(s) Oral every 12 hours  guaiFENesin  milliGRAM(s) Oral every 12 hours  heparin  Injectable 5000 Unit(s) SubCutaneous every 12 hours  hydrALAZINE 25 milliGRAM(s) Oral two times a day  isosorbide   mononitrate ER Tablet (IMDUR) 30 milliGRAM(s) Oral daily  levothyroxine 50 MICROGram(s) Oral daily  predniSONE   Tablet 60 milliGRAM(s) Oral daily  saccharomyces boulardii 250 milliGRAM(s) Oral two times a day    MEDICATIONS  (PRN):  ALBUTerol    0.083% 2.5 milliGRAM(s) Nebulizer every 4 hours PRN Shortness of Breath and/or Wheezing  benzonatate 100 milliGRAM(s) Oral three times a day PRN Cough

## 2019-07-30 PROCEDURE — 99232 SBSQ HOSP IP/OBS MODERATE 35: CPT

## 2019-07-30 RX ORDER — LOPERAMIDE HCL 2 MG
1 TABLET ORAL
Qty: 0 | Refills: 0 | DISCHARGE
Start: 2019-07-30

## 2019-07-30 RX ORDER — ISOSORBIDE MONONITRATE 60 MG/1
1 TABLET, EXTENDED RELEASE ORAL
Qty: 0 | Refills: 0 | DISCHARGE
Start: 2019-07-30

## 2019-07-30 RX ORDER — ISOSORBIDE DINITRATE 5 MG/1
0 TABLET ORAL
Qty: 0 | Refills: 0 | DISCHARGE

## 2019-07-30 RX ORDER — LOPERAMIDE HCL 2 MG
2 TABLET ORAL THREE TIMES A DAY
Refills: 0 | Status: DISCONTINUED | OUTPATIENT
Start: 2019-07-30 | End: 2019-07-31

## 2019-07-30 RX ORDER — HYDRALAZINE HCL 50 MG
1 TABLET ORAL
Qty: 0 | Refills: 0 | DISCHARGE
Start: 2019-07-30

## 2019-07-30 RX ADMIN — Medication 50 MICROGRAM(S): at 05:27

## 2019-07-30 RX ADMIN — Medication 250 MILLIGRAM(S): at 18:51

## 2019-07-30 RX ADMIN — Medication 25 MILLIGRAM(S): at 18:51

## 2019-07-30 RX ADMIN — ISOSORBIDE MONONITRATE 30 MILLIGRAM(S): 60 TABLET, EXTENDED RELEASE ORAL at 12:26

## 2019-07-30 RX ADMIN — Medication 25 MILLIGRAM(S): at 05:28

## 2019-07-30 RX ADMIN — Medication 250 MILLIGRAM(S): at 05:27

## 2019-07-30 RX ADMIN — CARVEDILOL PHOSPHATE 12.5 MILLIGRAM(S): 80 CAPSULE, EXTENDED RELEASE ORAL at 18:51

## 2019-07-30 RX ADMIN — Medication 60 MILLIGRAM(S): at 05:27

## 2019-07-30 RX ADMIN — HEPARIN SODIUM 5000 UNIT(S): 5000 INJECTION INTRAVENOUS; SUBCUTANEOUS at 18:51

## 2019-07-30 RX ADMIN — Medication 600 MILLIGRAM(S): at 18:51

## 2019-07-30 RX ADMIN — Medication 325 MILLIGRAM(S): at 12:26

## 2019-07-30 RX ADMIN — Medication 600 MILLIGRAM(S): at 05:29

## 2019-07-30 RX ADMIN — CARVEDILOL PHOSPHATE 12.5 MILLIGRAM(S): 80 CAPSULE, EXTENDED RELEASE ORAL at 05:28

## 2019-07-30 RX ADMIN — HEPARIN SODIUM 5000 UNIT(S): 5000 INJECTION INTRAVENOUS; SUBCUTANEOUS at 05:27

## 2019-07-30 NOTE — DISCHARGE NOTE PROVIDER - NSDCCPCAREPLAN_GEN_ALL_CORE_FT
PRINCIPAL DISCHARGE DIAGNOSIS  Diagnosis: Pneumonia  Assessment and Plan of Treatment: Completed antibiotics.  Follow up with primary doctor.      SECONDARY DISCHARGE DIAGNOSES  Diagnosis: MATILDE (acute kidney injury)  Assessment and Plan of Treatment: Follow up with primary doctor.  Resume diuretics for CHF when Cr improves.    Diagnosis: Fall, initial encounter  Assessment and Plan of Treatment: Rehab

## 2019-07-30 NOTE — PROGRESS NOTE ADULT - NSHPATTENDINGPLANDISCUSS_GEN_ALL_CORE
the patient.  All imaging and results of lab/other studies reviewed by me. All questions answered to their satisfaction. At this time they agree with the current plan of therapy.
patient, RN
patient, SW, RN, NP
patient, son at bedside, RN, SW, NP

## 2019-07-30 NOTE — PROGRESS NOTE ADULT - ATTENDING COMMENTS
case discussed w/ granddaughter @ bedside
I have seen and examined the patient and agree with the above assessment and plan. Patient denies any active complaints. Renal function stable. Hyperkalemia resolved. Diarrhea improved; add imodium PRN. SW on board to arrange for [facility]. Medically cleared.    Vitals stable.  Physical exam:  General: elderly male, sitting in chair, NAD  HEENT: MMM  CVS: RRR, + S1/S2  GI: soft, obese, nontender  Ext: ++ LE lymphedema
I have seen and examined the patient and agree with the above assessment and plan. Patient reports feeling well; off supplemental O2. Bilateral LE ACE wrap in place. Toes cool to touch, but pulses obtained via doppler. Denies any pain or paresthesias. States diarrhea has improved; rise in creatinine noted. Encouraged to increase PO intake today. Hold Torsemide. Repeat labs tomorrow. SW on board to arrange for [facility].    Vitals stable  Physical exam:   General - elderly male, sitting in chair, NAD  HEENT - MMM  CVS: RRR, + S1/S2  Resp: bilateral air entry, coarse breath sounds  GI: soft, obese, nontender  Ext: lower extremity lymphedema, legs warm to touch, well perfused, toes cool to touch (with dopplerable pulses)

## 2019-07-30 NOTE — DISCHARGE NOTE PROVIDER - HOSPITAL COURSE
89 year old male with PMH of HTN, HFpEF, hypothyroidism, and chronic lymphedema presented s/p mechanical fall and found to have MATILDE secondary to rhabdo. ED course complicated by development of acute respiratory failure due to CHF exacerbation and pneumonia.  TTE showed EF 60-65%, mild valvulopathy.  CT confirmed extensive left sided disease.  LE duplex negative for DVT.  Patient treated with IV antibiotics, PO steroids and bronchodilators.  Blood cultures were negative.  Cardiology and pulmonary following. Patient will need repeat ct chest in 8wks, expect prolonged recovery given comorbidities + advanced age causing relative immunosuppression.  During hospitalization, patient had diarrhea and diuretics held.  Patient to be re-evaluated outpatient for resumption of diuretics.  Patient evaluated by PT and recommended [facility].  Patient stable for discharge to [facility]. 89 year old male with PMH of HTN, HFpEF, hypothyroidism, and chronic lymphedema presented s/p mechanical fall and found to have MATILDE secondary to rhabdo. ED course complicated by development of acute respiratory failure due to CHF exacerbation and pneumonia.  TTE showed EF 60-65%, mild valvulopathy.  CT confirmed extensive left sided disease.  LE duplex negative for DVT.  Patient treated with IV antibiotics, PO steroids and bronchodilators.  Blood cultures were negative.  Cardiology and pulmonary following. Patient will need repeat ct chest in 8wks, expect prolonged recovery given comorbidities + advanced age causing relative immunosuppression.  During hospitalization, patient had diarrhea and diuretics held.  Patient to be re-evaluated outpatient for resumption of diuretics.  Patient evaluated by PT and recommended [facility].  Patient stable for discharge to [facility].         Medically stable for discharge to rehab. Time spent on discharge 45 minutes.

## 2019-07-31 VITALS — DIASTOLIC BLOOD PRESSURE: 50 MMHG | HEART RATE: 67 BPM | SYSTOLIC BLOOD PRESSURE: 138 MMHG

## 2019-07-31 PROCEDURE — 70450 CT HEAD/BRAIN W/O DYE: CPT

## 2019-07-31 PROCEDURE — 71045 X-RAY EXAM CHEST 1 VIEW: CPT

## 2019-07-31 PROCEDURE — 94660 CPAP INITIATION&MGMT: CPT

## 2019-07-31 PROCEDURE — 97110 THERAPEUTIC EXERCISES: CPT

## 2019-07-31 PROCEDURE — 83735 ASSAY OF MAGNESIUM: CPT

## 2019-07-31 PROCEDURE — 84484 ASSAY OF TROPONIN QUANT: CPT

## 2019-07-31 PROCEDURE — 82550 ASSAY OF CK (CPK): CPT

## 2019-07-31 PROCEDURE — 97116 GAIT TRAINING THERAPY: CPT

## 2019-07-31 PROCEDURE — 70486 CT MAXILLOFACIAL W/O DYE: CPT

## 2019-07-31 PROCEDURE — 87581 M.PNEUMON DNA AMP PROBE: CPT

## 2019-07-31 PROCEDURE — 93005 ELECTROCARDIOGRAM TRACING: CPT

## 2019-07-31 PROCEDURE — 81001 URINALYSIS AUTO W/SCOPE: CPT

## 2019-07-31 PROCEDURE — 94640 AIRWAY INHALATION TREATMENT: CPT

## 2019-07-31 PROCEDURE — 82553 CREATINE MB FRACTION: CPT

## 2019-07-31 PROCEDURE — 99239 HOSP IP/OBS DSCHRG MGMT >30: CPT

## 2019-07-31 PROCEDURE — 83605 ASSAY OF LACTIC ACID: CPT

## 2019-07-31 PROCEDURE — 82803 BLOOD GASES ANY COMBINATION: CPT

## 2019-07-31 PROCEDURE — 80048 BASIC METABOLIC PNL TOTAL CA: CPT

## 2019-07-31 PROCEDURE — 87798 DETECT AGENT NOS DNA AMP: CPT

## 2019-07-31 PROCEDURE — 36600 WITHDRAWAL OF ARTERIAL BLOOD: CPT

## 2019-07-31 PROCEDURE — 87633 RESP VIRUS 12-25 TARGETS: CPT

## 2019-07-31 PROCEDURE — 84145 PROCALCITONIN (PCT): CPT

## 2019-07-31 PROCEDURE — 99285 EMERGENCY DEPT VISIT HI MDM: CPT

## 2019-07-31 PROCEDURE — 94760 N-INVAS EAR/PLS OXIMETRY 1: CPT

## 2019-07-31 PROCEDURE — 93306 TTE W/DOPPLER COMPLETE: CPT

## 2019-07-31 PROCEDURE — 87449 NOS EACH ORGANISM AG IA: CPT

## 2019-07-31 PROCEDURE — 36415 COLL VENOUS BLD VENIPUNCTURE: CPT

## 2019-07-31 PROCEDURE — 87486 CHLMYD PNEUM DNA AMP PROBE: CPT

## 2019-07-31 PROCEDURE — 85027 COMPLETE CBC AUTOMATED: CPT

## 2019-07-31 PROCEDURE — 87040 BLOOD CULTURE FOR BACTERIA: CPT

## 2019-07-31 PROCEDURE — 80053 COMPREHEN METABOLIC PANEL: CPT

## 2019-07-31 PROCEDURE — 93970 EXTREMITY STUDY: CPT

## 2019-07-31 PROCEDURE — 71250 CT THORAX DX C-: CPT

## 2019-07-31 PROCEDURE — 97530 THERAPEUTIC ACTIVITIES: CPT

## 2019-07-31 PROCEDURE — 84100 ASSAY OF PHOSPHORUS: CPT

## 2019-07-31 PROCEDURE — 83880 ASSAY OF NATRIURETIC PEPTIDE: CPT

## 2019-07-31 PROCEDURE — 97163 PT EVAL HIGH COMPLEX 45 MIN: CPT

## 2019-07-31 RX ADMIN — Medication 50 MICROGRAM(S): at 06:48

## 2019-07-31 RX ADMIN — HEPARIN SODIUM 5000 UNIT(S): 5000 INJECTION INTRAVENOUS; SUBCUTANEOUS at 06:48

## 2019-07-31 RX ADMIN — Medication 600 MILLIGRAM(S): at 06:47

## 2019-07-31 RX ADMIN — ISOSORBIDE MONONITRATE 30 MILLIGRAM(S): 60 TABLET, EXTENDED RELEASE ORAL at 12:12

## 2019-07-31 RX ADMIN — Medication 60 MILLIGRAM(S): at 06:48

## 2019-07-31 RX ADMIN — Medication 25 MILLIGRAM(S): at 06:48

## 2019-07-31 RX ADMIN — Medication 250 MILLIGRAM(S): at 06:48

## 2019-07-31 RX ADMIN — Medication 325 MILLIGRAM(S): at 12:12

## 2019-07-31 RX ADMIN — CARVEDILOL PHOSPHATE 12.5 MILLIGRAM(S): 80 CAPSULE, EXTENDED RELEASE ORAL at 06:47

## 2019-07-31 NOTE — DISCHARGE NOTE NURSING/CASE MANAGEMENT/SOCIAL WORK - NSDCDPATPORTLINK_GEN_ALL_CORE
You can access the [facility] Patient Portal, offered by [facility], by registering with the following website: [email or web address]

## 2019-07-31 NOTE — PROGRESS NOTE ADULT - REASON FOR ADMISSION
fall, rhabdo, MATILDE

## 2019-07-31 NOTE — PROGRESS NOTE ADULT - PROVIDER SPECIALTY LIST ADULT
Cardiology
Hospitalist

## 2019-07-31 NOTE — PROGRESS NOTE ADULT - ASSESSMENT
89yoM hx HTN, HFpEF, hypothyroidism, chronic lymphedema presenting sp fall w/ tristen sec to rhabdo.    Ed course complicated by development of acute resp failure, multifactorial, sec to hf exac + cap.     #acute resp failure w/ hypoxia, likely multifactorial sec to LT sided CAP w/ sepsis + acute on chronic hfref. stable  sepsis resolved  rocephin started 7/23, 7d, last day 7/29, change to vantin to complete course, florastor  nataly procal, inc, likely was not at peak on first check, follow  ct chest confirming extensive LT sided disease  blood cx neg  sp bipap, titrated to NC, tolerating well, 95% on RA @ rest, check amb o2 when pt more mobile  start prednisone 60mg x5d course for continued resp distress w/ onset of wheeze, nebs prn + standing  supportive care, chest pt, mucinex  appreciated pulm eval  repeat ct chest 8wks, expect prolonged recovery given comorbidities + advanced age causing relative immunosuppression    #acute on chronic hfref. resolved.   hf exac likely sec to acute infection, pt adherent w/ meds + diet + lifestyle, sp iv lasix  strict i/o, daily wt, resume home dose po lasix once renal function + rhabdo improved/resolved  echo ef 60-65%, mild valvulopathy  trop neg x3  ss cardio eval appreciated    #tristen sec to atn from rhabdomyolysis, asymptomatic. stable  cr trended down since admit, pending repeat labs today  baseline cr 1.7  ua w/o uti, casts + hematuria from atn  hold fluids, repeat cxr to r/o onset of pleural effusions in setting of volume admin + known HF  serial cpk + bmp  nephro eval w/ [facility] appreciated  outpt repeat ua after resolution, if hematuria still present urology eval  outpt nephro fu    #htn. controlled    #gait instability complicated by fall.   PT eval appreciated, home PT initially, now rec ponce    #dvt ppx. sqh    #dispo. dc home once resp + renal failure resolved, likely in 2-3 days    #outpt fu. pmd, pulm, nephro Dr obrien, cardio
Patient is a 89 year old male with PMH of HTN, HFpEF, hypothyroidism, and chronic lymphedema presented s/p mechanical fall and found to have MATILDE secondary to rhabdo. ED course complicated by development of acute respiratory failure due to CHF exacerbation and pneumonia.      #Acute Hypoxic Respiratory Failure secondary to left sided pneumonia (suspected gram negative organism) and acute on chronic HFpEF  sepsis present on admission, now resolved  continue bronchodilators as needed and weaned off O2; pulse ox 92% on room air at rest  hold diuretic due to ongoing diarrhea   blood culture negative to date; completed antibiotics  CT chest confirmed extensive LT sided disease  Continue empiric prednisone  continue chest PT  appreciated pulm and cardiology recommendations   repeat ct chest 8wks, expect prolonged recovery given comorbidities + advanced age causing relative immunosuppression    #Acute on Chronic HFpEF  hold diuretic due to ongoing diarrhea, can re-evaluate resumption after discharge.  continue beta-blocker  strict i/o, daily weights  echo ef 60-65%, mild valvulopathy  cardiology consult appreciated  duplex negative for DVT  ACE wrap bilateral LE     #MATILDE secondary to rhabdomyolysis; superimposed on CKD Stage 3  baseline cr 1.7 in 2017  renal function improved to baseline with IVF  Hold Torsemide due to ongoing diarrhea; encourage PO fluids  Repeat CXR reviewed  strict I/Os, daily weights  monitor BMP  renal recommendations reviewed; outpatient follow up    #HTN - uncontrolled  -continue metoprolol and hydralazine  -low sodium diet    #Gait instability complicated by fall  PT re-evaluation recommending [facility]BERTO and patient agreeable    #Diarrhea - likely antibiotic induced  -completed abx   -reports decreasing episodes  -hold diuretics   -does not meet consistency for C.Diff    DVT ppx - Heparin SC      Disposition: [facility] today
89 y/o male PMH HFpEF, hypothyroid, HTN, chronic lymphedema presents to ED s/p fall at home. Son at bedside, Pt karla x4, lives independently at home, has visiting RN and PA, monthly. Ambulates with walker, states yesterday went to bathroom, went to stand up, and "legs gave out on me". denies lightheaded, dizziness, loss of consciousness, trauma, chest pain, palpitations, shortness of breath, nausea, vomiting, diarrhea. Pt laid on floor for several hours prior to being able to crawl to phone to call EMS. In ED decompensated overnight, sudden onset difficulty breathing, wheezes, not alleviated with nebulizer; required NIPPV to maintain oxygen saturation, IV lasix administered. Patient is off NIPPV.  Patients Chest CT scan showed large lobar pneuomonia, echo results noted.      1. Pneumonia- noted on chest CT which explains his dyspnea.  - Management as per the hospitalist and pulm    2. Acute on Chronic HFpEF, Mild AS and Mild MS  -  continue coreg, isordil daily   - Diurese as needed monitoring renal fumction.    I will sign off    Discussed with Dr Natalie Grubbs.
89yoM hx HTN, HFpEF, hypothyroidism, chronic lymphedema presenting sp fall w/ tristen sec to rhabdo.    Ed course complicated by development of acute resp failure, multifactorial, sec to hf exac + cap.     #acute resp failure w/ hypoxia, likely multifactorial sec to LT sided CAP w/ sepsis + acute on chronic hfref. improved  sepsis resolved  rvp neg, pending urine legionella, collected already, if neg stop zithro started 7/23  rocephin started 7/23, 7d, last day 7/29, florastor  trend procal  ct chest confirming extensive LT sided disease  blood cx pending  sp bipap, titrated to NC, tolerating well, titrate to RA as tolerated  will consider steroids if no significant improvement within 24hrs  supportive care, chest pt, mucinex  pending pulm eval    #acute on chronic hfref. resolved.   hf exac likely sec to acute infection, pt adherent w/ meds + diet + lifestyle, sp iv lasix  strict i/o, daily wt, resume home dose po lasix once renal function + rhabdo improved  echo pending report, performed already  trop neg x3, keep on tele until echo resulted  ss cardio eval appreciated    #tristen sec to atn from rhabdomyolysis, stable, asymptomatic  tristen stable  baseline cr 1.7  ua w/o uti, casts + hematuria from atn  holding aggressive ivf sec to hf exac  serial cpk + bmp  nephro eval w/ [facility] pending  outpt repeat ua after resolution, if hematuria still present urology eval    #htn. controlled    #gait instability complicated by fall.   PT eval appreciated, home PT.    #dvt ppx. sqh    #dispo. dc home once resp + renal failure resolved, likely in 3-4 days    #outpt fu. pmd, pulm, nephro, cardio
89yoM hx HTN, HFpEF, hypothyroidism, chronic lymphedema presenting sp fall w/ tristen sec to rhabdo.    Ed course complicated by development of acute resp failure, multifactorial, sec to hf exac + cap.     #acute resp failure w/ hypoxia, likely multifactorial sec to LT sided CAP w/ sepsis + acute on chronic hfref. stabke  sepsis resolved  rvp neg,neg urine legionella, stop zithro  rocephin started 7/23, 7d, last day 7/29, florastor  trend procal, inc, likely was not at peak on first check, follow  ct chest confirming extensive LT sided disease  blood cx neg  sp bipap, titrated to NC, tolerating well, titrate to RA as tolerated  no indication steroids  supportive care, chest pt, mucinex  appreciated pulm eval  repeat ct chest 8wks    #acute on chronic hfref. resolved.   hf exac likely sec to acute infection, pt adherent w/ meds + diet + lifestyle, sp iv lasix  strict i/o, daily wt, resume home dose po lasix once renal function + rhabdo improved/resolved  echo ef 60-65%, mild valvulopathy  trop neg x3, dc tele  ss cardio eval appreciated    #tristen sec to atn from rhabdomyolysis, asymptomatic. improving  cr trending down  baseline cr 1.7  ua w/o uti, casts + hematuria from atn  gentle ivf  serial cpk + bmp  nephro eval w/ [facility] appreciated  outpt repeat ua after resolution, if hematuria still present urology eval  outpt nephro fu    #htn. controlled    #gait instability complicated by fall.   PT eval appreciated, home PT initially, now rec ponce    #dvt ppx. sqh    #dispo. dc home once resp + renal failure resolved, likely in 2-3 days    #outpt fu. pmd, pulm, nephro Dr obrien, cardio
Patient is a 89 year old male with PMH of HTN, HFpEF, hypothyroidism, and chronic lymphedema presented s/p mechanical fall and found to have MATILDE secondary to rhabdo. ED course complicated by development of acute respiratory failure due to CHF exacerbation and pneumonia.      #Acute Hypoxic Respiratory Failure secondary to left sided pneumonia (suspected gram negative organism) and acute on chronic HFpEF  sepsis present on admission, now resolved  continue bronchodilators as needed and wean off O2; pulse ox 92% on room air at rest  hold diuretic due to ongoing diarrhea   blood culture negative to date; complete antibiotics today along with florastor  CT chest confirmed extensive LT sided disease  Continue empiric prednisone  continue chest PT  appreciated pulm and cardiology recommendations   repeat ct chest 8wks, expect prolonged recovery given comorbidities + advanced age causing relative immunosuppression    #Acute on Chronic HFpEF  hold diuretic due to ongoing diarrhea  continue beta-blocker  strict i/o, daily weights  echo ef 60-65%, mild valvulopathy  cardiology consult appreciated  duplex negative for DVT  ACE wrap bilateral LE     #MATILDE secondary to rhabdomyolysis; superimposed on CKD Stage 3  baseline cr 1.7 in 2017  renal function improved to baseline with IVF  Hold Torsemide due to ongoing diarrhea; encourage PO fluids  Repeat CXR reviewed  strict I/Os, daily weights  monitor BMP  renal recommendations reviewed; outpatient follow up    #HTN - uncontrolled  -continue metoprolol and hydralazine  -low sodium diet    #Gait instability complicated by fall  PT re-evaluation recommending [facility] and patient agreeable    #Diarrhea - likely antibiotic induced  -last day of abx today  -reports decreasing episodes today  -hold diuretics   -does not meet consistency for C.Diff    DVT ppx - Heparin SC
Patient is a 89 year old male with PMH of HTN, HFpEF, hypothyroidism, and chronic lymphedema presented s/p mechanical fall and found to have MATILDE secondary to rhabdo. ED course complicated by development of acute respiratory failure due to CHF exacerbation and pneumonia.      #Acute Hypoxic Respiratory Failure secondary to left sided pneumonia (suspected gram negative organism) and acute on chronic HFpEF  sepsis present on admission, now resolved  continue bronchodilators as needed and weaned off O2; pulse ox 92% on room air at rest  hold diuretic due to ongoing diarrhea   blood culture negative to date; completed antibiotics  CT chest confirmed extensive LT sided disease  Continue empiric prednisone  continue chest PT  appreciated pulm and cardiology recommendations   repeat ct chest 8wks, expect prolonged recovery given comorbidities + advanced age causing relative immunosuppression    #Acute on Chronic HFpEF  hold diuretic due to ongoing diarrhea, can re-evaluate resumption after discharge.  continue beta-blocker  strict i/o, daily weights  echo ef 60-65%, mild valvulopathy  cardiology consult appreciated  duplex negative for DVT  ACE wrap bilateral LE     #MATILDE secondary to rhabdomyolysis; superimposed on CKD Stage 3  baseline cr 1.7 in 2017  renal function improved to baseline with IVF  Hold Torsemide due to ongoing diarrhea; encourage PO fluids  Repeat CXR reviewed  strict I/Os, daily weights  monitor BMP  renal recommendations reviewed; outpatient follow up    #HTN - uncontrolled  -continue metoprolol and hydralazine  -low sodium diet    #Gait instability complicated by fall  PT re-evaluation recommending [facility]O and patient agreeable    #Diarrhea - likely antibiotic induced  -completed abx   -reports decreasing episodes today  -hold diuretics   -does not meet consistency for C.Diff    DVT ppx - Heparin SC      Disposition: [facility] when insurance auth obtained
Patient is a 89 year old male with PMH of HTN, HFpEF, hypothyroidism, and chronic lymphedema presented s/p mechanical fall and found to have MATILDE secondary to rhabdo. ED course complicated by development of acute respiratory failure due to CHF exacerbation and pneumonia.      #Acute Hypoxic Respiratory Failure secondary to left sided pneumonia (suspected gram negative organism) and acute on chronic HFpEF  sepsis present on admission, now resolved  continue bronchodilators as needed and wean off O2; pulse ox 92% on room air at rest  hold diuretic due to ongoing diarrhea   blood culture negative to date; continue antibiotics until 7/29 along with florastor  CT chest confirmed extensive LT sided disease  Continue empiric prednisone  continue chest PT  appreciated pulm and cardiology recommendations   repeat ct chest 8wks, expect prolonged recovery given comorbidities + advanced age causing relative immunosuppression    #Acute on Chronic HFpEF  hold diuretic due to ongoing diarrhea  continue beta-blocker  strict i/o, daily weights  echo ef 60-65%, mild valvulopathy  cardiology consult appreciated  duplex negative for DVT  ACE wrap bilateral LE     #MATILDE secondary to rhabdomyolysis; superimposed on CKD Stage 3  baseline cr 1.7 in 2017  renal function improved to baseline with IVF  Hold Torsemide due to ongoing diarrhea  Repeat CXR reviewed  strict I/Os, daily weights  monitor BMP  renal recommendations reviewed; outpatient follow up    #HTN - uncontrolled  -continue metoprolol  -add hydralazine if needed  -low sodium diet    #Gait instability complicated by fall  PT re-evaluation recommending HUMBERTO and patient agreeable    #Diarrhea - likely antibiotic induced  -last day of abx tomorrow  -reports increasing episodes today  -hold diuretics   -does not meet consistency for C.Diff    DVT ppx - Heparin SC      Attending Attestation:   Plan discussed with patient, RN.
Patient is a 89 year old male with PMH of HTN, HFpEF, hypothyroidism, and chronic lymphedema presented s/p mechanical fall and found to have MATILDE secondary to rhabdo. ED course complicated by development of acute respiratory failure due to CHF exacerbation and pneumonia.      #Acute Hypoxic Respiratory Failure secondary to left sided pneumonia (suspected gram negative organism) and acute on chronic HFpEF  sepsis present on admission, now resolved  continue supplemental O2 and bronchodilators as needed  re-start diuretic and monitor renal function  blood culture negative to date; continue antibiotics until 7/29 along with florastor  CT chest confirmed extensive LT sided disease  Continue empiric prednisone  check pulse ox on room air  continue chest PT  appreciated pulm and cardiology recommendations   repeat ct chest 8wks, expect prolonged recovery given comorbidities + advanced age causing relative immunosuppression    #Acute on Chronic HFpEF  start torsemide 20 mg daily  continue beta-blocker  strict i/o, daily weights  echo ef 60-65%, mild valvulopathy  cardiology consult appreciated  check LE duplex to rule out DVT    #MATILDE secondary to rhabdomyolysis; superimposed on CKD Stage 3  baseline cr 1.7 in 2017  renal function improved to baseline with IVF  IVF held and repeat CXR reviewed  start torsemide 20 mg daily and monitor renal function  strict I/Os, daily weights  monitor BMP  renal recommendations reviewed; outpatient follow up    #HTN - uncontrolled  -continue metoprolol  -re-start torsemide  -add hydralazine if needed  -low sodium diet    #Gait instability complicated by fall  PT re-evaluation recommending [facility]ERTO and patient agreeable    #Diarrhea - likely antibiotic induced  -improving, reports only 2 episodes today  -does not meet consistency for C.Diff    DVT ppx - Heparin SC

## 2019-07-31 NOTE — PROGRESS NOTE ADULT - SUBJECTIVE AND OBJECTIVE BOX
CC: fall, MATILDE aden (30 Jul 2019 13:41)    HPI:  89yoM hx HTN, HFpEF, hypothyroidism, chronic lymphedema, presenting with unwitnessed fall at home.      INTERVAL HPI/OVERNIGHT EVENTS: Patient seen and examined sitting up in the chair.  Patient denies any headache, dizziness, SOB, CP, abdominal pain, nausea, vomiting, dysuria.  Other ROS reviewed and are negative.    Vital Signs Last 24 Hrs  T(C): 36.5 (31 Jul 2019 10:16), Max: 36.8 (30 Jul 2019 16:50)  T(F): 97.7 (31 Jul 2019 10:16), Max: 98.2 (30 Jul 2019 16:50)  HR: 67 (31 Jul 2019 12:11) (63 - 96)  BP: 138/50 (31 Jul 2019 12:11) (119/56 - 161/60)  BP(mean): --  RR: 18 (31 Jul 2019 10:16) (18 - 18)  SpO2: 98% (31 Jul 2019 10:16) (94% - 98%)  I&O's Detail    30 Jul 2019 07:01  -  31 Jul 2019 07:00  --------------------------------------------------------  IN:    Oral Fluid: 240 mL  Total IN: 240 mL    OUT:    Voided: 100 mL  Total OUT: 100 mL    Total NET: 140 mL        PHYSICAL EXAM:  GENERAL: NAD  HEAD:  Atraumatic, Normocephalic  NECK: Supple, No JVD, Normal thyroid  NERVOUS SYSTEM:  Alert & Oriented X3, Good concentration; generalized weakness  CHEST/LUNG: Clear to auscultation bilaterally; No rales, rhonchi, wheezing, or rubs  HEART: Regular rate and rhythm; No murmurs, rubs, or gallops  ABDOMEN: Soft, Nontender, Nondistended; Bowel sounds present  EXTREMITIES:  2+ Peripheral Pulses, bilateral le lymphedema          29 Jul 2019 17:26    137    |  102    |  67.0   ----------------------------<  182    4.9     |  24.0   |  1.84     Ca    8.8        29 Jul 2019 17:26        MEDICATIONS  (STANDING):  aspirin enteric coated 325 milliGRAM(s) Oral daily  carvedilol 12.5 milliGRAM(s) Oral every 12 hours  guaiFENesin  milliGRAM(s) Oral every 12 hours  heparin  Injectable 5000 Unit(s) SubCutaneous every 12 hours  hydrALAZINE 25 milliGRAM(s) Oral two times a day  isosorbide   mononitrate ER Tablet (IMDUR) 30 milliGRAM(s) Oral daily  levothyroxine 50 MICROGram(s) Oral daily  saccharomyces boulardii 250 milliGRAM(s) Oral two times a day    MEDICATIONS  (PRN):  ALBUTerol    0.083% 2.5 milliGRAM(s) Nebulizer every 4 hours PRN Shortness of Breath and/or Wheezing  benzonatate 100 milliGRAM(s) Oral three times a day PRN Cough  loperamide 2 milliGRAM(s) Oral three times a day PRN Diarrhea

## 2019-08-19 ENCOUNTER — INPATIENT (INPATIENT)
Facility: HOSPITAL | Age: 84
LOS: 5 days | Discharge: NURSING, REHAB OR LONG-TERM CARE | DRG: 291 | End: 2019-08-25
Attending: HOSPITALIST | Admitting: INTERNAL MEDICINE
Payer: MEDICARE

## 2019-08-19 VITALS
DIASTOLIC BLOOD PRESSURE: 78 MMHG | OXYGEN SATURATION: 100 % | HEIGHT: 66 IN | HEART RATE: 70 BPM | RESPIRATION RATE: 21 BRPM | TEMPERATURE: 98 F | WEIGHT: 253.09 LBS | SYSTOLIC BLOOD PRESSURE: 156 MMHG

## 2019-08-19 DIAGNOSIS — Z98.890 OTHER SPECIFIED POSTPROCEDURAL STATES: Chronic | ICD-10-CM

## 2019-08-19 DIAGNOSIS — I50.9 HEART FAILURE, UNSPECIFIED: ICD-10-CM

## 2019-08-19 PROBLEM — N40.0 BENIGN PROSTATIC HYPERPLASIA WITHOUT LOWER URINARY TRACT SYMPTOMS: Chronic | Status: ACTIVE | Noted: 2019-07-22

## 2019-08-19 PROBLEM — E03.9 HYPOTHYROIDISM, UNSPECIFIED: Chronic | Status: ACTIVE | Noted: 2019-07-22

## 2019-08-19 LAB
ALBUMIN SERPL ELPH-MCNC: 3.4 G/DL — SIGNIFICANT CHANGE UP (ref 3.3–5.2)
ALP SERPL-CCNC: 88 U/L — SIGNIFICANT CHANGE UP (ref 40–120)
ALT FLD-CCNC: 15 U/L — SIGNIFICANT CHANGE UP
ANION GAP SERPL CALC-SCNC: 10 MMOL/L — SIGNIFICANT CHANGE UP (ref 5–17)
APTT BLD: 29.6 SEC — SIGNIFICANT CHANGE UP (ref 27.5–36.3)
AST SERPL-CCNC: 18 U/L — SIGNIFICANT CHANGE UP
BASOPHILS # BLD AUTO: 0.03 K/UL — SIGNIFICANT CHANGE UP (ref 0–0.2)
BASOPHILS NFR BLD AUTO: 0.5 % — SIGNIFICANT CHANGE UP (ref 0–2)
BILIRUB SERPL-MCNC: 0.4 MG/DL — SIGNIFICANT CHANGE UP (ref 0.4–2)
BUN SERPL-MCNC: 35 MG/DL — HIGH (ref 8–20)
CALCIUM SERPL-MCNC: 8.7 MG/DL — SIGNIFICANT CHANGE UP (ref 8.6–10.2)
CHLORIDE SERPL-SCNC: 108 MMOL/L — HIGH (ref 98–107)
CO2 SERPL-SCNC: 22 MMOL/L — SIGNIFICANT CHANGE UP (ref 22–29)
CREAT SERPL-MCNC: 1.71 MG/DL — HIGH (ref 0.5–1.3)
EOSINOPHIL # BLD AUTO: 0.14 K/UL — SIGNIFICANT CHANGE UP (ref 0–0.5)
EOSINOPHIL NFR BLD AUTO: 2.2 % — SIGNIFICANT CHANGE UP (ref 0–6)
GLUCOSE SERPL-MCNC: 99 MG/DL — SIGNIFICANT CHANGE UP (ref 70–115)
HCT VFR BLD CALC: 35.6 % — LOW (ref 39–50)
HGB BLD-MCNC: 10.8 G/DL — LOW (ref 13–17)
IMM GRANULOCYTES NFR BLD AUTO: 0.5 % — SIGNIFICANT CHANGE UP (ref 0–1.5)
INR BLD: 1 RATIO — SIGNIFICANT CHANGE UP (ref 0.88–1.16)
LYMPHOCYTES # BLD AUTO: 0.6 K/UL — LOW (ref 1–3.3)
LYMPHOCYTES # BLD AUTO: 9.5 % — LOW (ref 13–44)
MAGNESIUM SERPL-MCNC: 2.3 MG/DL — SIGNIFICANT CHANGE UP (ref 1.6–2.6)
MCHC RBC-ENTMCNC: 30.3 GM/DL — LOW (ref 32–36)
MCHC RBC-ENTMCNC: 30.4 PG — SIGNIFICANT CHANGE UP (ref 27–34)
MCV RBC AUTO: 100.3 FL — HIGH (ref 80–100)
MONOCYTES # BLD AUTO: 0.93 K/UL — HIGH (ref 0–0.9)
MONOCYTES NFR BLD AUTO: 14.7 % — HIGH (ref 2–14)
NEUTROPHILS # BLD AUTO: 4.61 K/UL — SIGNIFICANT CHANGE UP (ref 1.8–7.4)
NEUTROPHILS NFR BLD AUTO: 72.6 % — SIGNIFICANT CHANGE UP (ref 43–77)
NT-PROBNP SERPL-SCNC: 3394 PG/ML — HIGH (ref 0–300)
PLATELET # BLD AUTO: 242 K/UL — SIGNIFICANT CHANGE UP (ref 150–400)
POTASSIUM SERPL-MCNC: 5.3 MMOL/L — SIGNIFICANT CHANGE UP (ref 3.5–5.3)
POTASSIUM SERPL-MCNC: 6.3 MMOL/L — CRITICAL HIGH (ref 3.5–5.3)
POTASSIUM SERPL-SCNC: 5.3 MMOL/L — SIGNIFICANT CHANGE UP (ref 3.5–5.3)
POTASSIUM SERPL-SCNC: 6.3 MMOL/L — CRITICAL HIGH (ref 3.5–5.3)
PROT SERPL-MCNC: 6.8 G/DL — SIGNIFICANT CHANGE UP (ref 6.6–8.7)
PROTHROM AB SERPL-ACNC: 11.5 SEC — SIGNIFICANT CHANGE UP (ref 10–12.9)
RBC # BLD: 3.55 M/UL — LOW (ref 4.2–5.8)
RBC # FLD: 14 % — SIGNIFICANT CHANGE UP (ref 10.3–14.5)
SODIUM SERPL-SCNC: 140 MMOL/L — SIGNIFICANT CHANGE UP (ref 135–145)
TROPONIN T SERPL-MCNC: 0.02 NG/ML — SIGNIFICANT CHANGE UP (ref 0–0.06)
WBC # BLD: 6.34 K/UL — SIGNIFICANT CHANGE UP (ref 3.8–10.5)
WBC # FLD AUTO: 6.34 K/UL — SIGNIFICANT CHANGE UP (ref 3.8–10.5)

## 2019-08-19 PROCEDURE — 71045 X-RAY EXAM CHEST 1 VIEW: CPT | Mod: 26

## 2019-08-19 PROCEDURE — 99223 1ST HOSP IP/OBS HIGH 75: CPT

## 2019-08-19 PROCEDURE — 99285 EMERGENCY DEPT VISIT HI MDM: CPT

## 2019-08-19 RX ORDER — HEPARIN SODIUM 5000 [USP'U]/ML
5000 INJECTION INTRAVENOUS; SUBCUTANEOUS EVERY 8 HOURS
Refills: 0 | Status: DISCONTINUED | OUTPATIENT
Start: 2019-08-19 | End: 2019-08-25

## 2019-08-19 RX ORDER — LEVOTHYROXINE SODIUM 125 MCG
50 TABLET ORAL DAILY
Refills: 0 | Status: DISCONTINUED | OUTPATIENT
Start: 2019-08-19 | End: 2019-08-25

## 2019-08-19 RX ORDER — CHLORHEXIDINE GLUCONATE 213 G/1000ML
1 SOLUTION TOPICAL DAILY
Refills: 0 | Status: DISCONTINUED | OUTPATIENT
Start: 2019-08-19 | End: 2019-08-25

## 2019-08-19 RX ORDER — FUROSEMIDE 40 MG
40 TABLET ORAL DAILY
Refills: 0 | Status: DISCONTINUED | OUTPATIENT
Start: 2019-08-20 | End: 2019-08-23

## 2019-08-19 RX ORDER — SENNA PLUS 8.6 MG/1
2 TABLET ORAL AT BEDTIME
Refills: 0 | Status: DISCONTINUED | OUTPATIENT
Start: 2019-08-19 | End: 2019-08-25

## 2019-08-19 RX ORDER — SODIUM BICARBONATE 1 MEQ/ML
50 SYRINGE (ML) INTRAVENOUS ONCE
Refills: 0 | Status: COMPLETED | OUTPATIENT
Start: 2019-08-19 | End: 2019-08-19

## 2019-08-19 RX ORDER — CARVEDILOL PHOSPHATE 80 MG/1
12.5 CAPSULE, EXTENDED RELEASE ORAL
Refills: 0 | Status: DISCONTINUED | OUTPATIENT
Start: 2019-08-19 | End: 2019-08-19

## 2019-08-19 RX ORDER — SODIUM POLYSTYRENE SULFONATE 4.1 MEQ/G
30 POWDER, FOR SUSPENSION ORAL ONCE
Refills: 0 | Status: COMPLETED | OUTPATIENT
Start: 2019-08-19 | End: 2019-08-19

## 2019-08-19 RX ORDER — HYDRALAZINE HCL 50 MG
25 TABLET ORAL
Refills: 0 | Status: DISCONTINUED | OUTPATIENT
Start: 2019-08-19 | End: 2019-08-24

## 2019-08-19 RX ORDER — DOCUSATE SODIUM 100 MG
100 CAPSULE ORAL THREE TIMES A DAY
Refills: 0 | Status: DISCONTINUED | OUTPATIENT
Start: 2019-08-19 | End: 2019-08-25

## 2019-08-19 RX ORDER — CARVEDILOL PHOSPHATE 80 MG/1
12.5 CAPSULE, EXTENDED RELEASE ORAL EVERY 12 HOURS
Refills: 0 | Status: DISCONTINUED | OUTPATIENT
Start: 2019-08-19 | End: 2019-08-24

## 2019-08-19 RX ORDER — DEXTROSE 50 % IN WATER 50 %
100 SYRINGE (ML) INTRAVENOUS ONCE
Refills: 0 | Status: COMPLETED | OUTPATIENT
Start: 2019-08-19 | End: 2019-08-19

## 2019-08-19 RX ORDER — ASPIRIN/CALCIUM CARB/MAGNESIUM 324 MG
325 TABLET ORAL DAILY
Refills: 0 | Status: DISCONTINUED | OUTPATIENT
Start: 2019-08-19 | End: 2019-08-25

## 2019-08-19 RX ORDER — ISOSORBIDE MONONITRATE 60 MG/1
30 TABLET, EXTENDED RELEASE ORAL DAILY
Refills: 0 | Status: DISCONTINUED | OUTPATIENT
Start: 2019-08-19 | End: 2019-08-25

## 2019-08-19 RX ORDER — INSULIN HUMAN 100 [IU]/ML
6 INJECTION, SOLUTION SUBCUTANEOUS ONCE
Refills: 0 | Status: COMPLETED | OUTPATIENT
Start: 2019-08-19 | End: 2019-08-19

## 2019-08-19 RX ORDER — FUROSEMIDE 40 MG
40 TABLET ORAL ONCE
Refills: 0 | Status: COMPLETED | OUTPATIENT
Start: 2019-08-19 | End: 2019-08-19

## 2019-08-19 RX ADMIN — Medication 50 MILLIEQUIVALENT(S): at 12:34

## 2019-08-19 RX ADMIN — CARVEDILOL PHOSPHATE 12.5 MILLIGRAM(S): 80 CAPSULE, EXTENDED RELEASE ORAL at 18:59

## 2019-08-19 RX ADMIN — INSULIN HUMAN 6 UNIT(S): 100 INJECTION, SOLUTION SUBCUTANEOUS at 12:34

## 2019-08-19 RX ADMIN — Medication 40 MILLIGRAM(S): at 12:34

## 2019-08-19 RX ADMIN — HEPARIN SODIUM 5000 UNIT(S): 5000 INJECTION INTRAVENOUS; SUBCUTANEOUS at 21:48

## 2019-08-19 RX ADMIN — Medication 100 MILLIGRAM(S): at 21:48

## 2019-08-19 RX ADMIN — Medication 100 MILLILITER(S): at 12:34

## 2019-08-19 RX ADMIN — Medication 25 MILLIGRAM(S): at 18:59

## 2019-08-19 RX ADMIN — Medication 325 MILLIGRAM(S): at 18:58

## 2019-08-19 RX ADMIN — ISOSORBIDE MONONITRATE 30 MILLIGRAM(S): 60 TABLET, EXTENDED RELEASE ORAL at 18:59

## 2019-08-19 NOTE — ED PROVIDER NOTE - OBJECTIVE STATEMENT
Pt is a 89 yo M sent from NH for shortness of breath.  PMHx significant for htn, chf.  Pt states that for the past several days he has had gradually worsening shortness of breath. Pt states that he feels a rattling in his chest. Pt has been on oxygen for the past several days and typically does not need oxygen.  no n/v. no fever/chills. no cough. no cp. no other complaints.

## 2019-08-19 NOTE — CONSULT NOTE ADULT - ATTENDING COMMENTS
heart failure with preserved ejection fraction fluid overload due to inadequate diuresis.  Diuresis stopped in rehab due to CKD.  restart diuresis. monitor Kidney funciton  stop aldactone.  Probabyl underlying coronary artery disease . Medcial therapy.  no intervention. patient DNR/DNI  will admit for diuresis.   If recurrent [facility] admissions then plan for cardiomem.

## 2019-08-19 NOTE — ED PROVIDER NOTE - PHYSICAL EXAMINATION
Constitutional - well-developed; well nourished. Head - NCAT. Airway patent. Eyes - PERRL. CV - RRR. no murmur. no edema. Pulm - minimal rhonchi bilaterally. Abd - soft, nt. no rebound. no guarding. Neuro - A&Ox3. strength 5/5 x4. sensation intact x4. normal gait. Skin - No rash. MSK - normal ROM.   obese  chronic B/L LE lymphedema with erythema.

## 2019-08-19 NOTE — ED ADULT TRIAGE NOTE - NURSING HOMES
Former[facility], [facility] [place]

## 2019-08-19 NOTE — H&P ADULT - ASSESSMENT
Pt is a 89 yo M presenting from [facility] for dyspnea. PMH HTN, hypothyroidism, HFpEF, chronic lymphedema.     Acute on chronic HFpEF: likely due to reduced diuretic dosing.   -will give Lasix 40mg daily, monitor renal function and urine output  -fluid restricted cardiac diet.   -TTE from July 2019 showed normal EF  -consult [facility] cardiology.   -monitored bed w/   -CXR reviewed and shows vasc congestion  BNP 3395 decreased from 5553 on last admission    HTN: BP stable and at goal.   -resume hydralazine    Hypothyroidism continue synthroid    Anemia: no signs or symptoms of active bleeding. Continue to monitor hgb.     Hyperkalemia: hold aldactone.   -s/p insulin, dextrose, Lasix.  -repeat Potassium level    CKD: Cr stable and at baseline.   -of note recently had rhabdo and MATILDE on last hospitalization  -monitor renal fxn closely in setting of diuresis.     DVT ppx: heparin    DNR/DNI. MOLST REVIEWED.

## 2019-08-19 NOTE — CONSULT NOTE ADULT - ASSESSMENT
89 yo M presenting from [facility] for dyspnea. PMH HTN, hypothyroidism, HFpEF, chronic lymphedema. Patient states for the past 2 days he has been having dyspnea with very little exertion and even at rest. He has been requiring O2 while at his nursing facility however normally he does not require O2. He denies any orthopnea or PND, denies chest pain, palpitations, light-heaeddness, dizziness, abd pain, nausea, vomiting, melena, hematochezia, dysuria.   He has no other complaints, his SOB has improved since he has been in the hospital. Patients diuretics were held during his last hospitalization in July 2019 for PNA and CHF exac and he was currently on Aldactone 50mg/day but no lasix.   In ED he was noted to have K 6.3, given lasix, insulin, dextrose, and bicarbonate. Of note he has had chronic lymphedema in b/l LE for the past 10-15 years. (19 Aug 2019 13:17) 89 yo M presenting from [facility] for dyspnea. PMH HTN, hypothyroidism, HFpEF, chronic lymphedema. Patient states for the past 2 days he has been having dyspnea with very little exertion and even at rest. He has been requiring O2 while at his nursing facility however normally he does not require O2. He denies any orthopnea or PND, denies chest pain, palpitations, light-heaeddness, dizziness, abd pain, nausea, vomiting, melena, hematochezia, dysuria.   He has no other complaints, his SOB has improved since he has been in the hospital. Patients diuretics were held during his last hospitalization in July 2019 for PNA and CHF exac and he was currently on Aldactone 50mg/day but no lasix.   In ED he was noted to have K 6.3, given lasix, insulin, dextrose, and bicarbonate. Of note he has had chronic lymphedema in b/l LE for the past 10-15 years. (19 Aug 2019 13:17)    Acute on Chronic decompensated HFpEF  - 89 yo M presenting from [facility] for dyspnea. PMH HTN, hypothyroidism, HFpEF, chronic lymphedema. Patient states for the past 2 days he has been having dyspnea with very little exertion and even at rest. He has been requiring O2 while at his nursing facility however normally he does not require O2. He denies any orthopnea or PND, denies chest pain, palpitations, light-heaeddness, dizziness, abd pain, nausea, vomiting, melena, hematochezia, dysuria.   He has no other complaints, his SOB has improved since he has been in the hospital. Patients diuretics were held during his last hospitalization in July 2019 for PNA and CHF exac and he was currently on Aldactone 50mg/day but no lasix.   In ED he was noted to have K 6.3, given lasix, insulin, dextrose, and bicarbonate. Of note he has had chronic lymphedema in b/l LE for the past 10-15 years. (19 Aug 2019 13:17)    Acute on Chronic decompensated HFpEF  - Monitor on telemetry.   - Strict i/o and daily standing weights (if possible).    - Keep K > 4, Mg > 2.   - Monitor renal function with ongoing diuresis. History CKD  - continue lasix  - continue hydralazine/coreg  - most likely ischemia- medical management at this time.

## 2019-08-19 NOTE — ED ADULT TRIAGE NOTE - CHIEF COMPLAINT QUOTE
pt arrive by ambulance from [facility] with reports of worsening trouble breathing x1 week. does not usually use home O2, has been for past couple days. arrives on 4L NC.

## 2019-08-19 NOTE — ED ADULT NURSE NOTE - OBJECTIVE STATEMENT
Worsening SOB today, on 4LPM NC from nursing home, RA sat 92%.  Patient has hx of pneumonia 3 weeks ago.  Even and mild labored breathing, lung sounds ronchi bilateral.  Color good. Skin warm and dry.  Chronic lymphoedema to B/L LE w/ erythema.

## 2019-08-19 NOTE — H&P ADULT - HISTORY OF PRESENT ILLNESS
Pt is a 89 yo M presenting from [facility] for dyspnea. PMH HTN, hypothyroidism, HFpEF, chronic lymphedema.   Patient states for the past 2 days he has been having dyspnea with very little exertion and even at rest. He has been requiring O2 while at his nursing facility however normally he does not require O2. He denies any orthopnea or PND, denies chest pain, palpitations, light-heaeddness, dizziness, abd pain, nausea, vomiting, melena, hematochezia, dysuria.   He has no other complaints, his SOB has improved since he has been in the hospital. Patients diuretics were held during his last hospitalization in July 2019 for PNA and CHF exac and he was currently on Aldactone 50mg/day but no lasix.   In ED he was noted to have K 6.3, given lasix, insulin, dextrose, and bicarbonate.   Of note he has had chronic lymphedema in b/l LE for the past 10-15 years.

## 2019-08-19 NOTE — CONSULT NOTE ADULT - SUBJECTIVE AND OBJECTIVE BOX
[place] [facility]                                                       [facility]                                                        Office: [address] [address]                                                       Telephone: [phone]. Fax:[phone]                                                              CARDIOLOGY CONSULTATION NOTE                                                                                             Consult requested by:  Dr Harper  Reason for Consultation:  Heart failure   History obtained by: Patient and medical record   obtained: No    Chief complaint:    Patient is a 89y old  Male who presents with a chief complaint of CHF Exac (19 Aug 2019 13:17)    HPI:  Pt is a 89 [facility] M [facility]. PMH HTN, hypothyroidism, HFpEF, chronic lymphedema. Patient states for the past 2 days he has been having dyspnea with very little exertion and even at rest. He has been requiring O2 while at his nursing facility however normally he does not require O2. He denies any orthopnea or PND, denies chest pain, palpitations, light-heaeddness, dizziness, abd pain, nausea, vomiting, melena, hematochezia, dysuria.   He has no other complaints, his SOB has improved since he has been in the hospital. Patients diuretics were held during his last hospitalization in July 2019 for PNA and CHF exac and he was currently on Aldactone 50mg/day but no lasix.   In ED he was noted to have K 6.3, given lasix, insulin, dextrose, and bicarbonate. Of note he has had chronic lymphedema in b/l LE for the past 10-15 years. (19 Aug 2019 13:17)      REVIEW OF SYMPTOMS: Cardiovascular:  See HPI. No chest pain, No syncope, No palpitations, No dizziness,  No Paroxsymal nocturnal dyspnea;  Respiratory:  No cough,     Genitourinary:  No dysuria, no hematuria; Gastrointestinal:  No nausea, no vomiting. No diarrhea.  No abdominal pain. No dark color stool, no melena ; Neurological: No headache, no dizziness, no slurred speech;  Psychiatric: No agitation, no anxiety.  ALL OTHER REVIEW OF SYSTEMS ARE NEGATIVE.      ALLERGIES: Allergies  No Known Allergies  Intolerances      CURRENT MEDICATIONS:  carvedilol Oral Tab/Cap - Peds 12.5 milliGRAM(s) Oral two times a day  hydrALAZINE 25 milliGRAM(s) Oral two times a day  isosorbide   mononitrate ER Tablet (IMDUR) 30 milliGRAM(s) Oral daily  docusate sodium  aspirin enteric coated  heparin  Injectable  levothyroxine      HOME MEDICATIONS:      PAST MEDICAL HISTORY  Prostate enlargement  Hypothyroidism, unspecified type  HTN (hypertension)  CHF (congestive heart failure)      PAST SURGICAL HISTORY  History of prostate surgery  No significant past surgical history      FAMILY HISTORY:  No pertinent family history in first degree relatives      SOCIAL HISTORY:    CIGARETTES:   Denies   ALCOHOL: 2 drinks/night   DRUGS: Denies       Vital Signs Last 24 Hrs  T(C): 36.6 (19 Aug 2019 09:29), Max: 36.6 (19 Aug 2019 09:29)  T(F): 97.9 (19 Aug 2019 09:29), Max: 97.9 (19 Aug 2019 09:29)  HR: 70 (19 Aug 2019 09:29) (70 - 70)  BP: 156/78 (19 Aug 2019 09:29) (156/78 - 156/78)  RR: 21 (19 Aug 2019 09:29) (21 - 21)  SpO2: 100% (19 Aug 2019 09:29) (100% - 100%)      PHYSICAL EXAM:  Constitutional: Comfortable . No acute distress.   HEENT: Atraumatic and normocephalic, neck is supple. no JVD. No carotid bruit. PEERL   CNS: A&Ox3. No focal deficits. EOMI. Cranial nerves II-IX are intact.   Lymph Nodes: Cervical : Not palpable.  Respiratory:   Cardiovascular: S1S2 RRR. No murmur/rubs or gallop.  Gastrointestinal: Soft non-tender and non distended . +Bowel sounds. negative Zuniga's sign.  Extremities: No edema.   Psychiatric: Calm . no agitation.  Skin: No skin rash/ulcers visualized to face, hands or feet.      LABS:                   10.8   6.34  )-----------( 242      ( 19 Aug 2019 10:10 )             35.6     08-19  140  |  108<H>  |  35.0<H>  ----------------------------<  99  6.3<HH>   |  22.0  |  1.71<H>      Ca    8.7      19 Aug 2019 10:10  Mg     2.3     08-19      TPro  6.8  /  Alb  3.4  /  TBili  0.4  /  DBili  x   /  AST  18  /  ALT  15  /  AlkPhos  88  08-19      CARDIAC MARKERS ( 19 Aug 2019 10:10 )  x     / 0.02 ng/mL / x     / x     / x          ;p-BNP=Serum Pro-Brain Natriuretic Peptide: 3394 pg/mL (08-19 @ 10:10)      PT/INR - ( 19 Aug 2019 10:10 )   PT: 11.5 sec;   INR: 1.00 ratio    PTT - ( 19 Aug 2019 10:10 )  PTT:29.6 sec      INTERPRETATION OF TELEMETRY:   ECG: SR, NSST-T abnl      RADIOLOGY & ADDITIONAL STUDIES:    X-ray:  reviewed by me.       ECHO FINDINGS:   < from: TTE Echo Complete w/Doppler (07.24.19 @ 09:21) >  PHYSICIAN INTERPRETATION:  Left Ventricle: Endocardial visualization was enhanced with intravenous   echo contrast. The left ventricular internal cavity size is normal. Left   ventricular wall thickness is mildly increased.  Global LV systolic function was normal. Left ventricular ejection   fraction, by visual estimation, is 60 to 65%.  Right Ventricle: The right ventricle was not well visualized.  Left Atrium: Normal left atrial size.  Right Atrium: The right atrium was not well visualized.  Pericardium: Trivial pericardial effusion is present.  Mitral Valve: Thickening of the anterior and posterior mitral valve   leaflets. There is severe mitral annular calcification. Peak transmitral   mean gradient equals 5.6 mmHg, calculated mitral valve area by pressure   half time equals 2.20 cm² consistent with mild mitral stenosis. Mitral   valve mean gradient is 5.6 mmHg consistent with mild mitral stenosis.   Trace mitral valve regurgitation is seen.  Tricuspid Valve: The tricuspid valve is not well seen. Adequate TR   velocity was not obtained to accurately assess RVSP.  Aortic Valve: Peak transaortic gradient equals 22.0 mmHg, mean   transaortic gradient equals 12.5 mmHg, the calculated aortic valve area   equals 1.07 cm² by the continuity equation consistent with mild aortic   stenosis. The aortic valve mean gradient is 12.5 mmHg consistent with   mild aortic stenosis. Trivial aortic valve regurgitation is seen.  Pulmonic Valve: The pulmonic valve was not well visualized.  Aorta: There is severe aortic root calcification.  Pulmonary Artery: The pulmonary artery is not well seen.  Venous: The pulmonary veins were not well visualized. The inferior vena   cava was normal sized, with respiratory size variation greater than 50%.       Summary:   1. Left ventricular ejection fraction, by visual estimation, is 60 to   65%.   2. Normal global left ventricular systolic function.   3. LV Ejection Fraction by Roman's Method with a biplane EF of 68 %.   4. Mildly increased LV wall thickness.   5. Normal left ventricular internal cavity size.   6. Trivial pericardial effusion.   7. Peak transmitral mean gradient equals 5.6 mmHg, calculated mitral   valve area by pressure half time equals 2.20 cm² consistent with mild   mitral stenosis.   8. Severe mitral annular calcification.   9. Thickening of the anterior and posterior mitral valve leaflets.  10. Endocardial visualization was enhanced with intravenous echo contrast.  11. Mitral valve mean gradient is 5.6 mmHg consistent with mild mitral   stenosis.  12. Peak transaortic gradient equals 22.0 mmHg, mean transaortic gradient   equals 12.5 mmHg, the calculated aortic valve area equals 1.07 cm² by the   continuity equation consistent with mild aortic stenosis.  13. The aortic valve mean gradient is 12.5 mmHg consistent with mild   aortic stenosis.  14. There is severe aortic root calcification.    MD Tremaine Electronically signed on 7/24/2019 at 12:24:53 PM    < end of copied text > [place] [facility]                                                       [facility]                                                        Office: [address] [address]                                                       Telephone: [phone]. Fax:[phone]                                                              CARDIOLOGY CONSULTATION NOTE                                                                                             Consult requested by:  Dr Harper  Reason for Consultation:  Heart failure   History obtained by: Patient and medical record   obtained: No    Chief complaint:    Patient is a 89y old  Male who presents with a chief complaint of CHF Exac (19 Aug 2019 13:17)    HPI:  Pt is a 89 yo M [facility]. PMH HTN, hypothyroidism, HFpEF, chronic lymphedema. Patient states for the past 2 days he has been having dyspnea with very little exertion and even at rest. He has been requiring O2 while at his nursing facility however normally he does not require O2. He denies any orthopnea or PND, denies chest pain, palpitations, light-heaeddness, dizziness, abd pain, nausea, vomiting, melena, hematochezia, dysuria.   He has no other complaints, his SOB has improved since he has been in the hospital. Patients diuretics were held during his last hospitalization in July 2019 for PNA and CHF exac and he was currently on Aldactone 50mg/day but no lasix.   In ED he was noted to have K 6.3, given lasix, insulin, dextrose, and bicarbonate. Of note he has had chronic lymphedema in b/l LE for the past 10-15 years. (19 Aug 2019 13:17)      REVIEW OF SYMPTOMS: Cardiovascular:  See HPI. No chest pain, No syncope, No palpitations, No dizziness,  No Paroxsymal nocturnal dyspnea;  Respiratory:  No cough,     Genitourinary:  No dysuria, no hematuria; Gastrointestinal:  No nausea, no vomiting. No diarrhea.  No abdominal pain. No dark color stool, no melena ; Neurological: No headache, no dizziness, no slurred speech;  Psychiatric: No agitation, no anxiety.  ALL OTHER REVIEW OF SYSTEMS ARE NEGATIVE.      ALLERGIES: Allergies  No Known Allergies  Intolerances      CURRENT MEDICATIONS:  carvedilol Oral Tab/Cap - Peds 12.5 milliGRAM(s) Oral two times a day  hydrALAZINE 25 milliGRAM(s) Oral two times a day  isosorbide   mononitrate ER Tablet (IMDUR) 30 milliGRAM(s) Oral daily  docusate sodium  aspirin enteric coated  heparin  Injectable  levothyroxine      HOME MEDICATIONS:      PAST MEDICAL HISTORY  Prostate enlargement  Hypothyroidism, unspecified type  HTN (hypertension)  CHF (congestive heart failure)      PAST SURGICAL HISTORY  History of prostate surgery  No significant past surgical history      FAMILY HISTORY:  No pertinent family history in first degree relatives      SOCIAL HISTORY:    CIGARETTES:   Denies   ALCOHOL: 2 drinks/night   DRUGS: Denies       Vital Signs Last 24 Hrs  T(C): 36.6 (19 Aug 2019 09:29), Max: 36.6 (19 Aug 2019 09:29)  T(F): 97.9 (19 Aug 2019 09:29), Max: 97.9 (19 Aug 2019 09:29)  HR: 70 (19 Aug 2019 09:29) (70 - 70)  BP: 156/78 (19 Aug 2019 09:29) (156/78 - 156/78)  RR: 21 (19 Aug 2019 09:29) (21 - 21)  SpO2: 100% (19 Aug 2019 09:29) (100% - 100%)      PHYSICAL EXAM:  Constitutional: Comfortable. No acute distress.   HEENT: Atraumatic and normocephalic, neck is supple. no JVD. No carotid bruit. PEERL   CNS: A&Ox3. No focal deficits. EOMI. Cranial nerves II-IX are intact.   Lymph Nodes: Cervical : Not palpable.  Respiratory: rales b/l, wheeze expiratory   Cardiovascular: S1S2 RRR. No murmur/rubs or gallop.  Gastrointestinal: Soft non-tender and non distended . +Bowel sounds. negative Zuniga's sign.  Extremities: No edema.   Psychiatric: Calm . no agitation.  Skin: No skin rash/ulcers visualized to face, hands or feet.      LABS:                   10.8   6.34  )-----------( 242      ( 19 Aug 2019 10:10 )             35.6     08-19  140  |  108<H>  |  35.0<H>  ----------------------------<  99  6.3<HH>   |  22.0  |  1.71<H>      Ca    8.7      19 Aug 2019 10:10  Mg     2.3     08-19      TPro  6.8  /  Alb  3.4  /  TBili  0.4  /  DBili  x   /  AST  18  /  ALT  15  /  AlkPhos  88  08-19      CARDIAC MARKERS ( 19 Aug 2019 10:10 )  x     / 0.02 ng/mL / x     / x     / x          ;p-BNP=Serum Pro-Brain Natriuretic Peptide: 3394 pg/mL (08-19 @ 10:10)      PT/INR - ( 19 Aug 2019 10:10 )   PT: 11.5 sec;   INR: 1.00 ratio    PTT - ( 19 Aug 2019 10:10 )  PTT:29.6 sec      INTERPRETATION OF TELEMETRY:   ECG: SR, NSST-T abnl      RADIOLOGY & ADDITIONAL STUDIES:    X-ray:  reviewed by me.       ECHO FINDINGS:   < from: TTE Echo Complete w/Doppler (07.24.19 @ 09:21) >  PHYSICIAN INTERPRETATION:  Left Ventricle: Endocardial visualization was enhanced with intravenous   echo contrast. The left ventricular internal cavity size is normal. Left   ventricular wall thickness is mildly increased.  Global LV systolic function was normal. Left ventricular ejection   fraction, by visual estimation, is 60 to 65%.  Right Ventricle: The right ventricle was not well visualized.  Left Atrium: Normal left atrial size.  Right Atrium: The right atrium was not well visualized.  Pericardium: Trivial pericardial effusion is present.  Mitral Valve: Thickening of the anterior and posterior mitral valve   leaflets. There is severe mitral annular calcification. Peak transmitral   mean gradient equals 5.6 mmHg, calculated mitral valve area by pressure   half time equals 2.20 cm² consistent with mild mitral stenosis. Mitral   valve mean gradient is 5.6 mmHg consistent with mild mitral stenosis.   Trace mitral valve regurgitation is seen.  Tricuspid Valve: The tricuspid valve is not well seen. Adequate TR   velocity was not obtained to accurately assess RVSP.  Aortic Valve: Peak transaortic gradient equals 22.0 mmHg, mean   transaortic gradient equals 12.5 mmHg, the calculated aortic valve area   equals 1.07 cm² by the continuity equation consistent with mild aortic   stenosis. The aortic valve mean gradient is 12.5 mmHg consistent with   mild aortic stenosis. Trivial aortic valve regurgitation is seen.  Pulmonic Valve: The pulmonic valve was not well visualized.  Aorta: There is severe aortic root calcification.  Pulmonary Artery: The pulmonary artery is not well seen.  Venous: The pulmonary veins were not well visualized. The inferior vena   cava was normal sized, with respiratory size variation greater than 50%.       Summary:   1. Left ventricular ejection fraction, by visual estimation, is 60 to   65%.   2. Normal global left ventricular systolic function.   3. LV Ejection Fraction by Rmoan's Method with a biplane EF of 68 %.   4. Mildly increased LV wall thickness.   5. Normal left ventricular internal cavity size.   6. Trivial pericardial effusion.   7. Peak transmitral mean gradient equals 5.6 mmHg, calculated mitral   valve area by pressure half time equals 2.20 cm² consistent with mild   mitral stenosis.   8. Severe mitral annular calcification.   9. Thickening of the anterior and posterior mitral valve leaflets.  10. Endocardial visualization was enhanced with intravenous echo contrast.  11. Mitral valve mean gradient is 5.6 mmHg consistent with mild mitral   stenosis.  12. Peak transaortic gradient equals 22.0 mmHg, mean transaortic gradient   equals 12.5 mmHg, the calculated aortic valve area equals 1.07 cm² by the   continuity equation consistent with mild aortic stenosis.  13. The aortic valve mean gradient is 12.5 mmHg consistent with mild   aortic stenosis.  14. There is severe aortic root calcification.    MD Tremaine Electronically signed on 7/24/2019 at 12:24:53 PM    < end of copied text > [place] [facility]                                                       [facility]                                                        Office: [address] [address]                                                       Telephone: [phone]. Fax:[phone]                                                              CARDIOLOGY CONSULTATION NOTE                                                                                             Consult requested by:  Dr Harper  Reason for Consultation:  Heart failure   History obtained by: Patient and medical record   obtained: No    Chief complaint:    Patient is a 89y old  Male who presents with a chief complaint of CHF Exac (19 Aug 2019 13:17)    HPI:  Pt is a 89 yo M presenting from [facility] for dyspnea. PMH HTN, hypothyroidism, HFpEF, chronic lymphedema. Patient states for the past 2 days he has been having dyspnea with very little exertion and even at rest. He has been requiring O2 while at his nursing facility however normally he does not require O2. He denies any orthopnea or PND, denies chest pain, palpitations, light-heaeddness, dizziness, abd pain, nausea, vomiting, melena, hematochezia, dysuria.   He has no other complaints, his SOB has improved since he has been in the hospital. Patients diuretics were held during his last hospitalization in July 2019 for PNA and CHF exac and he was currently on Aldactone 50mg/day but no lasix.   In ED he was noted to have K 6.3, given lasix, insulin, dextrose, and bicarbonate. Of note he has had chronic lymphedema in b/l LE for the past 10-15 years. (19 Aug 2019 13:17)      REVIEW OF SYMPTOMS: Cardiovascular:  See HPI. No chest pain, No syncope, No palpitations, No dizziness,  No Paroxsymal nocturnal dyspnea;  Respiratory:  No cough,     Genitourinary:  No dysuria, no hematuria; Gastrointestinal:  No nausea, no vomiting. No diarrhea.  No abdominal pain. No dark color stool, no melena ; Neurological: No headache, no dizziness, no slurred speech;  Psychiatric: No agitation, no anxiety.  ALL OTHER REVIEW OF SYSTEMS ARE NEGATIVE.      ALLERGIES: Allergies  No Known Allergies  Intolerances      CURRENT MEDICATIONS:  carvedilol Oral Tab/Cap - Peds 12.5 milliGRAM(s) Oral two times a day  hydrALAZINE 25 milliGRAM(s) Oral two times a day  isosorbide   mononitrate ER Tablet (IMDUR) 30 milliGRAM(s) Oral daily  docusate sodium  aspirin enteric coated  heparin  Injectable  levothyroxine      HOME MEDICATIONS:      PAST MEDICAL HISTORY  Prostate enlargement  Hypothyroidism, unspecified type  HTN (hypertension)  CHF (congestive heart failure)      PAST SURGICAL HISTORY  History of prostate surgery  No significant past surgical history      FAMILY HISTORY:  No pertinent family history in first degree relatives      SOCIAL HISTORY:    CIGARETTES:   Denies   ALCOHOL: 2 drinks/night   DRUGS: Denies       Vital Signs Last 24 Hrs  T(C): 36.6 (19 Aug 2019 09:29), Max: 36.6 (19 Aug 2019 09:29)  T(F): 97.9 (19 Aug 2019 09:29), Max: 97.9 (19 Aug 2019 09:29)  HR: 70 (19 Aug 2019 09:29) (70 - 70)  BP: 156/78 (19 Aug 2019 09:29) (156/78 - 156/78)  RR: 21 (19 Aug 2019 09:29) (21 - 21)  SpO2: 100% (19 Aug 2019 09:29) (100% - 100%)      PHYSICAL EXAM:  Constitutional: Comfortable. No acute distress.   HEENT: Atraumatic and normocephalic, neck is supple. no JVD. No carotid bruit. PEERL   CNS: A&Ox3. No focal deficits. EOMI. Cranial nerves II-IX are intact.   Lymph Nodes: Cervical : Not palpable.  Respiratory: rales b/l, wheeze expiratory   Cardiovascular: S1S2 RRR. No murmur/rubs or gallop.   Gastrointestinal: Soft non-tender and distended . +Bowel sounds. negative Zuniga's sign.  Extremities: No edema.   Psychiatric: Calm . no agitation.  Skin: No skin rash/ulcers visualized to face, hands or feet.      LABS:                   10.8   6.34  )-----------( 242      ( 19 Aug 2019 10:10 )             35.6     08-19  140  |  108<H>  |  35.0<H>  ----------------------------<  99  6.3<HH>   |  22.0  |  1.71<H>      Ca    8.7      19 Aug 2019 10:10  Mg     2.3     08-19      TPro  6.8  /  Alb  3.4  /  TBili  0.4  /  DBili  x   /  AST  18  /  ALT  15  /  AlkPhos  88  08-19      CARDIAC MARKERS ( 19 Aug 2019 10:10 )  x     / 0.02 ng/mL / x     / x     / x          ;p-BNP=Serum Pro-Brain Natriuretic Peptide: 3394 pg/mL (08-19 @ 10:10)      PT/INR - ( 19 Aug 2019 10:10 )   PT: 11.5 sec;   INR: 1.00 ratio    PTT - ( 19 Aug 2019 10:10 )  PTT:29.6 sec      INTERPRETATION OF TELEMETRY:   ECG: SR, NSST-T abnl      RADIOLOGY & ADDITIONAL STUDIES:    X-ray:  reviewed by me.       ECHO FINDINGS:   < from: TTE Echo Complete w/Doppler (07.24.19 @ 09:21) >  PHYSICIAN INTERPRETATION:  Left Ventricle: Endocardial visualization was enhanced with intravenous   echo contrast. The left ventricular internal cavity size is normal. Left   ventricular wall thickness is mildly increased.  Global LV systolic function was normal. Left ventricular ejection   fraction, by visual estimation, is 60 to 65%.  Right Ventricle: The right ventricle was not well visualized.  Left Atrium: Normal left atrial size.  Right Atrium: The right atrium was not well visualized.  Pericardium: Trivial pericardial effusion is present.  Mitral Valve: Thickening of the anterior and posterior mitral valve   leaflets. There is severe mitral annular calcification. Peak transmitral   mean gradient equals 5.6 mmHg, calculated mitral valve area by pressure   half time equals 2.20 cm² consistent with mild mitral stenosis. Mitral   valve mean gradient is 5.6 mmHg consistent with mild mitral stenosis.   Trace mitral valve regurgitation is seen.  Tricuspid Valve: The tricuspid valve is not well seen. Adequate TR   velocity was not obtained to accurately assess RVSP.  Aortic Valve: Peak transaortic gradient equals 22.0 mmHg, mean   transaortic gradient equals 12.5 mmHg, the calculated aortic valve area   equals 1.07 cm² by the continuity equation consistent with mild aortic   stenosis. The aortic valve mean gradient is 12.5 mmHg consistent with   mild aortic stenosis. Trivial aortic valve regurgitation is seen.  Pulmonic Valve: The pulmonic valve was not well visualized.  Aorta: There is severe aortic root calcification.  Pulmonary Artery: The pulmonary artery is not well seen.  Venous: The pulmonary veins were not well visualized. The inferior vena   cava was normal sized, with respiratory size variation greater than 50%.       Summary:   1. Left ventricular ejection fraction, by visual estimation, is 60 to   65%.   2. Normal global left ventricular systolic function.   3. LV Ejection Fraction by Roman's Method with a biplane EF of 68 %.   4. Mildly increased LV wall thickness.   5. Normal left ventricular internal cavity size.   6. Trivial pericardial effusion.   7. Peak transmitral mean gradient equals 5.6 mmHg, calculated mitral   valve area by pressure half time equals 2.20 cm² consistent with mild   mitral stenosis.   8. Severe mitral annular calcification.   9. Thickening of the anterior and posterior mitral valve leaflets.  10. Endocardial visualization was enhanced with intravenous echo contrast.  11. Mitral valve mean gradient is 5.6 mmHg consistent with mild mitral   stenosis.  12. Peak transaortic gradient equals 22.0 mmHg, mean transaortic gradient   equals 12.5 mmHg, the calculated aortic valve area equals 1.07 cm² by the   continuity equation consistent with mild aortic stenosis.  13. The aortic valve mean gradient is 12.5 mmHg consistent with mild   aortic stenosis.  14. There is severe aortic root calcification.    MD Tremaine Electronically signed on 7/24/2019 at 12:24:53 PM    < end of copied text > [place] [facility]                                                       [facility]                                                        Office: [address] [address]                                                       Telephone: [phone]. Fax:[phone]                                                              CARDIOLOGY CONSULTATION NOTE                                                                                             Consult requested by:  Dr Harper  Reason for Consultation:  Heart failure   History obtained by: Patient and medical record   obtained: No    Chief complaint:    Patient is a 89y old  Male who presents with a chief complaint of CHF Exac (19 Aug 2019 13:17)    HPI:  Pt is a 89 yo M presenting from [facility] for dyspnea. PMH HTN, hypothyroidism, HFpEF, chronic lymphedema. Patient states for the past 2 days he has been having dyspnea with very little exertion and even at rest. He has been requiring O2 while at his nursing facility however normally he does not require O2. He denies any orthopnea or PND, denies chest pain, palpitations, light-heaeddness, dizziness, abd pain, nausea, vomiting, melena, hematochezia, dysuria.   He has no other complaints, his SOB has improved since he has been in the hospital. Patients diuretics were held during his last hospitalization in July 2019 for PNA and CHF exac and he was currently on Aldactone 50mg/day but no lasix.   In ED he was noted to have K 6.3, given lasix, insulin, dextrose, and bicarbonate. Of note he has had chronic lymphedema in b/l LE for the past 10-15 years. (19 Aug 2019 13:17)      REVIEW OF SYMPTOMS: Cardiovascular:  See HPI. No chest pain, No syncope, No palpitations, No dizziness,  No Paroxsymal nocturnal dyspnea;  Respiratory:  No cough,     Genitourinary:  No dysuria, no hematuria; Gastrointestinal:  No nausea, no vomiting. No diarrhea.  No abdominal pain. No dark color stool, no melena ; Neurological: No headache, no dizziness, no slurred speech;  Psychiatric: No agitation, no anxiety.  ALL OTHER REVIEW OF SYSTEMS ARE NEGATIVE.      ALLERGIES: Allergies  No Known Allergies  Intolerances      CURRENT MEDICATIONS:  carvedilol Oral Tab/Cap - Peds 12.5 milliGRAM(s) Oral two times a day  hydrALAZINE 25 milliGRAM(s) Oral two times a day  isosorbide   mononitrate ER Tablet (IMDUR) 30 milliGRAM(s) Oral daily  docusate sodium  aspirin enteric coated  heparin  Injectable  levothyroxine      HOME MEDICATIONS:      PAST MEDICAL HISTORY  Prostate enlargement  Hypothyroidism, unspecified type  HTN (hypertension)  CHF (congestive heart failure)      PAST SURGICAL HISTORY  History of prostate surgery  No significant past surgical history      FAMILY HISTORY:  No pertinent family history in first degree relatives      SOCIAL HISTORY:    CIGARETTES:   Denies   ALCOHOL: 2 drinks/night   DRUGS: Denies       Vital Signs Last 24 Hrs  T(C): 36.6 (19 Aug 2019 09:29), Max: 36.6 (19 Aug 2019 09:29)  T(F): 97.9 (19 Aug 2019 09:29), Max: 97.9 (19 Aug 2019 09:29)  HR: 70 (19 Aug 2019 09:29) (70 - 70)  BP: 156/78 (19 Aug 2019 09:29) (156/78 - 156/78)  RR: 21 (19 Aug 2019 09:29) (21 - 21)  SpO2: 100% (19 Aug 2019 09:29) (100% - 100%)      PHYSICAL EXAM:  Constitutional: Comfortable. No acute distress.   HEENT: Atraumatic and normocephalic, neck is supple. + JVD. No carotid bruit. PEERL   CNS: A&Ox3. No focal deficits. EOMI. Cranial nerves II-IX are intact.   Lymph Nodes: Cervical : Not palpable.  Respiratory: rales b/l, wheeze expiratory   Cardiovascular: S1S2 RRR. No murmur/rubs or gallop.   Gastrointestinal: Soft non-tender and distended . +Bowel sounds. negative Zuniga's sign.  Extremities: chroinc LE edema. New B/L LE edema extending into upper legs, hips, adb.   Psychiatric: Calm . no agitation.  Skin: No skin rash/ulcers visualized to face, hands or feet.      LABS:                   10.8   6.34  )-----------( 242      ( 19 Aug 2019 10:10 )             35.6     08-19  140  |  108<H>  |  35.0<H>  ----------------------------<  99  6.3<HH>   |  22.0  |  1.71<H>      Ca    8.7      19 Aug 2019 10:10  Mg     2.3     08-19      TPro  6.8  /  Alb  3.4  /  TBili  0.4  /  DBili  x   /  AST  18  /  ALT  15  /  AlkPhos  88  08-19      CARDIAC MARKERS ( 19 Aug 2019 10:10 )  x     / 0.02 ng/mL / x     / x     / x          ;p-BNP=Serum Pro-Brain Natriuretic Peptide: 3394 pg/mL (08-19 @ 10:10)      PT/INR - ( 19 Aug 2019 10:10 )   PT: 11.5 sec;   INR: 1.00 ratio    PTT - ( 19 Aug 2019 10:10 )  PTT:29.6 sec      INTERPRETATION OF TELEMETRY:   ECG: SR, NSST-T abnl      RADIOLOGY & ADDITIONAL STUDIES:    X-ray:  reviewed by me.       ECHO FINDINGS:   < from: TTE Echo Complete w/Doppler (07.24.19 @ 09:21) >  PHYSICIAN INTERPRETATION:  Left Ventricle: Endocardial visualization was enhanced with intravenous   echo contrast. The left ventricular internal cavity size is normal. Left   ventricular wall thickness is mildly increased.  Global LV systolic function was normal. Left ventricular ejection   fraction, by visual estimation, is 60 to 65%.  Right Ventricle: The right ventricle was not well visualized.  Left Atrium: Normal left atrial size.  Right Atrium: The right atrium was not well visualized.  Pericardium: Trivial pericardial effusion is present.  Mitral Valve: Thickening of the anterior and posterior mitral valve   leaflets. There is severe mitral annular calcification. Peak transmitral   mean gradient equals 5.6 mmHg, calculated mitral valve area by pressure   half time equals 2.20 cm² consistent with mild mitral stenosis. Mitral   valve mean gradient is 5.6 mmHg consistent with mild mitral stenosis.   Trace mitral valve regurgitation is seen.  Tricuspid Valve: The tricuspid valve is not well seen. Adequate TR   velocity was not obtained to accurately assess RVSP.  Aortic Valve: Peak transaortic gradient equals 22.0 mmHg, mean   transaortic gradient equals 12.5 mmHg, the calculated aortic valve area   equals 1.07 cm² by the continuity equation consistent with mild aortic   stenosis. The aortic valve mean gradient is 12.5 mmHg consistent with   mild aortic stenosis. Trivial aortic valve regurgitation is seen.  Pulmonic Valve: The pulmonic valve was not well visualized.  Aorta: There is severe aortic root calcification.  Pulmonary Artery: The pulmonary artery is not well seen.  Venous: The pulmonary veins were not well visualized. The inferior vena   cava was normal sized, with respiratory size variation greater than 50%.       Summary:   1. Left ventricular ejection fraction, by visual estimation, is 60 to   65%.   2. Normal global left ventricular systolic function.   3. LV Ejection Fraction by Roman's Method with a biplane EF of 68 %.   4. Mildly increased LV wall thickness.   5. Normal left ventricular internal cavity size.   6. Trivial pericardial effusion.   7. Peak transmitral mean gradient equals 5.6 mmHg, calculated mitral   valve area by pressure half time equals 2.20 cm² consistent with mild   mitral stenosis.   8. Severe mitral annular calcification.   9. Thickening of the anterior and posterior mitral valve leaflets.  10. Endocardial visualization was enhanced with intravenous echo contrast.  11. Mitral valve mean gradient is 5.6 mmHg consistent with mild mitral   stenosis.  12. Peak transaortic gradient equals 22.0 mmHg, mean transaortic gradient   equals 12.5 mmHg, the calculated aortic valve area equals 1.07 cm² by the   continuity equation consistent with mild aortic stenosis.  13. The aortic valve mean gradient is 12.5 mmHg consistent with mild   aortic stenosis.  14. There is severe aortic root calcification.    MD Tremaine Electronically signed on 7/24/2019 at 12:24:53 PM    < end of copied text >

## 2019-08-19 NOTE — H&P ADULT - NSHPPHYSICALEXAM_GEN_ALL_CORE
T(C): 36.6 (08-19-19 @ 09:29), Max: 36.6 (08-19-19 @ 09:29)  HR: 70 (08-19-19 @ 09:29) (70 - 70)  BP: 156/78 (08-19-19 @ 09:29) (156/78 - 156/78)  RR: 21 (08-19-19 @ 09:29) (21 - 21)  SpO2: 100% (08-19-19 @ 09:29) (100% - 100%)  Wt(kg): --    Physical Exam:   GENERAL: well-groomed, well-developed, NAD  HEENT: head NC/AT; EOM intact, conjunctiva & sclera clear; hearing grossly intact, moist mucous membranes  NECK: supple, no JVD  RESPIRATORY: rales at lung bases b/l  CARDIOVASCULAR: S1&S2, RRR, no murmurs or gallops  ABDOMEN: soft, non-tender, non-distended, + Bowel sounds x4 quadrants, no guarding, rebound or rigidity  MUSCULOSKELETAL:  b/l LE pitting edema 4+  LYMPH: no cervical lymphadenopathy  VASCULAR: Radial pulses 2+ bilaterally, no varicose veins   SKIN: warm and dry, color normal  NEUROLOGIC: AA&O X3, CN2-12 intact w/ no focal deficits, no sensory loss, motor Strength 5/5 in UE & LE B/L  Psych: Normal mood and affect, normal behavior

## 2019-08-19 NOTE — ED PROVIDER NOTE - CLINICAL SUMMARY MEDICAL DECISION MAKING FREE TEXT BOX
labs and imaging reviewed. Pt with acute chf exacerbation and hyperkalemia. Pt given lasix, insulin, d50, sodium bicarb and kayexalate.  Case d/w Dr ross and will admit for further management.

## 2019-08-19 NOTE — ED ADULT NURSE NOTE - NSIMPLEMENTINTERV_GEN_ALL_ED
normal... Implemented All Fall with Harm Risk Interventions:  [place] to call system. Call bell, personal items and telephone within reach. Instruct patient to call for assistance. Room bathroom lighting operational. Non-slip footwear when patient is off stretcher. Physically safe environment: no spills, clutter or unnecessary equipment. Stretcher in lowest position, wheels locked, appropriate side rails in place. Provide visual cue, wrist band, yellow gown, etc. Monitor gait and stability. Monitor for mental status changes and reorient to person, place, and time. Review medications for side effects contributing to fall risk. Reinforce activity limits and safety measures with patient and family. Provide visual clues: red socks.

## 2019-08-19 NOTE — ED ADULT NURSE NOTE - CHIEF COMPLAINT QUOTE
[facility] of [facility] trouble breathing x1 week. does not usually use home O2, has been for past couple days. arrives on 4L NC.

## 2019-08-20 LAB
ANION GAP SERPL CALC-SCNC: 10 MMOL/L — SIGNIFICANT CHANGE UP (ref 5–17)
ANISOCYTOSIS BLD QL: SLIGHT — SIGNIFICANT CHANGE UP
BASOPHILS # BLD AUTO: 0.04 K/UL — SIGNIFICANT CHANGE UP (ref 0–0.2)
BASOPHILS NFR BLD AUTO: 0.9 % — SIGNIFICANT CHANGE UP (ref 0–2)
BUN SERPL-MCNC: 43 MG/DL — HIGH (ref 8–20)
CALCIUM SERPL-MCNC: 8.8 MG/DL — SIGNIFICANT CHANGE UP (ref 8.6–10.2)
CHLORIDE SERPL-SCNC: 107 MMOL/L — SIGNIFICANT CHANGE UP (ref 98–107)
CO2 SERPL-SCNC: 23 MMOL/L — SIGNIFICANT CHANGE UP (ref 22–29)
CREAT SERPL-MCNC: 1.97 MG/DL — HIGH (ref 0.5–1.3)
EOSINOPHIL # BLD AUTO: 0.16 K/UL — SIGNIFICANT CHANGE UP (ref 0–0.5)
EOSINOPHIL NFR BLD AUTO: 3.5 % — SIGNIFICANT CHANGE UP (ref 0–6)
GIANT PLATELETS BLD QL SMEAR: PRESENT — SIGNIFICANT CHANGE UP
GLUCOSE SERPL-MCNC: 86 MG/DL — SIGNIFICANT CHANGE UP (ref 70–115)
HCT VFR BLD CALC: 32.5 % — LOW (ref 39–50)
HGB BLD-MCNC: 9.7 G/DL — LOW (ref 13–17)
LYMPHOCYTES # BLD AUTO: 1.01 K/UL — SIGNIFICANT CHANGE UP (ref 1–3.3)
LYMPHOCYTES # BLD AUTO: 21.7 % — SIGNIFICANT CHANGE UP (ref 13–44)
MACROCYTES BLD QL: SLIGHT — SIGNIFICANT CHANGE UP
MANUAL SMEAR VERIFICATION: SIGNIFICANT CHANGE UP
MCHC RBC-ENTMCNC: 29.8 GM/DL — LOW (ref 32–36)
MCHC RBC-ENTMCNC: 30.5 PG — SIGNIFICANT CHANGE UP (ref 27–34)
MCV RBC AUTO: 102.2 FL — HIGH (ref 80–100)
MONOCYTES # BLD AUTO: 0.57 K/UL — SIGNIFICANT CHANGE UP (ref 0–0.9)
MONOCYTES NFR BLD AUTO: 12.2 % — SIGNIFICANT CHANGE UP (ref 2–14)
MRSA PCR RESULT.: DETECTED
NEUTROPHILS # BLD AUTO: 2.86 K/UL — SIGNIFICANT CHANGE UP (ref 1.8–7.4)
NEUTROPHILS NFR BLD AUTO: 61.7 % — SIGNIFICANT CHANGE UP (ref 43–77)
OVALOCYTES BLD QL SMEAR: SLIGHT — SIGNIFICANT CHANGE UP
PLAT MORPH BLD: NORMAL — SIGNIFICANT CHANGE UP
PLATELET # BLD AUTO: 211 K/UL — SIGNIFICANT CHANGE UP (ref 150–400)
POIKILOCYTOSIS BLD QL AUTO: SLIGHT — SIGNIFICANT CHANGE UP
POTASSIUM SERPL-MCNC: 5.5 MMOL/L — HIGH (ref 3.5–5.3)
POTASSIUM SERPL-SCNC: 5.5 MMOL/L — HIGH (ref 3.5–5.3)
RBC # BLD: 3.18 M/UL — LOW (ref 4.2–5.8)
RBC # FLD: 13.9 % — SIGNIFICANT CHANGE UP (ref 10.3–14.5)
RBC BLD AUTO: ABNORMAL
S AUREUS DNA NOSE QL NAA+PROBE: DETECTED
SODIUM SERPL-SCNC: 140 MMOL/L — SIGNIFICANT CHANGE UP (ref 135–145)
WBC # BLD: 4.64 K/UL — SIGNIFICANT CHANGE UP (ref 3.8–10.5)
WBC # FLD AUTO: 4.64 K/UL — SIGNIFICANT CHANGE UP (ref 3.8–10.5)

## 2019-08-20 PROCEDURE — 99232 SBSQ HOSP IP/OBS MODERATE 35: CPT

## 2019-08-20 RX ORDER — MUPIROCIN 20 MG/G
1 OINTMENT TOPICAL
Refills: 0 | Status: COMPLETED | OUTPATIENT
Start: 2019-08-20 | End: 2019-08-25

## 2019-08-20 RX ADMIN — HEPARIN SODIUM 5000 UNIT(S): 5000 INJECTION INTRAVENOUS; SUBCUTANEOUS at 20:34

## 2019-08-20 RX ADMIN — CHLORHEXIDINE GLUCONATE 1 APPLICATION(S): 213 SOLUTION TOPICAL at 10:31

## 2019-08-20 RX ADMIN — Medication 325 MILLIGRAM(S): at 10:30

## 2019-08-20 RX ADMIN — Medication 50 MICROGRAM(S): at 05:34

## 2019-08-20 RX ADMIN — Medication 25 MILLIGRAM(S): at 05:34

## 2019-08-20 RX ADMIN — CARVEDILOL PHOSPHATE 12.5 MILLIGRAM(S): 80 CAPSULE, EXTENDED RELEASE ORAL at 16:51

## 2019-08-20 RX ADMIN — ISOSORBIDE MONONITRATE 30 MILLIGRAM(S): 60 TABLET, EXTENDED RELEASE ORAL at 10:30

## 2019-08-20 RX ADMIN — Medication 100 MILLIGRAM(S): at 05:34

## 2019-08-20 RX ADMIN — Medication 40 MILLIGRAM(S): at 05:33

## 2019-08-20 RX ADMIN — MUPIROCIN 1 APPLICATION(S): 20 OINTMENT TOPICAL at 18:09

## 2019-08-20 RX ADMIN — Medication 25 MILLIGRAM(S): at 16:51

## 2019-08-20 RX ADMIN — HEPARIN SODIUM 5000 UNIT(S): 5000 INJECTION INTRAVENOUS; SUBCUTANEOUS at 05:34

## 2019-08-20 RX ADMIN — Medication 100 MILLIGRAM(S): at 13:46

## 2019-08-20 RX ADMIN — Medication 100 MILLIGRAM(S): at 20:33

## 2019-08-20 RX ADMIN — CARVEDILOL PHOSPHATE 12.5 MILLIGRAM(S): 80 CAPSULE, EXTENDED RELEASE ORAL at 05:34

## 2019-08-20 RX ADMIN — HEPARIN SODIUM 5000 UNIT(S): 5000 INJECTION INTRAVENOUS; SUBCUTANEOUS at 13:46

## 2019-08-20 NOTE — PROGRESS NOTE ADULT - SUBJECTIVE AND OBJECTIVE BOX
TARA LUCIE  ----------------------------------------  The patient was seen and evaluated for heart failure.  The patient is in no acute distress.  Denied any chest pain, palpitations, or abdominal pain.  Reports improvement in the dyspnea.    Vital Signs Last 24 Hrs  T(C): 36.6 (20 Aug 2019 08:04), Max: 36.6 (19 Aug 2019 22:12)  T(F): 97.9 (20 Aug 2019 08:04), Max: 97.9 (19 Aug 2019 22:12)  HR: 65 (20 Aug 2019 08:04) (62 - 76)  BP: 107/62 (20 Aug 2019 08:04) (104/61 - 151/65)  BP(mean): --  RR: 18 (20 Aug 2019 08:04) (18 - 21)  SpO2: 98% (20 Aug 2019 08:04) (98% - 100%)    PHYSICAL EXAMINATION:  ----------------------------------------  General appearance: No acute distress, Awake, Alert  HEENT: Normocephalic, Atraumatic, Conjunctiva clear, EOMI  Neck: Supple, No JVD, No tenderness  Lungs: Breath sound equal bilaterally, No wheezes, No rales  Cardiovascular: S1S2, Regular rhythm  Abdomen: Soft, Nontender, Nondistended, No guarding/rebound, Positive bowel sounds  Extremities: No clubbing, No cyanosis, No calf tenderness, Lower extremity edema and redness  Neuro: Strength equal bilaterally, No tremors  Psychiatric: Appropriate mood, Normal affect    LABORATORY STUDIES:  ----------------------------------------             9.7    4.64  )-----------( 211      ( 20 Aug 2019 07:51 )             32.5     08-20    140  |  107  |  43.0<H>  ----------------------------<  86  5.5<H>   |  23.0  |  1.97<H>    Ca    8.8      20 Aug 2019 07:51  Mg     2.3     08-19    TPro  6.8  /  Alb  3.4  /  TBili  0.4  /  DBili  x   /  AST  18  /  ALT  15  /  AlkPhos  88  08-19    LIVER FUNCTIONS - ( 19 Aug 2019 10:10 )  Alb: 3.4 g/dL / Pro: 6.8 g/dL / ALK PHOS: 88 U/L / ALT: 15 U/L / AST: 18 U/L / GGT: x           PT/INR - ( 19 Aug 2019 10:10 )   PT: 11.5 sec;   INR: 1.00 ratio    PTT - ( 19 Aug 2019 10:10 )  PTT:29.6 sec    CARDIAC MARKERS ( 19 Aug 2019 10:10 )  x     / 0.02 ng/mL / x     / x     / x        MEDICATIONS  (STANDING):  aspirin enteric coated 325 milliGRAM(s) Oral daily  carvedilol 12.5 milliGRAM(s) Oral every 12 hours  chlorhexidine 2% Cloths 1 Application(s) Topical daily  docusate sodium 100 milliGRAM(s) Oral three times a day  furosemide   Injectable 40 milliGRAM(s) IV Push daily  heparin  Injectable 5000 Unit(s) SubCutaneous every 8 hours  hydrALAZINE 25 milliGRAM(s) Oral two times a day  isosorbide   mononitrate ER Tablet (IMDUR) 30 milliGRAM(s) Oral daily  levothyroxine 50 MICROGram(s) Oral daily    MEDICATIONS  (PRN):  senna 2 Tablet(s) Oral at bedtime PRN Constipation      ASSESSMENT / PLAN:  ----------------------------------------  89M with a history of hypertension, hypothyroidism, lymphedema, and heart failure referred to the hospital from [facility] with dyspnea due to heart failure.    Acute on chronic diastolic heart failure - On intravenous furosemide for diuresis. On carvedilol as well. Cardiology consultation noted.    Chronic kidney disease III / Hyperkalemia - Spironolactone held on admission. Improved on repeat laboratory studies.    Hypothyroidism - On levothyroxine.    Hypertension - Close blood pressure monitoring.    Anemia - Monitoring CBC.    MOLST noetd. DNR/DNI

## 2019-08-21 PROCEDURE — 99232 SBSQ HOSP IP/OBS MODERATE 35: CPT

## 2019-08-21 RX ADMIN — CHLORHEXIDINE GLUCONATE 1 APPLICATION(S): 213 SOLUTION TOPICAL at 11:08

## 2019-08-21 RX ADMIN — Medication 25 MILLIGRAM(S): at 17:23

## 2019-08-21 RX ADMIN — Medication 50 MICROGRAM(S): at 05:28

## 2019-08-21 RX ADMIN — CARVEDILOL PHOSPHATE 12.5 MILLIGRAM(S): 80 CAPSULE, EXTENDED RELEASE ORAL at 17:23

## 2019-08-21 RX ADMIN — Medication 100 MILLIGRAM(S): at 11:07

## 2019-08-21 RX ADMIN — Medication 40 MILLIGRAM(S): at 05:29

## 2019-08-21 RX ADMIN — Medication 100 MILLIGRAM(S): at 05:28

## 2019-08-21 RX ADMIN — MUPIROCIN 1 APPLICATION(S): 20 OINTMENT TOPICAL at 05:29

## 2019-08-21 RX ADMIN — MUPIROCIN 1 APPLICATION(S): 20 OINTMENT TOPICAL at 17:23

## 2019-08-21 RX ADMIN — Medication 100 MILLIGRAM(S): at 21:23

## 2019-08-21 RX ADMIN — HEPARIN SODIUM 5000 UNIT(S): 5000 INJECTION INTRAVENOUS; SUBCUTANEOUS at 13:11

## 2019-08-21 RX ADMIN — Medication 325 MILLIGRAM(S): at 11:07

## 2019-08-21 RX ADMIN — HEPARIN SODIUM 5000 UNIT(S): 5000 INJECTION INTRAVENOUS; SUBCUTANEOUS at 21:22

## 2019-08-21 RX ADMIN — HEPARIN SODIUM 5000 UNIT(S): 5000 INJECTION INTRAVENOUS; SUBCUTANEOUS at 05:29

## 2019-08-21 RX ADMIN — ISOSORBIDE MONONITRATE 30 MILLIGRAM(S): 60 TABLET, EXTENDED RELEASE ORAL at 11:07

## 2019-08-21 RX ADMIN — Medication 25 MILLIGRAM(S): at 05:28

## 2019-08-21 RX ADMIN — CARVEDILOL PHOSPHATE 12.5 MILLIGRAM(S): 80 CAPSULE, EXTENDED RELEASE ORAL at 05:28

## 2019-08-21 NOTE — PROGRESS NOTE ADULT - SUBJECTIVE AND OBJECTIVE BOX
TARA LUCIE  ----------------------------------------  The patient was seen and evaluated for heart failure.  The patient is in no acute distress.  Denied any chest pain, palpitations, or abdominal pain.  Reports improvement in dyspnea.    Vital Signs Last 24 Hrs  T(C): 36.5 (21 Aug 2019 07:47), Max: 36.6 (20 Aug 2019 20:31)  T(F): 97.7 (21 Aug 2019 07:47), Max: 97.9 (20 Aug 2019 20:31)  HR: 67 (21 Aug 2019 07:47) (67 - 76)  BP: 108/56 (21 Aug 2019 07:47) (108/54 - 133/55)  BP(mean): --  RR: 18 (21 Aug 2019 08:00) (18 - 20)  SpO2: 99% (21 Aug 2019 08:00) (94% - 99%)    PHYSICAL EXAMINATION:  ----------------------------------------  General appearance: No acute distress, Awake, Alert  HEENT: Normocephalic, Atraumatic, Conjunctiva clear, EOMI  Neck: Supple, No JVD, No tenderness  Lungs: Breath sound equal bilaterally, No wheezes, No rales  Cardiovascular: S1S2, Regular rhythm  Abdomen: Soft, Nontender, Nondistended, No guarding/rebound, Positive bowel sounds  Extremities: No clubbing, No cyanosis, No calf tenderness, Lower extremity edema and redness  Neuro: Strength equal bilaterally, No tremors  Psychiatric: Appropriate mood, Normal affect    LABORATORY STUDIES:  ----------------------------------------             9.7    4.64  )-----------( 211      ( 20 Aug 2019 07:51 )             32.5     08-20    140  |  107  |  43.0<H>  ----------------------------<  86  5.5<H>   |  23.0  |  1.97<H>    Ca    8.8      20 Aug 2019 07:51    MEDICATIONS  (STANDING):  aspirin enteric coated 325 milliGRAM(s) Oral daily  carvedilol 12.5 milliGRAM(s) Oral every 12 hours  chlorhexidine 2% Cloths 1 Application(s) Topical daily  docusate sodium 100 milliGRAM(s) Oral three times a day  furosemide   Injectable 40 milliGRAM(s) IV Push daily  heparin  Injectable 5000 Unit(s) SubCutaneous every 8 hours  hydrALAZINE 25 milliGRAM(s) Oral two times a day  isosorbide   mononitrate ER Tablet (IMDUR) 30 milliGRAM(s) Oral daily  levothyroxine 50 MICROGram(s) Oral daily  mupirocin 2% Ointment 1 Application(s) Both Nostrils two times a day    MEDICATIONS  (PRN):  senna 2 Tablet(s) Oral at bedtime PRN Constipation      ASSESSMENT / PLAN:  ----------------------------------------  89M with a history of hypertension, hypothyroidism, lymphedema, and heart failure referred to the hospital from [facility] with dyspnea due to heart failure.    Acute on chronic diastolic heart failure - On intravenous furosemide for diuresis. On carvedilol as well. Cardiology for follow up.    Chronic kidney disease III / Hyperkalemia - Spironolactone held on admission. Improved on repeat laboratory studies.    Hypothyroidism - On levothyroxine.    Hypertension - Close blood pressure monitoring. On carvedilol, hydralazine, and furosemide.    Anemia - Monitoring CBC.    MOLST noted. DNR/DNI

## 2019-08-22 LAB
ANION GAP SERPL CALC-SCNC: 11 MMOL/L — SIGNIFICANT CHANGE UP (ref 5–17)
BUN SERPL-MCNC: 53 MG/DL — HIGH (ref 8–20)
CALCIUM SERPL-MCNC: 8.3 MG/DL — LOW (ref 8.6–10.2)
CHLORIDE SERPL-SCNC: 106 MMOL/L — SIGNIFICANT CHANGE UP (ref 98–107)
CO2 SERPL-SCNC: 22 MMOL/L — SIGNIFICANT CHANGE UP (ref 22–29)
CREAT SERPL-MCNC: 1.92 MG/DL — HIGH (ref 0.5–1.3)
GLUCOSE SERPL-MCNC: 98 MG/DL — SIGNIFICANT CHANGE UP (ref 70–115)
HCT VFR BLD CALC: 30.6 % — LOW (ref 39–50)
HGB BLD-MCNC: 9.2 G/DL — LOW (ref 13–17)
MCHC RBC-ENTMCNC: 30.1 GM/DL — LOW (ref 32–36)
MCHC RBC-ENTMCNC: 30.6 PG — SIGNIFICANT CHANGE UP (ref 27–34)
MCV RBC AUTO: 101.7 FL — HIGH (ref 80–100)
PLATELET # BLD AUTO: 262 K/UL — SIGNIFICANT CHANGE UP (ref 150–400)
POTASSIUM SERPL-MCNC: 5.3 MMOL/L — SIGNIFICANT CHANGE UP (ref 3.5–5.3)
POTASSIUM SERPL-SCNC: 5.3 MMOL/L — SIGNIFICANT CHANGE UP (ref 3.5–5.3)
RBC # BLD: 3.01 M/UL — LOW (ref 4.2–5.8)
RBC # FLD: 13.9 % — SIGNIFICANT CHANGE UP (ref 10.3–14.5)
SODIUM SERPL-SCNC: 139 MMOL/L — SIGNIFICANT CHANGE UP (ref 135–145)
WBC # BLD: 4.63 K/UL — SIGNIFICANT CHANGE UP (ref 3.8–10.5)
WBC # FLD AUTO: 4.63 K/UL — SIGNIFICANT CHANGE UP (ref 3.8–10.5)

## 2019-08-22 PROCEDURE — 99232 SBSQ HOSP IP/OBS MODERATE 35: CPT

## 2019-08-22 PROCEDURE — 99233 SBSQ HOSP IP/OBS HIGH 50: CPT

## 2019-08-22 RX ADMIN — Medication 100 MILLIGRAM(S): at 15:03

## 2019-08-22 RX ADMIN — MUPIROCIN 1 APPLICATION(S): 20 OINTMENT TOPICAL at 05:29

## 2019-08-22 RX ADMIN — ISOSORBIDE MONONITRATE 30 MILLIGRAM(S): 60 TABLET, EXTENDED RELEASE ORAL at 12:53

## 2019-08-22 RX ADMIN — Medication 25 MILLIGRAM(S): at 05:28

## 2019-08-22 RX ADMIN — CHLORHEXIDINE GLUCONATE 1 APPLICATION(S): 213 SOLUTION TOPICAL at 12:52

## 2019-08-22 RX ADMIN — CARVEDILOL PHOSPHATE 12.5 MILLIGRAM(S): 80 CAPSULE, EXTENDED RELEASE ORAL at 18:09

## 2019-08-22 RX ADMIN — Medication 40 MILLIGRAM(S): at 05:28

## 2019-08-22 RX ADMIN — HEPARIN SODIUM 5000 UNIT(S): 5000 INJECTION INTRAVENOUS; SUBCUTANEOUS at 15:03

## 2019-08-22 RX ADMIN — MUPIROCIN 1 APPLICATION(S): 20 OINTMENT TOPICAL at 18:09

## 2019-08-22 RX ADMIN — Medication 100 MILLIGRAM(S): at 21:56

## 2019-08-22 RX ADMIN — CARVEDILOL PHOSPHATE 12.5 MILLIGRAM(S): 80 CAPSULE, EXTENDED RELEASE ORAL at 05:29

## 2019-08-22 RX ADMIN — Medication 50 MICROGRAM(S): at 05:29

## 2019-08-22 RX ADMIN — Medication 100 MILLIGRAM(S): at 05:29

## 2019-08-22 RX ADMIN — Medication 325 MILLIGRAM(S): at 12:53

## 2019-08-22 RX ADMIN — Medication 25 MILLIGRAM(S): at 18:09

## 2019-08-22 RX ADMIN — HEPARIN SODIUM 5000 UNIT(S): 5000 INJECTION INTRAVENOUS; SUBCUTANEOUS at 21:56

## 2019-08-22 RX ADMIN — HEPARIN SODIUM 5000 UNIT(S): 5000 INJECTION INTRAVENOUS; SUBCUTANEOUS at 05:28

## 2019-08-22 NOTE — PHYSICAL THERAPY INITIAL EVALUATION ADULT - IMPAIRMENTS FOUND, PT EVAL
ventilation and respiration/gas exchange/aerobic capacity/endurance/anthropometric characteristics/muscle strength/gait, locomotion, and balance

## 2019-08-22 NOTE — PROGRESS NOTE ADULT - SUBJECTIVE AND OBJECTIVE BOX
TARA LUCIE  ----------------------------------------  The patient was seen and evaluated for heart failure.  The patient is in no acute distress.  Denied any chest pain, palpitations, or abdominal pain.  Reports dyspnea with exertion.    Vital Signs Last 24 Hrs  T(C): 36.7 (22 Aug 2019 07:49), Max: 36.7 (21 Aug 2019 16:58)  T(F): 98 (22 Aug 2019 07:49), Max: 98 (21 Aug 2019 16:58)  HR: 67 (22 Aug 2019 07:49) (67 - 77)  BP: 113/65 (22 Aug 2019 07:49) (113/65 - 149/69)  BP(mean): --  RR: 18 (22 Aug 2019 07:49) (18 - 18)  SpO2: 94% (21 Aug 2019 21:19) (94% - 96%)    PHYSICAL EXAMINATION:  ----------------------------------------  General appearance: No acute distress, Awake, Alert  HEENT: Normocephalic, Atraumatic, Conjunctiva clear, EOMI  Neck: Supple, No JVD, No tenderness  Lungs: Breath sound equal bilaterally, No wheezes, No rales  Cardiovascular: S1S2, Regular rhythm  Abdomen: Soft, Nontender, Nondistended, No guarding/rebound, Positive bowel sounds  Extremities: No clubbing, No cyanosis, No calf tenderness, Lower extremity edema and redness  Neuro: Strength equal bilaterally, No tremors  Psychiatric: Appropriate mood, Normal affect    LABORATORY STUDIES:  ----------------------------------------             9.2    4.63  )-----------( 262      ( 22 Aug 2019 06:13 )             30.6     08-22    139  |  106  |  53.0<H>  ----------------------------<  98  5.3   |  22.0  |  1.92<H>    Ca    8.3<L>      22 Aug 2019 06:13    MEDICATIONS  (STANDING):  aspirin enteric coated 325 milliGRAM(s) Oral daily  carvedilol 12.5 milliGRAM(s) Oral every 12 hours  chlorhexidine 2% Cloths 1 Application(s) Topical daily  docusate sodium 100 milliGRAM(s) Oral three times a day  furosemide   Injectable 40 milliGRAM(s) IV Push daily  heparin  Injectable 5000 Unit(s) SubCutaneous every 8 hours  hydrALAZINE 25 milliGRAM(s) Oral two times a day  isosorbide   mononitrate ER Tablet (IMDUR) 30 milliGRAM(s) Oral daily  levothyroxine 50 MICROGram(s) Oral daily  mupirocin 2% Ointment 1 Application(s) Both Nostrils two times a day    MEDICATIONS  (PRN):  senna 2 Tablet(s) Oral at bedtime PRN Constipation      ASSESSMENT / PLAN:  ----------------------------------------  89M with a history of hypertension, hypothyroidism, lymphedema, and heart failure referred to the hospital from [facility] with dyspnea due to heart failure treated with intravenous furosemide.    Acute on chronic diastolic heart failure - On intravenous furosemide for diuresis. On carvedilol as well. Cardiology for follow up.    Chronic kidney disease III / Hyperkalemia - Spironolactone held on admission. Following renal function. Hyperkalemia improved.    Hypothyroidism - On levothyroxine.    Hypertension - Close blood pressure monitoring. On carvedilol, hydralazine, and furosemide.    Anemia - Monitoring CBC. Macrocytic anemia. B12 and folate to be followed.    MOLST noted. DNR/DNI TARA LUCIE  ----------------------------------------  The patient was seen and evaluated for heart failure.  The patient is in no acute distress.  Denied any chest pain, palpitations, or abdominal pain.  Reports dyspnea with exertion.    HPI:  89M referred from the nursing home with a two day history of dyspnea requiring supplemental oxygen. Chest Xray noted mild congestive heart failure and intravenous furosemide was initiated for acute on chronic diastolic heart failure. Spironolactone was held due to hyperkalemia. The patient had improvement in the dyspnea with further diuresis.    Vital Signs Last 24 Hrs  T(C): 36.7 (22 Aug 2019 07:49), Max: 36.7 (21 Aug 2019 16:58)  T(F): 98 (22 Aug 2019 07:49), Max: 98 (21 Aug 2019 16:58)  HR: 67 (22 Aug 2019 07:49) (67 - 77)  BP: 113/65 (22 Aug 2019 07:49) (113/65 - 149/69)  BP(mean): --  RR: 18 (22 Aug 2019 07:49) (18 - 18)  SpO2: 94% (21 Aug 2019 21:19) (94% - 96%)    PHYSICAL EXAMINATION:  ----------------------------------------  General appearance: No acute distress, Awake, Alert  HEENT: Normocephalic, Atraumatic, Conjunctiva clear, EOMI  Neck: Supple, No JVD, No tenderness  Lungs: Breath sound equal bilaterally, No wheezes, No rales  Cardiovascular: S1S2, Regular rhythm  Abdomen: Soft, Nontender, Nondistended, No guarding/rebound, Positive bowel sounds  Extremities: No clubbing, No cyanosis, No calf tenderness, Lower extremity edema and redness  Neuro: Strength equal bilaterally, No tremors  Psychiatric: Appropriate mood, Normal affect    LABORATORY STUDIES:  ----------------------------------------             9.2    4.63  )-----------( 262      ( 22 Aug 2019 06:13 )             30.6     08-22    139  |  106  |  53.0<H>  ----------------------------<  98  5.3   |  22.0  |  1.92<H>    Ca    8.3<L>      22 Aug 2019 06:13    MEDICATIONS  (STANDING):  aspirin enteric coated 325 milliGRAM(s) Oral daily  carvedilol 12.5 milliGRAM(s) Oral every 12 hours  chlorhexidine 2% Cloths 1 Application(s) Topical daily  docusate sodium 100 milliGRAM(s) Oral three times a day  furosemide   Injectable 40 milliGRAM(s) IV Push daily  heparin  Injectable 5000 Unit(s) SubCutaneous every 8 hours  hydrALAZINE 25 milliGRAM(s) Oral two times a day  isosorbide   mononitrate ER Tablet (IMDUR) 30 milliGRAM(s) Oral daily  levothyroxine 50 MICROGram(s) Oral daily  mupirocin 2% Ointment 1 Application(s) Both Nostrils two times a day    MEDICATIONS  (PRN):  senna 2 Tablet(s) Oral at bedtime PRN Constipation      ASSESSMENT / PLAN:  ----------------------------------------  89M with a history of hypertension, hypothyroidism, lymphedema, and heart failure referred to the hospital from [facility] with dyspnea due to heart failure treated with intravenous furosemide.    Acute on chronic diastolic heart failure - On intravenous furosemide for diuresis. On carvedilol as well. Cardiology for follow up.    Chronic kidney disease III / Hyperkalemia - Spironolactone held on admission. Following renal function. Hyperkalemia improved.    Hypothyroidism - On levothyroxine.    Hypertension - Close blood pressure monitoring. On carvedilol, hydralazine, and furosemide.    Anemia - Monitoring CBC. Macrocytic anemia. B12 and folate to be followed.    MOLST noted. DNR/DNI

## 2019-08-22 NOTE — PROGRESS NOTE ADULT - SUBJECTIVE AND OBJECTIVE BOX
[place] [facility] Faculty Practice                                                        Office: [address] [address]                                                       Telephone: [phone]. Fax:[phone]                                                                             PROGRESS NOTE   Reason for follow up: congestive heart failure and severe aortic stenosis                             Overnight: No new events.   Update: responded to diuretics. feels a lot better. dyspnea improved.     Subjective: " i am feeling a lot better "   Complains of:  no new symptoms. Out of Bed to Chair   Review of symptoms: Cardiac:  No chest pain. + dyspnea imporved.  No palpitations.  Respiratory:no cough. +  dyspnea  Gastrointestinal: No diarrhea. No abdominal pain. No bleeding.     Past medical history: No updates.   Chronic conditions:  Hypertension: controlled. CHF: + dyspnea.   	  Vitals:  T(C): 36.7 (08-22-19 @ 17:02), Max: 36.7 (08-22-19 @ 07:49)  HR: 63 (08-22-19 @ 17:02) (63 - 70)  BP: 127/47 (08-22-19 @ 17:02) (113/65 - 149/69)  RR: 18 (08-22-19 @ 17:02) (18 - 18)  SpO2: 97% (08-22-19 @ 17:02) (97% - 97%)  Wt(kg): --  I&O's Summary    21 Aug 2019 07:01  -  22 Aug 2019 07:00  --------------------------------------------------------  IN: 720 mL / OUT: 1150 mL / NET: -430 mL    22 Aug 2019 07:01  -  22 Aug 2019 23:20  --------------------------------------------------------  IN: 0 mL / OUT: 200 mL / NET: -200 mL      Weight (kg): 114.8 (08-19 @ 09:29)    PHYSICAL EXAM:  Appearance: Comfortable. No acute distress  HEENT:  Head and neck: Atraumatic. Normocephalic.  Normal oral mucosa, PERRL, Neck is supple.   Neurologic: A & O x 3, no focal deficits. EOMI , Cranial nerves are intact.  Lymphatic: No cervical lymphadenopathy  Cardiovascular: Normal S1 S2, 5/6 systolicejection  murmur,  no rubs/gallops.   Respiratory: Lungs clear to auscultation  Gastrointestinal:  Soft, Non-tender, + BS  Lower Extremities: 3+ bilatearl pitting and non pitting edema  Psychiatry: Patient is calm. No agitation. Mood & affect appropriate  Skin:  chornic skin changes in the legs and feet     CURRENT MEDICATIONS:  carvedilol 12.5 milliGRAM(s) Oral every 12 hours  furosemide   Injectable 40 milliGRAM(s) IV Push daily  hydrALAZINE 25 milliGRAM(s) Oral two times a day  isosorbide   mononitrate ER Tablet (IMDUR) 30 milliGRAM(s) Oral daily    docusate sodium  levothyroxine  aspirin enteric coated  chlorhexidine 2% Cloths  heparin  Injectable  mupirocin 2% Ointment      LABS:	 	                              9.2    4.63  )-----------( 262      ( 22 Aug 2019 06:13 )             30.6     08-22    139  |  106  |  53.0<H>  ----------------------------<  98  5.3   |  22.0  |  1.92<H>    Ca    8.3<L>      22 Aug 2019 06:13      proBNP: Serum Pro-Brain Natriuretic Peptide: 3394 pg/mL (08-19 @ 10:10)    Lipid Profile:   HgA1c: TSH:     TELEMETRY: Reviewed    ECG:  Reviewed by me. 	sinus rhythm.     DIAGNOSTIC TESTING:  [ ] Echocardiogram: moderate aortic stenosis. normal EF

## 2019-08-22 NOTE — PHYSICAL THERAPY INITIAL EVALUATION ADULT - CRITERIA FOR SKILLED THERAPEUTIC INTERVENTIONS
impairments found/anticipated equipment needs at discharge/anticipated discharge recommendation/HUMBERTO

## 2019-08-22 NOTE — PHYSICAL THERAPY INITIAL EVALUATION ADULT - PERTINENT HX OF CURRENT PROBLEM, REHAB EVAL
Per Hospitalist: 89M with a history of hypertension, hypothyroidism, lymphedema, and heart failure referred to the hospital from [facility] with dyspnea due to heart failure

## 2019-08-22 NOTE — PROGRESS NOTE ADULT - ASSESSMENT
89 yo M presenting from [facility] for dyspnea. PMH HTN, hypothyroidism, HFpEF, chronic lymphedema. Patient states for the past 2 days he has been having dyspnea with very little exertion and even at rest. He has been requiring O2 while at his nursing facility however normally he does not require O2. He denies any orthopnea or PND, denies chest pain, palpitations, light-heaeddness, dizziness, abd pain, nausea, vomiting, melena, hematochezia, dysuria.   He has no other complaints, his SOB has improved since he has been in the hospital. Patients diuretics were held during his last hospitalization in July 2019 for PNA and CHF exac and he was currently on Aldactone 50mg/day but no lasix.   In ED he was noted to have K 6.3, given lasix, insulin, dextrose, and bicarbonate. Of note he has had chronic lymphedema in b/l LE for the past 10-15 years. (19 Aug 2019 13:17)    Acute on Chronic decompensated HFpEF  - Monitor on telemetry.   - Strict i/o and daily standing weights (if possible).    - Keep K > 4, Mg > 2.   - Monitor renal function with ongoing diuresis. History CKD  - continue lasix. change to PO torzemide on discharge. metolazone 2.5 mg Every other 2 days.   - continue hydralazine/coreg  -  may have underlying ischemia. conservative management    Aortic stenosis murmur, does not correlate with the echo findings. possibly moderate to severe aortic stenosis is worse than documented on the echo.    nevertheless, the patient is not candidate for any intervention. medical management. discussed with patient in detail.     2) leg edema and lympedema. ct diuretics.  ACE Wrap. elevated legs.   .

## 2019-08-22 NOTE — PHYSICAL THERAPY INITIAL EVALUATION ADULT - ADDITIONAL COMMENTS
Pt lives in an apartment with 2 steps to enter with handrails, no steps inside. Pt states he was independent with a rollator at home. Pt was admitted from [facility] at [facility].

## 2019-08-23 DIAGNOSIS — I50.9 HEART FAILURE, UNSPECIFIED: ICD-10-CM

## 2019-08-23 LAB
ANION GAP SERPL CALC-SCNC: 12 MMOL/L — SIGNIFICANT CHANGE UP (ref 5–17)
BUN SERPL-MCNC: 55 MG/DL — HIGH (ref 8–20)
CALCIUM SERPL-MCNC: 8.5 MG/DL — LOW (ref 8.6–10.2)
CHLORIDE SERPL-SCNC: 106 MMOL/L — SIGNIFICANT CHANGE UP (ref 98–107)
CO2 SERPL-SCNC: 23 MMOL/L — SIGNIFICANT CHANGE UP (ref 22–29)
CREAT SERPL-MCNC: 1.93 MG/DL — HIGH (ref 0.5–1.3)
FOLATE SERPL-MCNC: 7.1 NG/ML — SIGNIFICANT CHANGE UP
GLUCOSE SERPL-MCNC: 102 MG/DL — SIGNIFICANT CHANGE UP (ref 70–115)
HCT VFR BLD CALC: 30.8 % — LOW (ref 39–50)
HGB BLD-MCNC: 9.3 G/DL — LOW (ref 13–17)
MCHC RBC-ENTMCNC: 30.2 GM/DL — LOW (ref 32–36)
MCHC RBC-ENTMCNC: 30.4 PG — SIGNIFICANT CHANGE UP (ref 27–34)
MCV RBC AUTO: 100.7 FL — HIGH (ref 80–100)
PLATELET # BLD AUTO: 272 K/UL — SIGNIFICANT CHANGE UP (ref 150–400)
POTASSIUM SERPL-MCNC: 5.4 MMOL/L — HIGH (ref 3.5–5.3)
POTASSIUM SERPL-SCNC: 5.4 MMOL/L — HIGH (ref 3.5–5.3)
RBC # BLD: 3.06 M/UL — LOW (ref 4.2–5.8)
RBC # FLD: 13.9 % — SIGNIFICANT CHANGE UP (ref 10.3–14.5)
SODIUM SERPL-SCNC: 141 MMOL/L — SIGNIFICANT CHANGE UP (ref 135–145)
VIT B12 SERPL-MCNC: 295 PG/ML — SIGNIFICANT CHANGE UP (ref 232–1245)
WBC # BLD: 4.21 K/UL — SIGNIFICANT CHANGE UP (ref 3.8–10.5)
WBC # FLD AUTO: 4.21 K/UL — SIGNIFICANT CHANGE UP (ref 3.8–10.5)

## 2019-08-23 PROCEDURE — 99233 SBSQ HOSP IP/OBS HIGH 50: CPT

## 2019-08-23 RX ORDER — FUROSEMIDE 40 MG
40 TABLET ORAL
Refills: 0 | Status: DISCONTINUED | OUTPATIENT
Start: 2019-08-23 | End: 2019-08-24

## 2019-08-23 RX ADMIN — Medication 100 MILLIGRAM(S): at 21:17

## 2019-08-23 RX ADMIN — CARVEDILOL PHOSPHATE 12.5 MILLIGRAM(S): 80 CAPSULE, EXTENDED RELEASE ORAL at 17:29

## 2019-08-23 RX ADMIN — Medication 325 MILLIGRAM(S): at 11:06

## 2019-08-23 RX ADMIN — MUPIROCIN 1 APPLICATION(S): 20 OINTMENT TOPICAL at 17:29

## 2019-08-23 RX ADMIN — HEPARIN SODIUM 5000 UNIT(S): 5000 INJECTION INTRAVENOUS; SUBCUTANEOUS at 11:06

## 2019-08-23 RX ADMIN — Medication 40 MILLIGRAM(S): at 05:24

## 2019-08-23 RX ADMIN — ISOSORBIDE MONONITRATE 30 MILLIGRAM(S): 60 TABLET, EXTENDED RELEASE ORAL at 11:06

## 2019-08-23 RX ADMIN — MUPIROCIN 1 APPLICATION(S): 20 OINTMENT TOPICAL at 05:24

## 2019-08-23 RX ADMIN — HEPARIN SODIUM 5000 UNIT(S): 5000 INJECTION INTRAVENOUS; SUBCUTANEOUS at 21:17

## 2019-08-23 RX ADMIN — Medication 50 MICROGRAM(S): at 05:25

## 2019-08-23 RX ADMIN — CARVEDILOL PHOSPHATE 12.5 MILLIGRAM(S): 80 CAPSULE, EXTENDED RELEASE ORAL at 05:25

## 2019-08-23 RX ADMIN — Medication 100 MILLIGRAM(S): at 11:06

## 2019-08-23 RX ADMIN — Medication 25 MILLIGRAM(S): at 17:29

## 2019-08-23 RX ADMIN — Medication 40 MILLIGRAM(S): at 19:34

## 2019-08-23 RX ADMIN — HEPARIN SODIUM 5000 UNIT(S): 5000 INJECTION INTRAVENOUS; SUBCUTANEOUS at 05:24

## 2019-08-23 RX ADMIN — CHLORHEXIDINE GLUCONATE 1 APPLICATION(S): 213 SOLUTION TOPICAL at 11:06

## 2019-08-23 RX ADMIN — Medication 25 MILLIGRAM(S): at 05:25

## 2019-08-23 RX ADMIN — Medication 100 MILLIGRAM(S): at 05:25

## 2019-08-23 NOTE — PROGRESS NOTE ADULT - SUBJECTIVE AND OBJECTIVE BOX
[facility]  [facility]ACULITY PRACT[facility]  [address], [place] [address]    REASON FOR VISIT:  Follow up on chf -> Severe aortic stenosis  UPDATE: States he feels much better.  Legs are still edematous but also has chronic edema  TELEMETRY MONITORING:  Off [facility]    08-23    141  |  106  |  55.0<H>  ----------------------------<  102  5.4<H>   |  23.0  |  1.93<H>    Ca    8.5<L>      23 Aug 2019 08:00    08-22-19 @ 07:01  -  08-23-19 @ 07:00  --------------------------------------------------------  IN: 0 mL / OUT: 700 mL / NET: -700 mL    MEDICATIONS  (STANDING):  aspirin enteric coated 325 milliGRAM(s) Oral daily  carvedilol 12.5 milliGRAM(s) Oral every 12 hours  chlorhexidine 2% Cloths 1 Application(s) Topical daily  docusate sodium 100 milliGRAM(s) Oral three times a day  furosemide   Injectable 40 milliGRAM(s) IV Push daily  heparin  Injectable 5000 Unit(s) SubCutaneous every 8 hours  hydrALAZINE 25 milliGRAM(s) Oral two times a day  isosorbide   mononitrate ER Tablet (IMDUR) 30 milliGRAM(s) Oral daily  levothyroxine 50 MICROGram(s) Oral daily  mupirocin 2% Ointment 1 Application(s) Both Nostrils two times a day      Vital Signs Last 24 Hrs  T(C): 36.4 (23 Aug 2019 08:11), Max: 36.7 (22 Aug 2019 17:02)  T(F): 97.6 (23 Aug 2019 08:11), Max: 98 (22 Aug 2019 17:02)  HR: 85 (23 Aug 2019 08:11) (63 - 85)  BP: 128/62 (23 Aug 2019 08:11) (127/47 - 139/61)  BP(mean): --  RR: 18 (23 Aug 2019 08:11) (18 - 20)  SpO2: 97% (23 Aug 2019 05:18) (97% - 98%)  T(C): 36.4 (08-23-19 @ 08:11), Max: 36.7 (08-22-19 @ 17:02)  HR: 85 (08-23-19 @ 08:11) (63 - 85)  BP: 128/62 (08-23-19 @ 08:11) (127/47 - 139/61)  RR: 18 (08-23-19 @ 08:11) (18 - 20)  SpO2: 97% (08-23-19 @ 05:18) (97% - 98%)    Appears younger than his stated age  HEENT Head atraumatic eomi, oral mucosa moist  CV S1&S2 Normal   RESP  decreased breath sounds  GI  Soft active bowel sounds non tender  EXT  Legs wrapped  ++ edema bilateral lower ext with hemosiderin stains and chronic dermatitis  NEURO  Alert oriented  No gross motor or sensory deficits    < from: TTE Echo Complete w/Doppler (07.24.19 @ 09:21) >  Summary:   1. Left ventricular ejection fraction, by visual estimation, is 60 to   65%.   2. Normal global left ventricular systolic function.   3. LV Ejection Fraction by Roman's Method with a biplane EF of 68 %.   4. Mildly increased LV wall thickness.   5. Normal left ventricular internal cavity size.   6. Trivial pericardial effusion.   7. Peak transmitral mean gradient equals 5.6 mmHg, calculated mitral   valve area by pressure half time equals 2.20 cm² consistent with mild   mitral stenosis.   8. Severe mitral annular calcification.   9. Thickening of the anterior and posterior mitral valve leaflets.  10. Endocardial visualization was enhanced with intravenous echo contrast.  11. Mitral valve mean gradient is 5.6 mmHg consistent with mild mitral   stenosis.  12. Peak transaortic gradient equals 22.0 mmHg, mean transaortic gradient   equals 12.5 mmHg, the calculated aortic valve area equals 1.07 cm² by the   continuity equation consistent with mild aortic stenosis.  13. The aortic valve mean gradient is 12.5 mmHg consistent with mild   aortic stenosis.  14. There is severe aortic root calcification. [facility]  [facility] PRACT[facility]  [address], [place] [address]    REASON FOR VISIT:  Follow up on chf -> Severe aortic stenosis  UPDATE: States he feels much better.  Legs are still edematous but also has chronic edema  TELEMETRY MONITORING:  Off montior    ROS: cardiacL: no chestp ain  Res: Dyspnea improved  GI: no diarrhea.      08-23    141  |  106  |  55.0<H>  ----------------------------<  102  5.4<H>   |  23.0  |  1.93<H>    Ca    8.5<L>      23 Aug 2019 08:00    08-22-19 @ 07:01  -  08-23-19 @ 07:00  --------------------------------------------------------  IN: 0 mL / OUT: 700 mL / NET: -700 mL    MEDICATIONS  (STANDING):  aspirin enteric coated 325 milliGRAM(s) Oral daily  carvedilol 12.5 milliGRAM(s) Oral every 12 hours  chlorhexidine 2% Cloths 1 Application(s) Topical daily  docusate sodium 100 milliGRAM(s) Oral three times a day  furosemide   Injectable 40 milliGRAM(s) IV Push daily  heparin  Injectable 5000 Unit(s) SubCutaneous every 8 hours  hydrALAZINE 25 milliGRAM(s) Oral two times a day  isosorbide   mononitrate ER Tablet (IMDUR) 30 milliGRAM(s) Oral daily  levothyroxine 50 MICROGram(s) Oral daily  mupirocin 2% Ointment 1 Application(s) Both Nostrils two times a day      Vital Signs Last 24 Hrs  T(C): 36.4 (23 Aug 2019 08:11), Max: 36.7 (22 Aug 2019 17:02)  T(F): 97.6 (23 Aug 2019 08:11), Max: 98 (22 Aug 2019 17:02)  HR: 85 (23 Aug 2019 08:11) (63 - 85)  BP: 128/62 (23 Aug 2019 08:11) (127/47 - 139/61)  BP(mean): --  RR: 18 (23 Aug 2019 08:11) (18 - 20)  SpO2: 97% (23 Aug 2019 05:18) (97% - 98%)  T(C): 36.4 (08-23-19 @ 08:11), Max: 36.7 (08-22-19 @ 17:02)  HR: 85 (08-23-19 @ 08:11) (63 - 85)  BP: 128/62 (08-23-19 @ 08:11) (127/47 - 139/61)  RR: 18 (08-23-19 @ 08:11) (18 - 20)  SpO2: 97% (08-23-19 @ 05:18) (97% - 98%)    Appears younger than his stated age  HEENT Head atraumatic eomi, oral mucosa moist  CV S1&S2 Normal   RESP  decreased breath sounds  GI  Soft active bowel sounds non tender  EXT  Legs wrapped  ++ edema bilateral lower ext with hemosiderin stains and chronic dermatitis  NEURO  Alert oriented  No gross motor or sensory deficits    < from: TTE Echo Complete w/Doppler (07.24.19 @ 09:21) >  Summary:   1. Left ventricular ejection fraction, by visual estimation, is 60 to   65%.   2. Normal global left ventricular systolic function.   3. LV Ejection Fraction by Roman's Method with a biplane EF of 68 %.   4. Mildly increased LV wall thickness.   5. Normal left ventricular internal cavity size.   6. Trivial pericardial effusion.   7. Peak transmitral mean gradient equals 5.6 mmHg, calculated mitral   valve area by pressure half time equals 2.20 cm² consistent with mild   mitral stenosis.   8. Severe mitral annular calcification.   9. Thickening of the anterior and posterior mitral valve leaflets.  10. Endocardial visualization was enhanced with intravenous echo contrast.  11. Mitral valve mean gradient is 5.6 mmHg consistent with mild mitral   stenosis.  12. Peak transaortic gradient equals 22.0 mmHg, mean transaortic gradient   equals 12.5 mmHg, the calculated aortic valve area equals 1.07 cm² by the   continuity equation consistent with mild aortic stenosis.  13. The aortic valve mean gradient is 12.5 mmHg consistent with mild   aortic stenosis.  14. There is severe aortic root calcification.

## 2019-08-23 NOTE — PROGRESS NOTE ADULT - PROBLEM SELECTOR PLAN 1
Severe aortic stenosis  patient to back to nsg home today  c/w Lasix, hydralazine and imdur  No aldactone due to mild hyperkalemia  to be monitored in ns home  No surgical intervention in this elderly male  No further Cardiology workup planned

## 2019-08-23 NOTE — PROGRESS NOTE ADULT - ASSESSMENT
89M with a history of hypertension, hypothyroidism, lymphedema, and heart failure referred to the hospital from [facility] with dyspnea due to heart failure treated with intravenous furosemide.    >Acute on chronic diastolic heart failure -   -c/w  furosemide / dose increased to 40 bid as per cardio recs   -c/w carvedilol as well.   -Cardiology for follow up.    >Chronic kidney disease III / Hyperkalemia -   Spironolactone held on admission.   Following renal function.     >Hyperkalemia improved.    >Hypothyroidism - On levothyroxine.    >Hypertension - Close blood pressure monitoring. On carvedilol, hydralazine, and furosemide.    >Anemia - Monitoring CBC. Macrocytic anemia. B12 and folate to be followed.    MOLST noted. DNR/DNI

## 2019-08-23 NOTE — PROGRESS NOTE ADULT - SUBJECTIVE AND OBJECTIVE BOX
9.3    4.21  )-----------( 272      ( 23 Aug 2019 08:00 )             30.8   cc: chf     interval hx : The patient was seen and evaluated. sob better Denied any chest pain, palpitations, or abdominal pain.      Vital Signs Last 24 Hrs  T(C): 37 (23 Aug 2019 16:56), Max: 37 (23 Aug 2019 16:56)  T(F): 98.6 (23 Aug 2019 16:56), Max: 98.6 (23 Aug 2019 16:56)  HR: 71 (23 Aug 2019 16:56) (68 - 85)  BP: 118/51 (23 Aug 2019 16:56) (118/51 - 139/61)  BP(mean): --  RR: 18 (23 Aug 2019 16:56) (18 - 20)  SpO2: 95% (23 Aug 2019 16:56) (95% - 98%)    PHYSICAL EXAMINATION:  General appearance: No acute distress, Awake, Alert  HEENT: Normocephalic, Atraumatic, Conjunctiva clear, EOMI  Neck: Supple, No JVD, No tenderness  Lungs: Breath sound equal bilaterally, No wheezes, No rales  Cardiovascular: S1S2, Regular rhythm  Abdomen: Soft, Nontender, Nondistended, No guarding/rebound, Positive bowel sounds  Extremities: No clubbing, No cyanosis, No calf tenderness, Lower extremity edema and redness  Neuro: Strength equal bilaterally, No tremors  Psychiatric: Appropriate mood, Normal affect    08-23    141  |  106  |  55.0<H>  ----------------------------<  102  5.4<H>   |  23.0  |  1.93<H>    Ca    8.5<L>      23 Aug 2019 08:00

## 2019-08-24 LAB
ANION GAP SERPL CALC-SCNC: 11 MMOL/L — SIGNIFICANT CHANGE UP (ref 5–17)
BUN SERPL-MCNC: 54 MG/DL — HIGH (ref 8–20)
CALCIUM SERPL-MCNC: 8.4 MG/DL — LOW (ref 8.6–10.2)
CHLORIDE SERPL-SCNC: 106 MMOL/L — SIGNIFICANT CHANGE UP (ref 98–107)
CO2 SERPL-SCNC: 25 MMOL/L — SIGNIFICANT CHANGE UP (ref 22–29)
CREAT SERPL-MCNC: 2.04 MG/DL — HIGH (ref 0.5–1.3)
GLUCOSE SERPL-MCNC: 91 MG/DL — SIGNIFICANT CHANGE UP (ref 70–115)
HCT VFR BLD CALC: 30.9 % — LOW (ref 39–50)
HGB BLD-MCNC: 9.5 G/DL — LOW (ref 13–17)
MCHC RBC-ENTMCNC: 30.7 GM/DL — LOW (ref 32–36)
MCHC RBC-ENTMCNC: 31.1 PG — SIGNIFICANT CHANGE UP (ref 27–34)
MCV RBC AUTO: 101.3 FL — HIGH (ref 80–100)
PLATELET # BLD AUTO: 289 K/UL — SIGNIFICANT CHANGE UP (ref 150–400)
POTASSIUM SERPL-MCNC: 5.2 MMOL/L — SIGNIFICANT CHANGE UP (ref 3.5–5.3)
POTASSIUM SERPL-SCNC: 5.2 MMOL/L — SIGNIFICANT CHANGE UP (ref 3.5–5.3)
RBC # BLD: 3.05 M/UL — LOW (ref 4.2–5.8)
RBC # FLD: 14 % — SIGNIFICANT CHANGE UP (ref 10.3–14.5)
SODIUM SERPL-SCNC: 142 MMOL/L — SIGNIFICANT CHANGE UP (ref 135–145)
WBC # BLD: 4.73 K/UL — SIGNIFICANT CHANGE UP (ref 3.8–10.5)
WBC # FLD AUTO: 4.73 K/UL — SIGNIFICANT CHANGE UP (ref 3.8–10.5)

## 2019-08-24 PROCEDURE — 99233 SBSQ HOSP IP/OBS HIGH 50: CPT

## 2019-08-24 RX ORDER — FUROSEMIDE 40 MG
40 TABLET ORAL
Refills: 0 | Status: DISCONTINUED | OUTPATIENT
Start: 2019-08-24 | End: 2019-08-25

## 2019-08-24 RX ORDER — CARVEDILOL PHOSPHATE 80 MG/1
6.25 CAPSULE, EXTENDED RELEASE ORAL EVERY 12 HOURS
Refills: 0 | Status: DISCONTINUED | OUTPATIENT
Start: 2019-08-24 | End: 2019-08-25

## 2019-08-24 RX ADMIN — Medication 50 MICROGRAM(S): at 05:39

## 2019-08-24 RX ADMIN — Medication 100 MILLIGRAM(S): at 22:04

## 2019-08-24 RX ADMIN — MUPIROCIN 1 APPLICATION(S): 20 OINTMENT TOPICAL at 17:12

## 2019-08-24 RX ADMIN — Medication 325 MILLIGRAM(S): at 10:37

## 2019-08-24 RX ADMIN — Medication 100 MILLIGRAM(S): at 10:38

## 2019-08-24 RX ADMIN — CARVEDILOL PHOSPHATE 12.5 MILLIGRAM(S): 80 CAPSULE, EXTENDED RELEASE ORAL at 05:39

## 2019-08-24 RX ADMIN — Medication 40 MILLIGRAM(S): at 05:39

## 2019-08-24 RX ADMIN — HEPARIN SODIUM 5000 UNIT(S): 5000 INJECTION INTRAVENOUS; SUBCUTANEOUS at 10:38

## 2019-08-24 RX ADMIN — CHLORHEXIDINE GLUCONATE 1 APPLICATION(S): 213 SOLUTION TOPICAL at 10:39

## 2019-08-24 RX ADMIN — HEPARIN SODIUM 5000 UNIT(S): 5000 INJECTION INTRAVENOUS; SUBCUTANEOUS at 05:39

## 2019-08-24 RX ADMIN — Medication 100 MILLIGRAM(S): at 05:39

## 2019-08-24 RX ADMIN — ISOSORBIDE MONONITRATE 30 MILLIGRAM(S): 60 TABLET, EXTENDED RELEASE ORAL at 10:38

## 2019-08-24 RX ADMIN — HEPARIN SODIUM 5000 UNIT(S): 5000 INJECTION INTRAVENOUS; SUBCUTANEOUS at 22:02

## 2019-08-24 RX ADMIN — Medication 25 MILLIGRAM(S): at 05:39

## 2019-08-24 RX ADMIN — Medication 40 MILLIGRAM(S): at 17:12

## 2019-08-24 RX ADMIN — CARVEDILOL PHOSPHATE 6.25 MILLIGRAM(S): 80 CAPSULE, EXTENDED RELEASE ORAL at 17:12

## 2019-08-24 RX ADMIN — MUPIROCIN 1 APPLICATION(S): 20 OINTMENT TOPICAL at 05:39

## 2019-08-24 NOTE — DIETITIAN INITIAL EVALUATION ADULT. - PERTINENT LABORATORY DATA
08-24 Na142 mmol/L Glu 91 mg/dL K+ 5.2 mmol/L Cr  2.04 mg/dL<H> BUN 54.0 mg/dL<H> Phos n/a   Alb n/a   PAB n/a

## 2019-08-24 NOTE — PROGRESS NOTE ADULT - SUBJECTIVE AND OBJECTIVE BOX
TARA FAULKNER    [id number]    89y      Male    INTERVAL HPI/OVERNIGHT EVENTS:    patient being seen for CHF. Patient seen at bedside and deneis any complaints    REVIEW OF SYSTEMS:    CONSTITUTIONAL: No fever, weight loss, or fatigue  RESPIRATORY: No cough, wheezing, hemoptysis; No shortness of breath  CARDIOVASCULAR: No chest pain, palpitations  GASTROINTESTINAL: No abdominal or epigastric pain. No nausea, vomiting  NEUROLOGICAL: No headaches, memory loss, loss of strength.  MISCELLANEOUS:      Vital Signs Last 24 Hrs  T(C): 36.5 (24 Aug 2019 07:49), Max: 37 (23 Aug 2019 16:56)  T(F): 97.7 (24 Aug 2019 07:49), Max: 98.6 (23 Aug 2019 16:56)  HR: 62 (24 Aug 2019 07:49) (62 - 71)  BP: 126/54 (24 Aug 2019 07:49) (111/57 - 148/68)  BP(mean): --  RR: 20 (24 Aug 2019 07:49) (18 - 20)  SpO2: 98% (24 Aug 2019 04:47) (95% - 98%)    PHYSICAL EXAM:    General appearance: No acute distress, Awake, Alert  HEENT: Normocephalic, Atraumatic, Conjunctiva clear, EOMI  Neck: Supple, No JVD, No tenderness  Lungs: Breath sound equal bilaterally, No wheezes, No rales  Cardiovascular: S1S2, Regular rhythm  Abdomen: Soft, Nontender, Nondistended, No guarding/rebound, Positive bowel sounds  Extremities:Lower extremity edema and redness  Neuro: Strength equal bilaterally, No tremors  Psychiatric: Appropriate mood, Normal affect      LABS:                        9.5    4.73  )-----------( 289      ( 24 Aug 2019 06:49 )             30.9     08-24    142  |  106  |  54.0<H>  ----------------------------<  91  5.2   |  25.0  |  2.04<H>    Ca    8.4<L>      24 Aug 2019 06:49        MEDICATIONS  (STANDING):  aspirin enteric coated 325 milliGRAM(s) Oral daily  carvedilol 12.5 milliGRAM(s) Oral every 12 hours  chlorhexidine 2% Cloths 1 Application(s) Topical daily  docusate sodium 100 milliGRAM(s) Oral three times a day  furosemide    Tablet 40 milliGRAM(s) Oral two times a day  heparin  Injectable 5000 Unit(s) SubCutaneous every 8 hours  hydrALAZINE 25 milliGRAM(s) Oral two times a day  isosorbide   mononitrate ER Tablet (IMDUR) 30 milliGRAM(s) Oral daily  levothyroxine 50 MICROGram(s) Oral daily  mupirocin 2% Ointment 1 Application(s) Both Nostrils two times a day    MEDICATIONS  (PRN):  senna 2 Tablet(s) Oral at bedtime PRN Constipation      RADIOLOGY & ADDITIONAL TESTS:

## 2019-08-24 NOTE — PROGRESS NOTE ADULT - ASSESSMENT
89 yo M presenting from [facility] for dyspnea. PMH HTN, hypothyroidism, HFpEF, chronic lymphedema presented with sob and increased edema.  Loop  diuretics were held due to worsening renal function.  Patient was maintained on aldactone only  Found to be hyperkalemic

## 2019-08-24 NOTE — PROGRESS NOTE ADULT - ASSESSMENT
89M with a history of hypertension, hypothyroidism, lymphedema, and heart failure referred to the hospital from [facility] with dyspnea due to heart failure now improved    >Acute on chronic diastolic heart failure -   -improved  --> po lasix bid   -c/ardio following    >Chronic kidney disease III / Hyperkalemia -   Spironolactone held on admission.  - cr slighlty increased    >Hyperkalemia improved.    >Hypothyroidism - On levothyroxine.    >Hypertension - stable     >Anemia - Monitoring CBC. Macrocytic anemia.     MOLST noted. DNR/DNI

## 2019-08-24 NOTE — DIETITIAN INITIAL EVALUATION ADULT. - OTHER INFO
Pt is a 89 yo M presenting from [facility] for dyspnea. PMH HTN, hypothyroidism, chronic lymphedema. He has been requiring O2 while at his nursing facility however normally he does not require O2. He denies any abd pain, nausea, vomiting. Pt with good po intake. Reviewed heart failure diet with pt. Pt reports no weight changes.  Legs are still edematous but also has chronic edema. Plan is placement.

## 2019-08-24 NOTE — PROGRESS NOTE ADULT - SUBJECTIVE AND OBJECTIVE BOX
[facility]  FACULITY PRACT[facility]  [address], [place] [address]    REASON FOR VISIT:  Follow up on chf -> Severe aortic stenosis  UPDATE:  still edema.  Out of Bed to Chair   TELEMETRY MONITORING:  Off montior    ROS: cardiacL: no chestp ain  Res: Dyspnea improved  GI: no diarrhea.      0                      9.5    4.73  )-----------( 289      ( 24 Aug 2019 06:49 )             30.9   N=x    ; L=x        24 Aug 2019 06:49    142    |  106    |  54.0   ----------------------------<  91     5.2     |  25.0   |  2.04     Ca    8.4        24 Aug 2019 06:49        MEDICATIONS  (STANDING):  carvedilol 6.25 milliGRAM(s) Oral every 12 hours  furosemide    Tablet 40 milliGRAM(s) Oral two times a day  isosorbide   mononitrate ER Tablet (IMDUR) 30 milliGRAM(s) Oral daily    docusate sodium  aspirin enteric coated  chlorhexidine 2% Cloths  heparin  Injectable  levothyroxine  mupirocin 2% Ointment    PRN: senna PRN      Vital Signs Last 24 Hrs  Vital Signs Last 24 Hrs  T(C): 36.6 (08-24-19 @ 16:35), Max: 36.6 (08-24-19 @ 16:35)  T(F): 97.9 (08-24-19 @ 16:35), Max: 97.9 (08-24-19 @ 16:35)  HR: 66 (08-24-19 @ 16:35) (62 - 70)  BP: 122/47 (08-24-19 @ 16:35) (122/47 - 148/68)  BP(mean): --  RR: 20 (08-24-19 @ 16:35) (20 - 20)  SpO2: 97% (08-24-19 @ 16:35) (97% - 98%)    Appears younger than his stated age  HEENT Head atraumatic eomi, oral mucosa moist  CV S1&S2 Normal   RESP  decreased breath sounds  GI  Soft active bowel sounds non tender  EXT  Legs wrapped  ++ edema bilateral lower ext with hemosiderin stains and chronic dermatitis  NEURO  Alert oriented  No gross motor or sensory deficits  LNs: cervical. NOt palpable.   < from: TTE Echo Complete w/Doppler (07.24.19 @ 09:21) >  Summary:   1. Left ventricular ejection fraction, by visual estimation, is 60 to   65%.   2. Normal global left ventricular systolic function.   3. LV Ejection Fraction by Roman's Method with a biplane EF of 68 %.   4. Mildly increased LV wall thickness.   5. Normal left ventricular internal cavity size.   6. Trivial pericardial effusion.   7. Peak transmitral mean gradient equals 5.6 mmHg, calculated mitral   valve area by pressure half time equals 2.20 cm² consistent with mild   mitral stenosis.   8. Severe mitral annular calcification.   9. Thickening of the anterior and posterior mitral valve leaflets.  10. Endocardial visualization was enhanced with intravenous echo contrast.  11. Mitral valve mean gradient is 5.6 mmHg consistent with mild mitral   stenosis.  12. Peak transaortic gradient equals 22.0 mmHg, mean transaortic gradient   equals 12.5 mmHg, the calculated aortic valve area equals 1.07 cm² by the   continuity equation consistent with mild aortic stenosis.  13. The aortic valve mean gradient is 12.5 mmHg consistent with mild   aortic stenosis.  14. There is severe aortic root calcification.

## 2019-08-25 VITALS
RESPIRATION RATE: 20 BRPM | DIASTOLIC BLOOD PRESSURE: 56 MMHG | SYSTOLIC BLOOD PRESSURE: 144 MMHG | HEART RATE: 78 BPM | TEMPERATURE: 98 F | OXYGEN SATURATION: 98 %

## 2019-08-25 LAB
ANION GAP SERPL CALC-SCNC: 12 MMOL/L — SIGNIFICANT CHANGE UP (ref 5–17)
BUN SERPL-MCNC: 57 MG/DL — HIGH (ref 8–20)
CALCIUM SERPL-MCNC: 8.5 MG/DL — LOW (ref 8.6–10.2)
CHLORIDE SERPL-SCNC: 104 MMOL/L — SIGNIFICANT CHANGE UP (ref 98–107)
CO2 SERPL-SCNC: 27 MMOL/L — SIGNIFICANT CHANGE UP (ref 22–29)
CREAT SERPL-MCNC: 1.81 MG/DL — HIGH (ref 0.5–1.3)
GLUCOSE SERPL-MCNC: 96 MG/DL — SIGNIFICANT CHANGE UP (ref 70–115)
MAGNESIUM SERPL-MCNC: 2.1 MG/DL — SIGNIFICANT CHANGE UP (ref 1.6–2.6)
POTASSIUM SERPL-MCNC: 5.2 MMOL/L — SIGNIFICANT CHANGE UP (ref 3.5–5.3)
POTASSIUM SERPL-SCNC: 5.2 MMOL/L — SIGNIFICANT CHANGE UP (ref 3.5–5.3)
SODIUM SERPL-SCNC: 143 MMOL/L — SIGNIFICANT CHANGE UP (ref 135–145)

## 2019-08-25 PROCEDURE — 87641 MR-STAPH DNA AMP PROBE: CPT

## 2019-08-25 PROCEDURE — 85730 THROMBOPLASTIN TIME PARTIAL: CPT

## 2019-08-25 PROCEDURE — 82607 VITAMIN B-12: CPT

## 2019-08-25 PROCEDURE — 80053 COMPREHEN METABOLIC PANEL: CPT

## 2019-08-25 PROCEDURE — 84132 ASSAY OF SERUM POTASSIUM: CPT

## 2019-08-25 PROCEDURE — 71045 X-RAY EXAM CHEST 1 VIEW: CPT

## 2019-08-25 PROCEDURE — 83605 ASSAY OF LACTIC ACID: CPT

## 2019-08-25 PROCEDURE — 80048 BASIC METABOLIC PNL TOTAL CA: CPT

## 2019-08-25 PROCEDURE — 85610 PROTHROMBIN TIME: CPT

## 2019-08-25 PROCEDURE — 93005 ELECTROCARDIOGRAM TRACING: CPT

## 2019-08-25 PROCEDURE — 97163 PT EVAL HIGH COMPLEX 45 MIN: CPT

## 2019-08-25 PROCEDURE — 84484 ASSAY OF TROPONIN QUANT: CPT

## 2019-08-25 PROCEDURE — 82746 ASSAY OF FOLIC ACID SERUM: CPT

## 2019-08-25 PROCEDURE — 87640 STAPH A DNA AMP PROBE: CPT

## 2019-08-25 PROCEDURE — 96375 TX/PRO/DX INJ NEW DRUG ADDON: CPT

## 2019-08-25 PROCEDURE — 99285 EMERGENCY DEPT VISIT HI MDM: CPT | Mod: 25

## 2019-08-25 PROCEDURE — 99239 HOSP IP/OBS DSCHRG MGMT >30: CPT

## 2019-08-25 PROCEDURE — 99232 SBSQ HOSP IP/OBS MODERATE 35: CPT

## 2019-08-25 PROCEDURE — 96374 THER/PROPH/DIAG INJ IV PUSH: CPT

## 2019-08-25 PROCEDURE — 36415 COLL VENOUS BLD VENIPUNCTURE: CPT

## 2019-08-25 PROCEDURE — 83880 ASSAY OF NATRIURETIC PEPTIDE: CPT

## 2019-08-25 PROCEDURE — 85027 COMPLETE CBC AUTOMATED: CPT

## 2019-08-25 PROCEDURE — 83735 ASSAY OF MAGNESIUM: CPT

## 2019-08-25 RX ORDER — CARVEDILOL PHOSPHATE 80 MG/1
1 CAPSULE, EXTENDED RELEASE ORAL
Qty: 0 | Refills: 0 | DISCHARGE

## 2019-08-25 RX ORDER — LEVOTHYROXINE SODIUM 125 MCG
1 TABLET ORAL
Qty: 0 | Refills: 0 | DISCHARGE
Start: 2019-08-25

## 2019-08-25 RX ORDER — FUROSEMIDE 40 MG
1 TABLET ORAL
Qty: 0 | Refills: 0 | DISCHARGE
Start: 2019-08-25

## 2019-08-25 RX ORDER — FUROSEMIDE 40 MG
20 TABLET ORAL ONCE
Refills: 0 | Status: COMPLETED | OUTPATIENT
Start: 2019-08-25 | End: 2019-08-25

## 2019-08-25 RX ORDER — HYDRALAZINE HCL 50 MG
1 TABLET ORAL
Qty: 90 | Refills: 0
Start: 2019-08-25 | End: 2019-09-23

## 2019-08-25 RX ORDER — CARVEDILOL PHOSPHATE 80 MG/1
1 CAPSULE, EXTENDED RELEASE ORAL
Qty: 0 | Refills: 0 | DISCHARGE
Start: 2019-08-25

## 2019-08-25 RX ORDER — SPIRONOLACTONE 25 MG/1
1 TABLET, FILM COATED ORAL
Qty: 0 | Refills: 0 | DISCHARGE

## 2019-08-25 RX ORDER — CEPHALEXIN 500 MG
1 CAPSULE ORAL
Qty: 0 | Refills: 0 | DISCHARGE

## 2019-08-25 RX ADMIN — Medication 20 MILLIGRAM(S): at 13:25

## 2019-08-25 RX ADMIN — Medication 100 MILLIGRAM(S): at 13:21

## 2019-08-25 RX ADMIN — Medication 325 MILLIGRAM(S): at 13:25

## 2019-08-25 RX ADMIN — CARVEDILOL PHOSPHATE 6.25 MILLIGRAM(S): 80 CAPSULE, EXTENDED RELEASE ORAL at 05:54

## 2019-08-25 RX ADMIN — Medication 40 MILLIGRAM(S): at 05:55

## 2019-08-25 RX ADMIN — HEPARIN SODIUM 5000 UNIT(S): 5000 INJECTION INTRAVENOUS; SUBCUTANEOUS at 05:53

## 2019-08-25 RX ADMIN — Medication 100 MILLIGRAM(S): at 05:54

## 2019-08-25 RX ADMIN — CHLORHEXIDINE GLUCONATE 1 APPLICATION(S): 213 SOLUTION TOPICAL at 13:25

## 2019-08-25 RX ADMIN — HEPARIN SODIUM 5000 UNIT(S): 5000 INJECTION INTRAVENOUS; SUBCUTANEOUS at 13:21

## 2019-08-25 RX ADMIN — MUPIROCIN 1 APPLICATION(S): 20 OINTMENT TOPICAL at 05:53

## 2019-08-25 RX ADMIN — Medication 50 MICROGRAM(S): at 05:55

## 2019-08-25 RX ADMIN — ISOSORBIDE MONONITRATE 30 MILLIGRAM(S): 60 TABLET, EXTENDED RELEASE ORAL at 13:21

## 2019-08-25 NOTE — DISCHARGE NOTE PROVIDER - NSDCCPCAREPLAN_GEN_ALL_CORE_FT
PRINCIPAL DISCHARGE DIAGNOSIS  Diagnosis: Acute congestive heart failure, unspecified heart failure type  Assessment and Plan of Treatment:       SECONDARY DISCHARGE DIAGNOSES  Diagnosis: Aortic stenosis  Assessment and Plan of Treatment:

## 2019-08-25 NOTE — PROGRESS NOTE ADULT - ASSESSMENT
89 yo M presenting from [facility] for dyspnea. PMH HTN, hypothyroidism, HFpEF, chronic lymphedema presented with sob and increased edema.  Loop  diuretics were held due to worsening renal function.  Patient was maintained on aldactone only  Found to be hyperkalemic

## 2019-08-25 NOTE — DISCHARGE NOTE PROVIDER - CARE PROVIDER_API CALL
Molina Sharpe)  Cardiology; Internal Medicine  [address] [place] [address], [address] [id number]  Phone: [phone]  Fax: [phone]  Follow Up Time:

## 2019-08-25 NOTE — PROGRESS NOTE ADULT - PROBLEM SELECTOR PLAN 1
Severe aortic stenosis  Diuretics.  PO.    extra dose lasix before discharge.   ct current meds.  extra doses of IV lasix.

## 2019-08-25 NOTE — DISCHARGE NOTE NURSING/CASE MANAGEMENT/SOCIAL WORK - NSDCDPATPORTLINK_GEN_ALL_CORE
You can access the [facility] Patient Portal, offered by [facility], by registering with the following website: [email or web address]

## 2019-08-25 NOTE — PROGRESS NOTE ADULT - ATTENDING COMMENTS
Estimate date of discharge undetermined
heart failure with preserved ejection fraction fluid overload due to inadequate diuresis.  Diuresis stopped in rehab due to CKD.  restart diuresis. monitor Kidney funciton  stop aldactone.  Probabyl underlying coronary artery disease . Medcial therapy.  no intervention. patient DNR/DNI  will admit for diuresis.   If recurrent CHf admissions then plan for cardiomem.
No further in-patient cardiac work-up/management is needed.  Follow-up in cardiology office in 2 weeks.
congestive heart failure . PO torsemide and metolazone 2.5 mg every 3rd day. Extra dose of IV diuretics.  No further in-patient cardiac work-up/management is needed.  Follow-up in cardiology office in 2 weeks.

## 2019-08-25 NOTE — PROGRESS NOTE ADULT - SUBJECTIVE AND OBJECTIVE BOX
[facility]  [address], [place] [address]    REASON FOR VISIT:  Follow up on chf ->  aortic stenosis  UPDATE:  still edema.  Out of Bed to Chair   TELEMETRY MONITORING:  Off montior    ROS: cardiacL: no chestp ain  Res: Dyspnea improved  GI: no diarrhea.      0                         9.5    4.73  )-----------( 289      ( 24 Aug 2019 06:49 )             30.9   N=x    ; L=x        25 Aug 2019 06:49    143    |  104    |  57.0   ----------------------------<  96     5.2     |  27.0   |  1.81     Ca    8.5        25 Aug 2019 06:49  Mg     2.1       25 Aug 2019 06:49        MMEDICATIONS  (STANDING):  carvedilol 6.25 milliGRAM(s) Oral every 12 hours  furosemide    Tablet 40 milliGRAM(s) Oral two times a day  isosorbide   mononitrate ER Tablet (IMDUR) 30 milliGRAM(s) Oral daily    docusate sodium  aspirin enteric coated  chlorhexidine 2% Cloths  heparin  Injectable  levothyroxine    PRN: senna PRN      Vital Signs Last 24 Hrs  T(C): 36.8 (08-25-19 @ 13:29), Max: 36.8 (08-25-19 @ 08:10)  T(F): 98.2 (08-25-19 @ 13:29), Max: 98.2 (08-25-19 @ 08:10)  HR: 78 (08-25-19 @ 13:29) (67 - 78)  BP: 144/56 (08-25-19 @ 13:29) (144/56 - 163/62)  BP(mean): --  RR: 20 (08-25-19 @ 13:29) (18 - 20)  SpO2: 98% (08-25-19 @ 13:29) (98% - 98%)        Appears younger than his stated age  HEENT Head atraumatic eomi, oral mucosa moist  CV S1&S2 Normal   RESP  decreased breath sounds  GI  Soft active bowel sounds non tender  EXT  Legs wrapped  ++ edema bilateral lower ext with hemosiderin stains and chronic dermatitis  NEURO  Alert oriented  No gross motor or sensory deficits  LNs: cervical. NOt palpable.   < from: TTE Echo Complete w/Doppler (07.24.19 @ 09:21) >  Summary:   1. Left ventricular ejection fraction, by visual estimation, is 60 to   65%.   2. Normal global left ventricular systolic function.   3. LV Ejection Fraction by Roman's Method with a biplane EF of 68 %.   4. Mildly increased LV wall thickness.   5. Normal left ventricular internal cavity size.   6. Trivial pericardial effusion.   7. Peak transmitral mean gradient equals 5.6 mmHg, calculated mitral   valve area by pressure half time equals 2.20 cm² consistent with mild   mitral stenosis.   8. Severe mitral annular calcification.   9. Thickening of the anterior and posterior mitral valve leaflets.  10. Endocardial visualization was enhanced with intravenous echo contrast.  11. Mitral valve mean gradient is 5.6 mmHg consistent with mild mitral   stenosis.  12. Peak transaortic gradient equals 22.0 mmHg, mean transaortic gradient   equals 12.5 mmHg, the calculated aortic valve area equals 1.07 cm² by the   continuity equation consistent with mild aortic stenosis.  13. The aortic valve mean gradient is 12.5 mmHg consistent with mild   aortic stenosis.  14. There is severe aortic root calcification.

## 2019-08-25 NOTE — DISCHARGE NOTE PROVIDER - HOSPITAL COURSE
89M with a history of hypertension, hypothyroidism, lymphedema, and heart failure referred to the hospital from [facility] with dyspnea due to heart failure now improved        >Acute on chronic diastolic heart failure -     -improved    --> po lasix bid     -c/ardio followup as outpatient         >Chronic kidney disease III / Hyperkalemia -     Spironolactone held on admission.    - at baseline    NO MORE aldactone        >Hypothyroidism - On levothyroxine.        >Hypertension - stable             MOLST noted. DNR/DNI        time spent on dc 32 minutes

## 2020-04-30 NOTE — PATIENT PROFILE ADULT - HAVE YOU EXPERIENCED A TRAUMATIC EVENT?
[facility]  UROLOGY  ________________________________________________    OUTPATIENT NOTE    Patient: Zaheer Corral    : 1969  MRN: [id number]        CHIEF COMPLAINT: Hospital F/U ([facility] for kidney stones )        HISTORY OF PRESENT ILLNESS: Zaheer Corral is a 51 year old referred by [facility] for kidney stones on 20. Patient has no prior urologic history. He does have a family hx of kidney stones with his father. He is taking Tamsulosin. He has been straining his urine and passed one small stone later that afternoon. Denies any abdominal, flank or back pain today. Denies any urinary complaints.       REVIEW OF SYSTEMS:  Review of Systems   Constitutional: Negative.  Negative for appetite change, chills, fever and unexpected weight change.   HENT: Negative for congestion and rhinorrhea.    Eyes: Negative for visual disturbance.   Respiratory: Negative for cough and shortness of breath.    Cardiovascular: Negative for chest pain and leg swelling.   Gastrointestinal: Negative for abdominal pain, constipation, diarrhea and nausea.   Endocrine: Negative for polydipsia.   Genitourinary: Negative for flank pain, frequency, hematuria and urgency.   Musculoskeletal: Negative for joint swelling.   Skin: Negative for rash.   Allergic/Immunologic: Negative for immunocompromised state.   Neurological: Negative for dizziness and headaches.   Hematological: Does not bruise/bleed easily.   Psychiatric/Behavioral: Negative for confusion.     All systems are negative unless otherwise stated in the HPI        Past Medical History:   Diagnosis Date   • Essential (primary) hypertension    • Gout    • High cholesterol    • Kidney stone    • Kidney stone        Past Surgical History:   Procedure Laterality Date   • No past surgeries         Family History   Problem Relation Age of Onset   • Heart disease Father    • Urolithiasis Father    • Heart disease Paternal Grandfather          Tobacco  Use: Never             Alcohol Use: Yes                Comment: social      Drug Use:    No                  MEDICATIONS  Current Outpatient Medications   Medication Sig Dispense Refill   • allopurinol (ZYLOPRIM) 300 MG tablet TK 1 T PO QD     • atorvastatin (LIPITOR) 10 MG tablet TK 1 T PO QD     • colchicine (COLCRYS) 0.6 MG tablet      • HYDROcodone-acetaminophen (NORCO) 5-325 MG per tablet TK 1 T PO Q 4 H X 3 DAYS PRN P     • ibuprofen (MOTRIN) 600 MG tablet TK 1 T PO QID FOR 7 DAYS PRN P     • metoPROLOL tartrate (LOPRESSOR) 25 MG tablet TAKE 1 T PO BID     • sulfamethoxazole-trimethoprim (BACTRIM DS) 800-160 MG per tablet TK ONE T PO Q 12 H FOR  7 DAYS.     • tamsulosin (FLOMAX) 0.4 MG Cap TK 1 C PO QD X 7 DAYS       No current facility-administered medications for this visit.      Medications were reviewed and updated today.      ALLERGIES  ALLERGIES:   Allergen Reactions   • Penicillins HIVES           Physical Exam   Constitutional: He is oriented to person, place, and time. He appears well-developed and well-nourished.   HENT:   Head: Normocephalic and atraumatic.   Right Ear: External ear normal.   Left Ear: External ear normal.   Eyes: Pupils are equal, round, and reactive to light.   Neck: Normal range of motion.   Cardiovascular: Normal rate, regular rhythm and normal heart sounds.   Pulmonary/Chest: Effort normal and breath sounds normal.   Abdominal: Soft. He exhibits no distension. There is no abdominal tenderness. There is no rebound.   Musculoskeletal: Normal range of motion.         General: No tenderness or edema.   Neurological: He is alert and oriented to person, place, and time.   Skin: Skin is warm. No rash noted. No erythema. No pallor.      Visit Vitals  /84   Pulse 82   Temp 98.2 °F (36.8 °C)   Resp 16   Ht 5' 7\" (1.702 m)   Wt 74.8 kg (165 lb)   BMI 25.84 kg/m²        _______________________________    LABORATORY  Results for orders placed or performed in visit on 04/30/20   POCT  URINE DIP AUTO   Result Value    POCT Color Yellow    POCT Appearance Clear    POCT Glucose Urine Negative    POCT Bilirubin Negative    POCT Ketones Negative    POCT Specific Griffith 1.010    POCT Occult Blood Moderate    POCT pH 5.5    POCT Protein Negative    POCT Urobilinogen 0.2    Urine Nitrite Negative    WBC (Leukocyte) Esterase POC Negative       Office Visit on 04/30/2020   Component Date Value Ref Range Status   • POCT Color 04/30/2020 Yellow   Final   • POCT Appearance 04/30/2020 Clear   Final   • POCT Glucose Urine 04/30/2020 Negative  Negative Final   • POCT Bilirubin 04/30/2020 Negative  Negative Final   • POCT Ketones 04/30/2020 Negative  Negative Final   • POCT Specific Gravity 04/30/2020 1.010  1.005 - 1.03 Final   • POCT Occult Blood 04/30/2020 Moderate  Negative Final   • POCT pH 04/30/2020 5.5  5 - 7 Final   • POCT Protein 04/30/2020 Negative  Negative Final   • POCT Urobilinogen 04/30/2020 0.2  0 - 1 mg/dL Final   • Urine Nitrite 04/30/2020 Negative  Negative Final   • WBC (Leukocyte) Esterase POC 04/30/2020 Negative  Negative Final           _______________________________    PATHOLOGY:      _______________________________    IMAGING:               Result type: CT Abdomen + Pelvis w / Contrast  Result date: April 28, 2020 8:07   Result status: Auth (Verified)  Result title: CT Abdomen + Pelvis w / Contrast  Performed by: Letty Bass on April 28, 2020 8:07   Verified by: Claudio Jones MD on April 28, 2020 8:34   Encounter info: [id number], , Emergency, 4/28/2020 - 4/28/2020    * Final Report *    Reason For Exam  abd pain    Report  PROCEDURE:  CT Abdomen + Pelvis w / Contrast    COMPARISON: None    INDICATIONS: abd pain, vomiting    TECHNIQUES: Multiple axial images of CT Abdomen + Pelvis w / Contrast  were obtained. 75 cc of Omnipaque was infused.  Multiplanar  reconstructed images were also acquired. Adjustment of the mA and/or  kV was done based on the patient's size and  age.    FINDINGS: The liver shows mildly decreased attenuation without mass,  abnormal enhancement or intrahepatic ductal dilatation.  There is a 1  cm cyst in the inferior lateral aspect of the right hepatic lobe  laterally which is too small to be characterized.  The gallbladder,  pancreas, spleen, and adrenal glands unremarkable.     The kidneys are normal in size, shape and position.  There is  suggestion of a 2-3 mm obstructing calculus in the distal right ureter  with mild right hydronephrosis, hydroureter, perinephric fat stranding  and decreased renal perfusion.  The left kidney shows lobulated  contour and focal cortical thinning which is most likely due to prior  obstruction/infection.  A punctate nonobstructing calculus is present  in the upper pole of the left renal pelvis.  A 1.5 cm cyst is seen in  the left renal cortex anteriorly.  There is no discrete renal mass on  either side.  The bladder is decompressed which limits the evaluation.   The prostate is borderline size with rectal dimension measuring 4.9  cm and demonstrating heterogenous enhancement which is nonspecific.       There is a small hiatal hernia with mild circumferential wall  thickening of distal esophagus.  Small bowel loops are unremarkable.   Appendix has normal appearance.  The large bowel loops are also  unremarkable as far as seen.     There is no ascites or free air in the abdomen or pelvis.  Small  lymph nodes in the mesentery and retroperitoneal spaces nonspecific.    The lower lungs are without acute consolidations.  The osseous  structures are unremarkable for patient's age.  The well marginated  sclerotic focus in the medial aspect of the right superior ramus of  pubis is nonspecific and may represent a normal island.    IMPRESSION:   1.  Findings are compatible with a 2-3 mm obstructing calculus in the  distal right ureter.  2.  A punctate nonobstructing calculus in the upper pole of the left  kidney; small renal cysts.  3.   Borderline size of prostate with heterogeneous enhancement.  4.  Normal appendix.  No bowel traction.  5.  Mild fatty infiltration of liver with a small cysts in the right  hepatic lobe without mass or abnormal enhancement.  6.  Small hiatal hernia and mild distal esophageal circumferential  wall thickening.  7.  Please see detailed discussion.        _______________________________    ASSESSMENT & PLAN  1. Kidney stone    2. Special screening for malignant neoplasm of prostate      - Patient's kidney stone will be sent for stone analysis, will review findings at his next f/u appt.     - Patient will continue taking his Allopurinol for gout and now his kidney stones.     - Will have patient complete 24hr urine and return to office to discuss results    - Patient will complete a PSA screening     - Pt can d/c  Tamsulosin & Bactrim DS due to no sign of infection. UA was completely normal except showing some moderate blood. Will recheck at his next f/u to make sure it has resolved.         Orders Placed This Encounter   • Calculi Analysis     Order Specific Question:   Specimen Type     Answer:   Kidney Stone   • PSA   • Stonerisk Diagnostic Profile     Pt to do 1 24hr urine stone risk test now and the second one in 6 months   • POCT Urine Dip Auto          Return in about 2 months (around 6/30/2020) for UA recheck and 24hr urine and stone risk results. .      ____________________________________________________________      Scribe Signature:  I, Herminia Patterson, personally scribed the services dictated to me by Sg Vang M.D. in this documentation.     no

## 2021-05-15 NOTE — DISCHARGE NOTE NURSING/CASE MANAGEMENT/SOCIAL WORK - NSCORESITESY/N_GEN_A_CORE_RD
Patient Information    Radiology -- Please call [phone] to schedule your Pelvic Ultrasound prior to July 2021     Follow Up  -- Follow up with your regular Primary Care Provider in July as scheduled.   -- You have been referred to Pulmonary [phone]. Please call if you have not heard from that department in 3 days.     Additional Educational Resources:  For additional resources regarding your symptoms, diagnosis, or further health information, please visit the Health Resources section on [email or web address] or the Online Health Resources section in [email or web address].      
No

## 2021-05-27 NOTE — ED ADULT NURSE NOTE - HARM RISK FACTORS
Pt was advised of INR instructions to continue on 8 mg daily and to recheck on 6-2-21 per Dr. Reynold Falcon orders today. Pt verbalized orders and pt given printed INR schedule. no

## 2021-12-07 NOTE — H&P ADULT - NEGATIVE GENERAL SYMPTOMS
Physical Therapy    Referred by: Connie Robbins MD; Medical Diagnosis (from order):    Diagnosis Information      Diagnosis    781.2 (ICD-9-CM) - R26.9 (ICD-10-CM) - Abnormality of gait and mobility    728.9 (ICD-9-CM) - M62.89 (ICD-10-CM) - Hamstring tightness    727.81 (ICD-9-CM) - M24.559 (ICD-10-CM) - Hip flexor tightness, unspecified laterality                Visit Type: Daily Treatment Note  Visit:  20     SUBJECTIVE                                                                                                             Present and reporting subjective information: mother  Mom states that Joselito has been doing well. She states that he will keep his feet flat when she tells him to, but otherwise he still walks on his toes. He will be getting his braces on December 16th.     doggyloot video  used during today's session.   number: [id number]    OBJECTIVE                                                                                                                    Observation:     Joselito ambulates with heel contact and a flat foot gait pattern 25% of the time today.               TREATMENT                                                                                                                  Therapeutic Exercise:  Single leg stance with front foot on step with upper extremity reaches  Squatting to retrieve items from the ground with manual cues for heel contact and symmetrical weight bearing  Standing on vibration plate with manual cues for heel contact while performing ball toss  Gastrocnemius stretch for 30 seconds x3 each  Active dorsiflexion on even terrain and on vibration plate      Skilled input: verbal instruction/cues and facilitation    Home Exercise Program:   Access Code: [email or web address]  URL: [email or web address]  Date: 05/20/2021  Prepared by: Nallely Segura  •Supine Hamstring Stretch with Caregiver - 1 x daily - 7 x weekly - 2 sets - 30  hold  •Supine Ankle Dorsiflexion Stretch with Caregiver - 1 x daily - 7 x weekly - 2 sets - 30 hold  •Supine Hip Flexor Stretch with Caregiver - 1 x daily - 7 x weekly - 2 sets - 30 hold  •Supine Bridge - 1 x daily - 7 x weekly - 2 sets - 10 reps    Added: single leg stance on right lower extremity        ASSESSMENT                                                                                                               Joselito had difficulty following directions and participating in therapy today. He required frequent physical redirection to improve his safety throughout the session. He demonstrated heel contact when given manual or verbal cues today, but without any cues, he demonstrated toe-walking throughout the session. Joselito will benefit from continued physical therapy services in order to continue making progress in his range of motion, strength, alignment, and gait mechanics.To date the patient has made gains as expected as reported.  Child/Caregiver Education:   Results of above outlined education: Verbalizes understanding and Demonstrates understanding      PLAN                                                                                                                           Suggestions for next session as indicated: Progress per plan of care, joint mobilizations, passive stretches, hip strengthening, heel contact in standing with wedges, use of vibration plate for proprioceptive input, single leg stance activities, squatting activities with heel contact           Therapy procedure time and total treatment time can be found documented on the Time Entry flowsheet   no fever/no chills

## 2022-05-16 ENCOUNTER — INPATIENT (INPATIENT)
Facility: HOSPITAL | Age: 87
LOS: 7 days | Discharge: NURSING, REHAB OR LONG-TERM CARE | DRG: 291 | End: 2022-05-24
Attending: STUDENT IN AN ORGANIZED HEALTH CARE EDUCATION/TRAINING PROGRAM | Admitting: STUDENT IN AN ORGANIZED HEALTH CARE EDUCATION/TRAINING PROGRAM
Payer: MEDICARE

## 2022-05-16 VITALS
SYSTOLIC BLOOD PRESSURE: 179 MMHG | OXYGEN SATURATION: 93 % | DIASTOLIC BLOOD PRESSURE: 80 MMHG | TEMPERATURE: 98 F | RESPIRATION RATE: 20 BRPM | HEART RATE: 82 BPM

## 2022-05-16 DIAGNOSIS — J81.1 CHRONIC PULMONARY EDEMA: ICD-10-CM

## 2022-05-16 DIAGNOSIS — Z98.890 OTHER SPECIFIED POSTPROCEDURAL STATES: Chronic | ICD-10-CM

## 2022-05-16 DIAGNOSIS — I35.0 NONRHEUMATIC AORTIC (VALVE) STENOSIS: ICD-10-CM

## 2022-05-16 DIAGNOSIS — I50.31 ACUTE DIASTOLIC (CONGESTIVE) HEART FAILURE: ICD-10-CM

## 2022-05-16 DIAGNOSIS — I10 ESSENTIAL (PRIMARY) HYPERTENSION: ICD-10-CM

## 2022-05-16 LAB
ALBUMIN SERPL ELPH-MCNC: 3.9 G/DL — SIGNIFICANT CHANGE UP (ref 3.3–5.2)
ALP SERPL-CCNC: 84 U/L — SIGNIFICANT CHANGE UP (ref 40–120)
ALT FLD-CCNC: 13 U/L — SIGNIFICANT CHANGE UP
ANION GAP SERPL CALC-SCNC: 16 MMOL/L — SIGNIFICANT CHANGE UP (ref 5–17)
ANION GAP SERPL CALC-SCNC: 16 MMOL/L — SIGNIFICANT CHANGE UP (ref 5–17)
AST SERPL-CCNC: 31 U/L — SIGNIFICANT CHANGE UP
BASOPHILS # BLD AUTO: 0.05 K/UL — SIGNIFICANT CHANGE UP (ref 0–0.2)
BASOPHILS NFR BLD AUTO: 0.6 % — SIGNIFICANT CHANGE UP (ref 0–2)
BILIRUB SERPL-MCNC: 0.5 MG/DL — SIGNIFICANT CHANGE UP (ref 0.4–2)
BUN SERPL-MCNC: 44.7 MG/DL — HIGH (ref 8–20)
BUN SERPL-MCNC: 45 MG/DL — HIGH (ref 8–20)
CALCIUM SERPL-MCNC: 9.1 MG/DL — SIGNIFICANT CHANGE UP (ref 8.6–10.2)
CALCIUM SERPL-MCNC: 9.2 MG/DL — SIGNIFICANT CHANGE UP (ref 8.6–10.2)
CHLORIDE SERPL-SCNC: 102 MMOL/L — SIGNIFICANT CHANGE UP (ref 98–107)
CHLORIDE SERPL-SCNC: 104 MMOL/L — SIGNIFICANT CHANGE UP (ref 98–107)
CO2 SERPL-SCNC: 17 MMOL/L — LOW (ref 22–29)
CO2 SERPL-SCNC: 21 MMOL/L — LOW (ref 22–29)
CREAT SERPL-MCNC: 1.87 MG/DL — HIGH (ref 0.5–1.3)
CREAT SERPL-MCNC: 1.99 MG/DL — HIGH (ref 0.5–1.3)
EGFR: 28 ML/MIN/1.73M2 — LOW
EGFR: 30 ML/MIN/1.73M2 — LOW
EOSINOPHIL # BLD AUTO: 0.06 K/UL — SIGNIFICANT CHANGE UP (ref 0–0.5)
EOSINOPHIL NFR BLD AUTO: 0.7 % — SIGNIFICANT CHANGE UP (ref 0–6)
FLUAV AG NPH QL: SIGNIFICANT CHANGE UP
FLUBV AG NPH QL: SIGNIFICANT CHANGE UP
GLUCOSE SERPL-MCNC: 107 MG/DL — HIGH (ref 70–99)
GLUCOSE SERPL-MCNC: 129 MG/DL — HIGH (ref 70–99)
HCT VFR BLD CALC: 44.7 % — SIGNIFICANT CHANGE UP (ref 39–50)
HGB BLD-MCNC: 13.9 G/DL — SIGNIFICANT CHANGE UP (ref 13–17)
IMM GRANULOCYTES NFR BLD AUTO: 0.3 % — SIGNIFICANT CHANGE UP (ref 0–1.5)
LYMPHOCYTES # BLD AUTO: 0.82 K/UL — LOW (ref 1–3.3)
LYMPHOCYTES # BLD AUTO: 9.3 % — LOW (ref 13–44)
MCHC RBC-ENTMCNC: 31.1 GM/DL — LOW (ref 32–36)
MCHC RBC-ENTMCNC: 32.1 PG — SIGNIFICANT CHANGE UP (ref 27–34)
MCV RBC AUTO: 103.2 FL — HIGH (ref 80–100)
MONOCYTES # BLD AUTO: 0.99 K/UL — HIGH (ref 0–0.9)
MONOCYTES NFR BLD AUTO: 11.3 % — SIGNIFICANT CHANGE UP (ref 2–14)
NEUTROPHILS # BLD AUTO: 6.85 K/UL — SIGNIFICANT CHANGE UP (ref 1.8–7.4)
NEUTROPHILS NFR BLD AUTO: 77.8 % — HIGH (ref 43–77)
NT-PROBNP SERPL-SCNC: HIGH PG/ML (ref 0–300)
PLATELET # BLD AUTO: 225 K/UL — SIGNIFICANT CHANGE UP (ref 150–400)
POTASSIUM SERPL-MCNC: 4.9 MMOL/L — SIGNIFICANT CHANGE UP (ref 3.5–5.3)
POTASSIUM SERPL-MCNC: 6.8 MMOL/L — CRITICAL HIGH (ref 3.5–5.3)
POTASSIUM SERPL-SCNC: 4.9 MMOL/L — SIGNIFICANT CHANGE UP (ref 3.5–5.3)
POTASSIUM SERPL-SCNC: 6.8 MMOL/L — CRITICAL HIGH (ref 3.5–5.3)
PROT SERPL-MCNC: 7.9 G/DL — SIGNIFICANT CHANGE UP (ref 6.6–8.7)
RBC # BLD: 4.33 M/UL — SIGNIFICANT CHANGE UP (ref 4.2–5.8)
RBC # FLD: 13.1 % — SIGNIFICANT CHANGE UP (ref 10.3–14.5)
RSV RNA NPH QL NAA+NON-PROBE: SIGNIFICANT CHANGE UP
SARS-COV-2 RNA SPEC QL NAA+PROBE: SIGNIFICANT CHANGE UP
SODIUM SERPL-SCNC: 137 MMOL/L — SIGNIFICANT CHANGE UP (ref 135–145)
SODIUM SERPL-SCNC: 139 MMOL/L — SIGNIFICANT CHANGE UP (ref 135–145)
WBC # BLD: 8.8 K/UL — SIGNIFICANT CHANGE UP (ref 3.8–10.5)
WBC # FLD AUTO: 8.8 K/UL — SIGNIFICANT CHANGE UP (ref 3.8–10.5)

## 2022-05-16 PROCEDURE — 99223 1ST HOSP IP/OBS HIGH 75: CPT

## 2022-05-16 PROCEDURE — 71045 X-RAY EXAM CHEST 1 VIEW: CPT | Mod: 26,77

## 2022-05-16 PROCEDURE — 99285 EMERGENCY DEPT VISIT HI MDM: CPT

## 2022-05-16 PROCEDURE — 93010 ELECTROCARDIOGRAM REPORT: CPT

## 2022-05-16 PROCEDURE — 71045 X-RAY EXAM CHEST 1 VIEW: CPT | Mod: 26

## 2022-05-16 RX ORDER — ISOSORBIDE MONONITRATE 60 MG/1
30 TABLET, EXTENDED RELEASE ORAL DAILY
Refills: 0 | Status: DISCONTINUED | OUTPATIENT
Start: 2022-05-16 | End: 2022-05-24

## 2022-05-16 RX ORDER — FUROSEMIDE 40 MG
60 TABLET ORAL EVERY 12 HOURS
Refills: 0 | Status: DISCONTINUED | OUTPATIENT
Start: 2022-05-16 | End: 2022-05-17

## 2022-05-16 RX ORDER — LANOLIN ALCOHOL/MO/W.PET/CERES
3 CREAM (GRAM) TOPICAL AT BEDTIME
Refills: 0 | Status: DISCONTINUED | OUTPATIENT
Start: 2022-05-16 | End: 2022-05-24

## 2022-05-16 RX ORDER — FUROSEMIDE 40 MG
60 TABLET ORAL ONCE
Refills: 0 | Status: COMPLETED | OUTPATIENT
Start: 2022-05-16 | End: 2022-05-16

## 2022-05-16 RX ORDER — ALBUTEROL 90 UG/1
1 AEROSOL, METERED ORAL EVERY 6 HOURS
Refills: 0 | Status: DISCONTINUED | OUTPATIENT
Start: 2022-05-16 | End: 2022-05-24

## 2022-05-16 RX ORDER — LEVOTHYROXINE SODIUM 125 MCG
50 TABLET ORAL DAILY
Refills: 0 | Status: DISCONTINUED | OUTPATIENT
Start: 2022-05-16 | End: 2022-05-24

## 2022-05-16 RX ORDER — IPRATROPIUM/ALBUTEROL SULFATE 18-103MCG
3 AEROSOL WITH ADAPTER (GRAM) INHALATION
Refills: 0 | Status: COMPLETED | OUTPATIENT
Start: 2022-05-16 | End: 2022-05-16

## 2022-05-16 RX ORDER — CARVEDILOL PHOSPHATE 80 MG/1
6.25 CAPSULE, EXTENDED RELEASE ORAL EVERY 12 HOURS
Refills: 0 | Status: DISCONTINUED | OUTPATIENT
Start: 2022-05-16 | End: 2022-05-18

## 2022-05-16 RX ORDER — HEPARIN SODIUM 5000 [USP'U]/ML
5000 INJECTION INTRAVENOUS; SUBCUTANEOUS EVERY 12 HOURS
Refills: 0 | Status: DISCONTINUED | OUTPATIENT
Start: 2022-05-16 | End: 2022-05-24

## 2022-05-16 RX ORDER — FUROSEMIDE 40 MG
40 TABLET ORAL EVERY 12 HOURS
Refills: 0 | Status: DISCONTINUED | OUTPATIENT
Start: 2022-05-16 | End: 2022-05-16

## 2022-05-16 RX ORDER — ACETAMINOPHEN 500 MG
650 TABLET ORAL EVERY 6 HOURS
Refills: 0 | Status: DISCONTINUED | OUTPATIENT
Start: 2022-05-16 | End: 2022-05-24

## 2022-05-16 RX ADMIN — CARVEDILOL PHOSPHATE 6.25 MILLIGRAM(S): 80 CAPSULE, EXTENDED RELEASE ORAL at 21:40

## 2022-05-16 RX ADMIN — Medication 40 MILLIGRAM(S): at 12:10

## 2022-05-16 RX ADMIN — HEPARIN SODIUM 5000 UNIT(S): 5000 INJECTION INTRAVENOUS; SUBCUTANEOUS at 21:40

## 2022-05-16 RX ADMIN — ISOSORBIDE MONONITRATE 30 MILLIGRAM(S): 60 TABLET, EXTENDED RELEASE ORAL at 22:58

## 2022-05-16 RX ADMIN — Medication 60 MILLIGRAM(S): at 17:46

## 2022-05-16 NOTE — ED PROVIDER NOTE - OBJECTIVE STATEMENT
MARQUITA FAULKNER is a 89yo M with PMH CHF, HTN, hypothyroid BIBEMS in c/o RESP DISTRESS x 2 days a/w mild cough and dizziness. Increasing O2 requirement, pt using 2LNC while not usually using any supplemental O2. He denies any other associated symptoms specifically denying CP, fevers, urinary symptoms, abdominal pain. Pt is not vaccinated for covid or flu. Was treated w/ duonebs in route w/ improvement in SOB.

## 2022-05-16 NOTE — H&P ADULT - ASSESSMENT
89 y/o M w/ PMH of HFpEF (last known EF 2019 was 65%), AS, HTN, hypothyroid, chronic lymphedema, CKD IV came in c/o of sob that has been worsening the past few days.  Requiring supplemental O2 on arrival to maintain O2 sats.  Notable increased WOB and volume overloaded on exam.  BNP 17K and CXR w/ mild b/l pleural effusion.  s/p 60mg IV lasix in ED w/ improved symptoms.  Pt will be admitted for acute on chronic HFpEF.        Acute hypoxic respiratory failure likely 2/2 acute decompensated HFpEF  - Admit to telemetry/pulse ox   - Clinically w/ increased wob requiring supplemental O2, on 4L at this time   - CXR personally reviewed and noted to have blunting of costophrenic angles and BNP 17K could be 2/2 CHF exacerbation   - s/p 60mg IV lasix in ED w/ some improvement in wob and sob, will c/w IV lasix BID   - Will order serial troponins and EKG  - Will order CT chest to better assess pulmonary status    - Low supsision for PE given pt improved w/ lasix and no rt heart strain on EKG however will order b/l LE doppler and TTE     - Unable to do CTA chest given Cr and can not    89 y/o M w/ PMH of HFpEF (last known EF 2019 was 65%), AS, HTN, hypothyroid, chronic lymphedema, CKD IV came in c/o of sob that has been worsening the past few days.  Requiring supplemental O2 on arrival to maintain O2 sats.  Notable increased WOB and volume overloaded on exam.  BNP 17K and CXR w/ mild b/l pleural effusion.  s/p 60mg IV lasix in ED w/ improved symptoms.  Pt will be admitted for acute on chronic HFpEF.        Acute hypoxic respiratory failure likely 2/2 acute decompensated HFpEF  - Admit to telemetry/pulse ox   - Clinically w/ increased wob requiring supplemental O2, on 4L at this time   - CXR personally reviewed and noted to have blunting of costophrenic angles and BNP 17K could be 2/2 CHF exacerbation   - s/p 60mg IV lasix in ED w/ some improvement in wob and sob, will c/w IV lasix BID   - Will order serial troponins and EKG  - Low suspicion for PE given pt improved w/ lasix and no rt heart strain on EKG however will order b/l LE doppler and TTE     - Unable to do CTA chest given Cr and pt unable to lay flat for VQ scan  - Daily weights, strict I/O's and fluid restrict to 1L   - Maintain K>4 and Mg>2  - Monitor on telemetry   - Cardiology consulted for further recs       Hyperkalemia   - No EKG changes   - Stat repeat BMP to reassess and if elevated give lokelma  - Lasix will also help lower dose   - Monitor on telemetry  - Maintain K~4      CKD IV / AGMA  - Baseline Cr 1.8-2  - Uremia likely contributing to AG but will order LA and ABG  - Monitor renal fxn and lytes  - Avoid nephrotoxic meds or renally dose if needed       Essential HTN  - Resume home medications       Hypothyroid   - Resume synthroid       VTE ppx: heparin sq

## 2022-05-16 NOTE — CONSULT NOTE ADULT - NS ATTEND AMEND GEN_ALL_CORE FT
congestive heart failure . diastolic heart failure.  ct IV lasix.   CKD.   Transthoracic echocardiogram. conservative management due to Advanced age.

## 2022-05-16 NOTE — CONSULT NOTE ADULT - SUBJECTIVE AND OBJECTIVE BOX
[facility]                                              CARDIOLOGY AT [place] [address]                                             Telephone: [phone]. Fax:[phone]                                                       CARDIOLOGY CONSULTATION NOTE                                                                                             History obtained by: Patient and medical record  Community Cardiologist: None   obtained: Yes [  ] No [ x ]  Reason for Consultation: CHF  Available out pt records reviewed: Yes [ x ] No [  ]    Chief complaint:    Patient is a 89y old  Male who presents with a chief complaint of shortness of breath.     HPI: Patient is a 89 year old male with a PMHx of HFpEF (60-65% 2019), aortic stenosis, chronic lymphedema, HTN, CKD, and hypothyroidism who presented to the ED with complaints of dyspnea. Patient states he never followed up with a Caridologist after 2019, and he no longer has a PMD since he is unable to leave the house. Patient states that for the last few months on and off he has had episodes of dyspnea on exertion, and his legs have been chronically swollen for years. Patient states that this morning he was acutely short of breath at rest so he came to the ER to be evaluated. Patient denies any fevers, chills, CP, syncope, near syncope, abdominal pain, N/V/D, headache, or dizziness.       CARDIAC TESTING   ECHO:  < from: TTE Echo Complete w/Doppler (07.24.19 @ 09:21) >  PHYSICIAN INTERPRETATION:  Left Ventricle: Endocardial visualization was enhanced with intravenous   echo contrast. The left ventricular internal cavity size is normal. Left   ventricular wall thickness is mildly increased.  Global LV systolic function was normal. Left ventricular ejection   fraction, by visual estimation, is 60 to 65%.  Right Ventricle: The right ventricle was not well visualized.  Left Atrium: Normal left atrial size.  Right Atrium: The right atrium was not well visualized.  Pericardium: Trivial pericardial effusion is present.  Mitral Valve: Thickening of the anterior and posterior mitral valve   leaflets. There is severe mitral annular calcification. Peak transmitral   mean gradient equals 5.6 mmHg, calculated mitral valve area by pressure   half time equals 2.20 cm² consistent with mild mitral stenosis. Mitral   valve mean gradient is 5.6 mmHg consistent with mild mitral stenosis.   Trace mitral valve regurgitation is seen.  Tricuspid Valve: The tricuspid valve is not well seen. Adequate TR   velocity was not obtained to accurately assess RVSP.  Aortic Valve: Peak transaortic gradient equals 22.0 mmHg, mean   transaortic gradient equals 12.5 mmHg, the calculated aortic valve area   equals 1.07 cm² by the continuity equation consistent with mild aortic   stenosis. The aortic valve mean gradient is 12.5 mmHg consistent with   mild aortic stenosis. Trivial aortic valve regurgitation is seen.  Pulmonic Valve: The pulmonic valve was not well visualized.  Aorta: There is severe aortic root calcification.  Pulmonary Artery: The pulmonary artery is not well seen.  Venous: The pulmonary veins were not well visualized. The inferior vena   cava was normal sized, with respiratory size variation greater than 50%.       Summary:   1. Left ventricular ejection fraction, by visual estimation, is 60 to   65%.   2. Normal global left ventricular systolic function.   3. LV Ejection Fraction by Roman's Method with a biplane EF of 68 %.   4. Mildly increased LV wall thickness.   5. Normal left ventricular internal cavity size.   6. Trivial pericardial effusion.   7. Peak transmitral mean gradient equals 5.6 mmHg, calculated mitral   valve area by pressure half time equals 2.20 cm² consistent with mild   mitral stenosis.   8. Severe mitral annular calcification.   9. Thickening of the anterior and posterior mitral valve leaflets.  10. Endocardial visualization was enhanced with intravenous echo contrast.  11. Mitral valve mean gradient is 5.6 mmHg consistent with mild mitral   stenosis.  12. Peak transaortic gradient equals 22.0 mmHg, mean transaortic gradient   equals 12.5 mmHg, the calculated aortic valve area equals 1.07 cm² by the   continuity equation consistent with mild aortic stenosis.  13. The aortic valve mean gradient is 12.5 mmHg consistent with mild   aortic stenosis.  14. There is severe aortic root calcification.    MD Tremaine Electronically signed on 7/24/2019 at 12:24:53 PM    < end of copied text >      PAST MEDICAL HISTORY  CHF (congestive heart failure)    HTN (hypertension)    Hypothyroidism, unspecified type    Prostate enlargement        PAST SURGICAL HISTORY  No significant past surgical history    History of prostate surgery        SOCIAL HISTORY:  Lives alone  CIGARETTES:   Former smoker.  ALCOHOL: Drinks 2 glasses of liquor a day  DRUGS: Denies    FAMILY HISTORY:  FH: HTN (hypertension)      Family History of Cardiovascular Disease:  Yes [  ] No [  ]  Coronary Artery Disease in first degree relative: Yes [  ] No [  ]  Sudden Cardiac Death in First degree relative: Yes [  ] No [  ]    HOME MEDICATIONS:  carvedilol 6.25 mg oral tablet: 1 tab(s) orally every 12 hours (25 Aug 2019 12:08)  furosemide 40 mg oral tablet: 1 tab(s) orally 2 times a day (25 Aug 2019 12:08)  isosorbide mononitrate 30 mg oral tablet, extended release: 1 tab(s) orally once a day (19 Aug 2019 13:13)  levothyroxine 50 mcg (0.05 mg) oral tablet: 1 tab(s) orally once a day (25 Aug 2019 12:08)      CURRENT CARDIAC MEDICATIONS:  furosemide   Injectable 60 milliGRAM(s) IV Push Once, Stop order after: 1 Doses      CURRENT OTHER MEDICATIONS:  acetaminophen     Tablet .. 650 milliGRAM(s) Oral every 6 hours PRN Temp greater or equal to 38C (100.4F), Mild Pain (1 - 3)  melatonin 3 milliGRAM(s) Oral at bedtime PRN Insomnia  aluminum hydroxide/magnesium hydroxide/simethicone Suspension 30 milliLiter(s) Oral every 4 hours PRN Dyspepsia      ALLERGIES:   No Known Allergies      REVIEW OF SYMPTOMS:   CONSTITUTIONAL: No fever, no chills, no weight loss, no weight gain, no fatigue   ENMT:  No vertigo; No sinus or throat pain  NECK: No pain or stiffness  CARDIOVASCULAR: AS PER HPI  RESPIRATORY: AS PER HPI  : No dysuria, no hematuria   GI: No dark color stool, no nausea, no diarrhea, no constipation, no abdominal pain   NEURO: No headache, no slurred speech   MUSCULOSKELETAL: No joint pain or swelling; No muscle, back, or extremity pain  PSYCH: No agitation, no anxiety.    ALL OTHER REVIEW OF SYSTEMS ARE NEGATIVE.    VITAL SIGNS:  T(C): 36.7 (05-16-22 @ 15:41), Max: 36.8 (05-16-22 @ 11:08)  T(F): 98 (05-16-22 @ 15:41), Max: 98.2 (05-16-22 @ 11:08)  HR: 75 (05-16-22 @ 15:41) (73 - 82)  BP: 172/78 (05-16-22 @ 15:41) (113/64 - 179/80)  RR: 20 (05-16-22 @ 15:41) (20 - 26)  SpO2: 96% (05-16-22 @ 15:41) (93% - 98%)    INTAKE AND OUTPUT:       PHYSICAL EXAM:  Constitutional: Comfortable . No acute distress.   HEENT: Atraumatic and normocephalic , neck is supple . + JVD. No carotid bruit.  CNS: A&Ox3. No focal deficits.   Respiratory: Diffuse bilateral rales  Cardiovascular: RRR normal s1 s2.  No rubs or gallop. III/VI systolic ejection murmur RUSB  Gastrointestinal: Soft, non-tender. +Bowel sounds.   Extremities: 2+ Peripheral Pulses, No clubbing, cyanosis, 3+++ b/l LE edema  Psychiatric: Calm . no agitation.   Skin: Warm and dry, no ulcers on extremities     LABS:  ( 16 May 2022 12:22 )  Troponin T  X    ,  CPK  X    , CKMB  X    , BNP 44241<H>                              13.9   8.80  )-----------( 225      ( 16 May 2022 12:21 )             44.7     05-16    137  |  104  |  45.0<H>  ----------------------------<  107<H>  6.8<HH>   |  17.0<L>  |  1.99<H>    Ca    9.1      16 May 2022 13:42    TPro  7.9  /  Alb  3.9  /  TBili  0.5  /  DBili  x   /  AST  31  /  ALT  13  /  AlkPhos  84  05-16      INTERPRETATION OF TELEMETRY: SR    ECG: SR, septal infarct, PRWP, LAD, LVH with repolarization abnormalities.   Prior ECG: Yes [ x ] No [  ]    RADIOLOGY & ADDITIONAL STUDIES:    X-ray:    < from: Xray Chest 1 View- PORTABLE-Urgent (Xray Chest 1 View- PORTABLE-Urgent .) (05.16.22 @ 12:36) >  ACC: [id number] EXAM:  XR CHEST PORTABLE URGENT 1V                          PROCEDURE DATE:  05/16/2022          INTERPRETATION:  AP chest on May 16, 2022 at 12:08 PM. Patient is short   of breath.    Heart enlargement again noted similar to August 19, 2019.    Mid lower lung field infiltrates likely congestive are also again seen   roughly similar.    IMPRESSION: Heart enlargement and mild CHF.    < end of copied text >    CT scan:   MRI:   US:

## 2022-05-16 NOTE — CONSULT NOTE ADULT - ASSESSMENT
A/P:  Patient is a 89 year old male with a PMHx of HFpEF (60-65% 2019), aortic stenosis, chronic lymphedema, HTN, CKD, and hypothyroidism who presented to the ED with complaints of dyspnea. Patient states he never followed up with a Caridologist after 2019, and he no longer has a PMD since he is unable to leave the house. Patient states that for the last few months on and off he has had episodes of dyspnea on exertion, and his legs have been chronically swollen for years. Patient states that this morning he was acutely short of breath at rest so he came to the ER to be evaluated. Patient denies any fevers, chills, CP, syncope, near syncope, abdominal pain, N/V/D, headache, or dizziness.   pBNP [id number]

## 2022-05-16 NOTE — H&P ADULT - HISTORY OF PRESENT ILLNESS
89 y/o M w/ PMH of HFpEF (last known EF 2019 was 65%), HTN, hypothyroid, chronic lymphedema, CKD IV came in c/o of sob that has been worsening the past few days.  Pt has home O2 to be used as needed and he did not improved despite using it.   He feels better sitting up.  He is unsure if he had had weight gain or increased leg swelling from baseline or leg pain.  Pt takes his meds as prescribed.  He was last admitted for similar symptoms in 2019.  Pt denies fevers/chills or sick contacts or recent travel.

## 2022-05-16 NOTE — ED ADULT TRIAGE NOTE - CHIEF COMPLAINT QUOTE
presents to ED with c/o SOB since last night, medicated with one combi treatment via EMS, states significant improvement

## 2022-05-16 NOTE — ED PROVIDER NOTE - PHYSICAL EXAMINATION
VITAL SIGNS: I have reviewed vital signs  CONSTITUTIONAL:  in no acute distress.  SKIN: Warm, dry, no rash.  HEAD: Normocephalic, atraumatic.  EYES: PERRL, conjunctiva and sclera clear.  ENT: pink & moist mucosa, no pharyngeal erythema  NECK: Supple, non tender. No cervical lymphadenopathy.  CARD: Regular rate and rhythm. No murmurs.   RESP: +WHEEZING  ABD:  soft, non-distended, non-tender.   MSK:  Good ROM in upper/lower extremities w/o pain.   NEURO: Alert, oriented. Grossly unremarkable. No focal deficits.   PSYCH: Cooperative, alert & oriented x3 VITAL SIGNS: I have reviewed vital signs  CONSTITUTIONAL:  in no acute distress.  SKIN: Warm, dry, no rash.  HEAD: Normocephalic, atraumatic.  EYES: PERRL, conjunctiva and sclera clear.  ENT: pink & moist mucosa, no pharyngeal erythema  NECK: Supple, non tender. No cervical lymphadenopathy.  CARD: Regular rate and rhythm. No murmurs.   RESP: +WHEEZING  ABD:  soft, non-distended, non-tender.   MSK:  SIGNIFICANT B/L LYMPHEDEMA.   NEURO: Alert, oriented. Grossly unremarkable. No focal deficits.   PSYCH: Cooperative, alert & oriented x3

## 2022-05-16 NOTE — ED ADULT NURSE NOTE - NSSUHOSCREENINGYN_ED_ALL_ED
NICU Progress Note  This is a extremely  male infant born 2020  5:05 AM at Gestational Age: 25w5d now at age: 2 Mos  Patient Active Problem List    Diagnosis Date Noted   • Left inguinal hernia 2020     Priority: Low   • Chronic lung disease of prematurity 2020     Priority: Low   •   infant of 25 completed weeks of gestation 2020     Priority: Low     Subjective: 25w5d male infant delivered following prolonged PPROM and footling breech.     R: Extubated 10/16 to NIPPV but had increasing alarms and work of breathing therefore was re-intubated on 10/18. Clinically deteriorated , HFOV, [place]. s/p DART started 10/30-.  Hydrocortisone taper -.  Vent status gradually improved and was changed to SIMV on 20.  2nd course DART  - . Also on budesonide and caffeine.  ET changed 12/3. Infant switched to intubated ROA 12/15 and O2 needs dropped.  Infant was extubated to prongs ROA  and is currently in about 100% O2  C: ECHO   very small PDA, and a PFO. No PPHN.  Nitric oxide weaned off  PM. ECHO on -no pulmonary hypertension, small PDA  F:  Continues tolerating feedings of 26 kcal Elecare due to h/o hematochezia on DBM.  PICC out . He is on Vit K and ADEKs for mild elevation of direct bilirubin.  H: Received PRBCs 10/22, , 11/15,  -  Hct 38 on ; 31.2 on   I:  Empiric vancomycin coverage  - . Blood cx negative  and . ET Gram stain on  positive for GN bacilli and GP cocci. The ETT culture from  grew few Klebsiella pneumoniae -  Sensitive to both gent and cefuro.  Repeat tracheal culture sent on  -  No organisms seen.   He received 10 days treatment. Off cefuroxime/gentamicin (on ) for Klebsiella pneumonia. Off fluconazole. ETT on 12/3 with rare strep mitis/oralis.   N:  On scheduled morphine, weaning slowly      Objective:  Vitals Last Value 24-Hour Range   Temperature 99 °F  (37.2 °C) (12/21/20 1400) Temp  Min: 98.2 °F (36.8 °C)  Max: 99.1 °F (37.3 °C)   Pulse 156 (12/21/20 1400) Pulse  Min: 156  Max: 191   Respiratory (!) 68 (12/21/20 1400) Resp  Min: 44  Max: 96   Non-Invasive Blood Pressure 77/48 (12/21/20 1100) BP  Min: 75/32  Max: 77/48   Arterial Blood Pressure (!) 20/20 (10/15/20 1830) No data recorded   Mean Arterial Pressure 57 (12/21/20 1100) MAP (mmHg)  Min: 46  Max: 57   Pulse Oximetry (!) 86 % (12/21/20 1400) SpO2  Min: 80 %  Max: 96 %     Vent Settings Last Value   Mode ROA (12/16/20 1123)  Noninvasive (12/21/20 1018)   Rate 25 (12/15/20 1115)   Peak Inspiratory Pressure 26 cmH20 (12/21/20 1018)   Inspiratory time 0.45 (12/21/20 1018)   Pressure Support 10 cm H20 (12/15/20 1115)   PEEP/CPAC/EPAP 7 cm H20 (12/21/20 1018)   FiO2 100 % (12/21/20 1400)     Last Apnea:    Reposition;Suction;Tactile stimulation, moderate;Oxygen (11/25/20 2344)    Physical Exam:  Birthweight:  1 lb 15 oz (880 g) with weight change of 151% since birth  Current weight:   Patient Vitals for the past 24 hrs:   Weight   12/20/20 2000 (!) 2210 g    with a  Weight change: 20 g overnight  Gen: Prone, in moderate respiratory distress on prongs ROA   Skin:  Pink, well perfused  AF: open and soft  Lungs: Equal breath sounds. Mild-moderate retractions. Fairly good air entry  CV:  RRR. No murmur, pulses normal and equal in all extremities  Abdomen:  Soft, non distended.   Neuro: tone ok    Fluids:    Source Rate     Feedings: [id number](+MCT)   40 ml q3hr    145ml/kg/day   Total Fluids       Urine    3.5 ml/kg/hr            Intake/Output       12/20 0700 - 12/21 0659 12/21 0700 - 12/22 0659    NG/GT (mL/kg) 325 (147.06) 82 (37.1)    Total Intake(mL/kg) 325 (147.06) 82 (37.1)    Urine (mL/kg/hr) 188 (3.54) 91 (5.71)    Stool 0 0    Total Output(mL/kg) 188 (85.07) 91 (41.18)    Net +137 -9          Stool Occurrence 4 x 1 x          Last Void:  1 (12/17/20 2300)    Last Stool:  1 (12/21/20 1100)  x4      Labs:     Recent Results (from the past 24 hour(s))   ISTAT CG8, CAPILLARY - POINT OF CARE    Collection Time: 12/21/20  4:51 AM   Result Value Ref Range    PH, CAPILLARY - POINT OF CARE 7.31 (L) 7.35 - 7.45 Units    PCO2, CAPILLARY - POINT OF CARE 61 (HH) 35 - 48 mm Hg    PO2, CAPILLARY - POINT OF CARE 30 (L) 34 - 45 mm Hg    BASE EXCESS / DEFICIT, CAPILLARY - POINT OF CARE 3 -2 - 3 mmol/L    HCO3, CAPILLARY - POINT OF CARE 31 (H) 22 - 28 mmol/L    TCO2 - POINT OF CARE 33 (H) 19 - 24 mmol/L    O2 SATURATION, CAPILLARY - POINT OF CARE 51 (L) >95 %    SODIUM - POINT OF CARE 136 135 - 145 mmol/L    POTASSIUM - POINT OF CARE 3.6 3.4 - 5.1 mmol/L    CALCIUM, IONIZED - POINT OF CARE 1.36 (H) 1.15 - 1.29 mmol/L    GLUCOSE - POINT OF CARE 78 70 - 99 mg/dL    HEMATOCRIT - POINT OF CARE 33.0 31.0 - 55.0 %    HEMOGLOBIN - POINT OF CARE 11.2 9.0 - 14.0 g/dL   Creatinine    Collection Time: 12/21/20  4:53 AM   Result Value Ref Range    Creatinine 0.26 0.16 - 0.59 mg/dL    Glomerular Filtration Rate     Bilirubin, Direct    Collection Time: 12/21/20  4:53 AM   Result Value Ref Range    Bilirubin, Direct 1.4 (H) 0.0 - 0.3 mg/dL   Reticulocyte Count Automated    Collection Time: 12/21/20  4:53 AM   Result Value Ref Range    Retic  (H) 10 - 120 K/mcL    Immature Retic Frac 27.1 %    Retic Count 6.2 (H) 0.3 - 2.5 %    Reticulocyte Hemoglobin Content 33.0 28.6 - 36.3 pg   CBC with Automated Differential (performable only)    Collection Time: 12/21/20  4:53 AM   Result Value Ref Range    WBC 9.8 5.0 - 19.5 K/mcL    RBC 3.31 2.70 - 4.90 mil/mcL    HGB 10.1 9.0 - 14.0 g/dL    HCT 29.6 (L) 31.0 - 55.0 %    MCV 89.4 77.0 - 115.0 fl    MCH 30.5 26.0 - 34.0 pg    MCHC 34.1 29.0 - 37.0 g/dL    RDW-CV 19.4 (H) 11.0 - 15.0 %     (H) 140 - 450 K/mcL    NRBC 0 <=0 /100 WBC    Neutrophil, Percent 52 %    Lymphocytes, Percent 30 %    Mono, Percent 16 %    Eosinophils, Percent 1 %    Basophils, Percent 0 %    Immature Granulocytes 1 %     Absolute Neutrophils 5.2 1.0 - 9.0 K/mcL    Absolute Lymphocytes 2.9 2.5 - 16.5 K/mcL    Absolute Monocytes 1.6 (H) 0.1 - 1.1 K/mcL    Absolute Eosinophils  0.1 0.0 - 0.9 K/mcL    Absolute Basophils 0.0 0.0 - 0.6 K/mcL    Absolute Immmature Granulocytes 0.1 0.0 - 0.2 K/mcL    RDW-SD 61.7 (H) 35.0 - 47.0 fL   Manual Differential    Collection Time: 20  4:53 AM   Result Value Ref Range    Polychromasia Few        Medications:  Current Facility-Administered Medications   Medication Dose Route Frequency Provider Last Rate Last Admin   • hydroCHLOROthiazide 5 MG/ML (compounded) suspension 4.1 mg  2 mg/kg Oral Q12H Josue Vuong MD   4.1 mg at 20 1407   • sodium chloride 4 mEq/mL oral solution 1.52 mEq  1.52 mEq Oral 4 times per day Josue Vuong MD   1.52 mEq at 20 1408   • potassium CHLORIDE estelita/ped oral liquid 1 mEq  1 mEq Oral 2 times per day Josue Vuong MD   1 mEq at 20 0830   • hydroCORTisone (CORTEF) (compounded)  oral suspension 4 mg  4 mg Oral 2 times per day Josue Vuong MD   4 mg at 20 0832    Followed by   • [START ON 2020] hydroCORTisone (CORTEF) (compounded)  oral suspension 3.6 mg  3.6 mg Oral 2 times per day Josue Vuong MD        Followed by   • [START ON 2020] hydroCORTisone (CORTEF) (compounded)  oral suspension 3 mg  3 mg Oral 2 times per day Josue Vuong MD        Followed by   • [START ON 2020] hydroCORTisone (CORTEF) (compounded)  oral suspension 2.6 mg  2.6 mg Oral 2 times per day Josue Vuong MD        Followed by   • [START ON 2020] hydroCORTisone (CORTEF) (compounded)  oral suspension 2 mg  2 mg Oral 2 times per day Josue Vuong MD        Followed by   • [START ON 2021] hydroCORTisone (CORTEF) (compounded)  oral suspension 1.5 mg  1.5 mg Oral 2 times per day Josue Vuong MD        Followed by   • [START ON  2021] hydroCORTisone (CORTEF) (compounded)  oral suspension 1 mg  1 mg Oral 2 times per day Josue Vuong MD        Followed by   • [START ON 2021] hydroCORTisone (CORTEF) (compounded)  oral suspension 1.5 mg  1.5 mg Oral Q24H Josue Vuong MD        Followed by   • [START ON 2021] hydroCORTisone (CORTEF) (compounded)  oral suspension 1.25 mg  1.25 mg Oral Q24H Josue Vuong MD        Followed by   • [START ON 2021] hydroCORTisone (CORTEF) (compounded)  oral suspension 1 mg  1 mg Oral Q24H Josue Vuong MD        Followed by   • [START ON 2021] hydroCORTisone (CORTEF) (compounded)  oral suspension 0.76 mg  0.76 mg Oral Q24H Josue Vuong MD        Followed by   • [START ON 2021] hydroCORTisone (CORTEF) (compounded)  oral suspension 0.5 mg  0.5 mg Oral Q24H Josue Vuong MD       • morphine oral solution 0.06 mg  0.06 mg Oral Q3H Josue Vuong MD   0.06 mg at 20 1407   • caffeine citrate ORAL (CAFCIT) 20 MG/ML  oral solution 16.8 mg  8 mg/kg Oral Q24H Josue Vuong MD   16.8 mg at 20 1704   • medium chain triglycerides (MCT OIL) oil 0.4 mL  0.4 mL Oral Q3H Josue Vuong MD   0.4 mL at 20 1407   • ferrous sulfate (elemental) (ELLY-IN-SOL)  oral solution 4.2 mg  2 mg/kg Oral Q12H Josue Vuong MD   4.2 mg at 20 0528   • pediatric multivitamin-zinc (AQUADEKS) oral liquid 0.5 mL  0.5 mL Per NG tube Q12H Josue Vuong MD   0.5 mL at 20 0523   • phytonadione (Vitamin K) 1 MG/ML oral suspension 1 mg  1 mg Oral Once per day on  Josue Vuong MD   1 mg at 20 0829   • levalbuterol (XOPENEX) 0.63 MG/3ML nebulizer solution 0.63 mg  0.63 mg Nebulization Q6H Resp Ana Avendano RRT   0.63 mg at 20 0845   • budesonide (PULMICORT) nebulizer suspension 0.25 mg  0.25 mg Nebulization Q12H  Josue Vuong MD   0.25 mg at 20 0200   • glycerin 99.5% 0.375 g  liquid  0.3 mL Rectal Daily PRN May Shetty MD   0.375 g at 20 1504   • petrolatum (white)-mineral oil ophthalmic ointment 1 application  1 application Both Eyes 4x Daily PRN TYSON Davidson   1 application at 20 1436     Impression:   male infant at 25 5/7 weeks, now corrected to 35w 5d -   RDS -- surf x1, with severe chronic lung disease.  Finished second course of DART. Good gases on prongs ROA but O2 needs high.  Bilateral atelectasis- improved, but has bad chronic infiltrates  Rule out sepsis -- prolonged ROM. Not found.  S/P  Klebsiella pneumoniae pneumonia.   Apnea of prematurity -- on Caffeine.   Hyperbilirubinemia - resolved -- off photo. Direct bilirubin remains mildly elevated  Footling breech, need hip US in future  Hypoglycemia, resolved  Hyponatremia-resolved  Hematochezia  - Resolved.    Normal head US at 1 week and 1 month  Hematology: Received PRBCs 10/22, 10/9, 11/15,   Essentially normal echocardiogram (small PDA or aorticopulmonary connection present)    Plan:  Resp: Restarted a long wean of hydrocortisone; adjust ROA settings prn.  Follow CBGs . On Caffeine.  Continue Albuterol q 6hr,  Pulmicort nebs. Increased hydrodiuril yesterday.    CV:  Continue to trend blood pressures.  Echocardiogram in 4 months unless needed sooner    FEN: continue Elecare  26 filipe/oz and increase feeding volume. Continue  MCT oil.   Follow tolerance.  Follow lytes.  Follow glucose.  Strict I+Os. Continue vitamins and Iron. On NaCl supplement and  KCl supps    Heme:  Follow CBCs Mon/Thurs.      ID: Monitor for infection    Neuro: Head ultrasound is indicated for this infant because infant is <30 weeks. Eye exams per routine. Head US prior to discharge.  Morphine weaned to 0.06mg yesterday,  Needs to  continued to wean    Current antibiotic status:  None     [facility]         I saw and examined Pancho Maldonado on 2020 at 2:12 PM and patient requires: NICU Critical Care: PPV, Enteral/Parenteral nutritional adjustments and Frequent lab monitoring            Yes - the patient is able to be screened

## 2022-05-16 NOTE — H&P ADULT - NSHPLABSRESULTS_GEN_ALL_CORE
13.9   8.80  )-----------( 225      ( 16 May 2022 12:21 )             44.7       05-16    137  |  104  |  45.0<H>  ----------------------------<  107<H>  6.8<HH>   |  17.0<L>  |  1.99<H>    Ca    9.1      16 May 2022 13:42    TPro  7.9  /  Alb  3.9  /  TBili  0.5  /  DBili  x   /  AST  31  /  ALT  13  /  AlkPhos  84  05-16      Serum Pro-Brain Natriuretic Peptide (05.16.22 @ 12:22)    Serum Pro-Brain Natriuretic Peptide: 61415 pg/mL      < from: TTE Echo Complete w/Doppler (07.24.19 @ 09:21) >  Summary:   1. Left ventricular ejection fraction, by visual estimation, is 60 to   65%.   2. Normal global left ventricular systolic function.   3. LV Ejection Fraction by Roman's Method with a biplane EF of 68 %.   4. Mildly increased LV wall thickness.   5. Normal left ventricular internal cavity size.   6. Trivial pericardial effusion.   7. Peak transmitral mean gradient equals 5.6 mmHg, calculated mitral   valve area by pressure half time equals 2.20 cm² consistent with mild   mitral stenosis.   8. Severe mitral annular calcification.   9. Thickening of the anterior and posterior mitral valve leaflets.  10. Endocardial visualization was enhanced with intravenous echo contrast.  11. Mitral valve mean gradient is 5.6 mmHg consistent with mild mitral   stenosis.  12. Peak transaortic gradient equals 22.0 mmHg, mean transaortic gradient   equals 12.5 mmHg, the calculated aortic valve area equals 1.07 cm² by the   continuity equation consistent with mild aortic stenosis.  13. The aortic valve mean gradient is 12.5 mmHg consistent with mild   aortic stenosis.  14. There is severe aortic root calcification.

## 2022-05-16 NOTE — ED PROVIDER NOTE - ATTENDING CONTRIBUTION TO CARE
89yo M with PMH CHF, HTN, hypothyroid BIBEMS in c/o RESP DISTRESS x 2 days a/w mild cough and dizziness. Increasing O2 requirement, pt using 2LNC while not usually using any supplemental O2. He denies any other associated symptoms specifically denying CP, fevers, urinary symptoms, abdominal pain. Pt is not vaccinated for covid or flu. Was treated w/ duonebs in route w/ improvement in SOB.    pt with significant b/l le edema on exam. workup c/w worsening heart failure. will diurese and admit

## 2022-05-16 NOTE — CONSULT NOTE ADULT - PROBLEM SELECTOR RECOMMENDATION 3
- BP elevated upon arrival.   - Continue home Coreg and imdur.   - Can increase imdur if needed for further BP control.   - Add hydralazine if needed as well.

## 2022-05-16 NOTE — H&P ADULT - NSHPPHYSICALEXAM_GEN_ALL_CORE
GENERAL: pt examined bedside, appears uncomfortable but in NAD  HEENT: NC/AT, moist oral mucosa, clear conjunctiva, sclera nonicteric  RESPIRATORY: increased wob, scattered rales b/l, no rhonchi or wheezing  CARDIOVASCULAR: RRR, normal S1 and S2  ABDOMEN: soft, obese abdomen, NT/ND, normoactive bowel sounds, no rebound/guarding  MSK: No joint deformities, edema, erythema  EXTREMITIES: No cynaosis, no clubbing, chronich b/l LE lymphedema, pulses are 1+ bilaterally  PSYCH: affect appropriate and cooperative  NEUROLOGY: A+O to person, place, and time, no focal neurologic deficits appreciated   SKIN: No rashes or no palpable lesions

## 2022-05-16 NOTE — ED PROVIDER NOTE - CLINICAL SUMMARY MEDICAL DECISION MAKING FREE TEXT BOX
ASSESSMENT:   TARA FAULKNER is a 89yo M who presented with RESP DISTRESS w/ increasing O2 requirement. EKG w/o acute change. Physical exam significant for wheezing.    Concerning for viral infection    PLAN: screen for infection, CXR, support oxygenation/breathing.     Medications:  albuterol/ipratropium for Nebulization    Studies:  Xray Chest 1 View- PORTABLE-Urgent  Troponin T, Serum  Complete Blood Count + Automated Diff  Flu With COVID-19 By KRISTINE  Comprehensive Metabolic Panel

## 2022-05-16 NOTE — H&P ADULT - NSHPREVIEWOFSYSTEMS_GEN_ALL_CORE
CONSTITUTIONAL: no fevers, chills, night sweats, weight changes  HEENT: no vision changes or diplopia, no tinnitus, no sore throat  RESPIRATORY: +dyspnea, +sob, no nocturnal cough, sputum or hemoptysis  CARDIOVASCULAR: no CP, palpitations, +lower extremity edema  GI: no dysphagia, nausea, abd pain, constipation, diarrhea, stool change or blood in stool  : no dysuria or hematuria, no flank pain, no incontinence or urinary retention  MSK: no joint pain or swelling  INTEGUMENTARY: no rashes or lesions  NEUROLOGICAL: no HA, confusion, syncope, numbness, weakness, tremors or ataxia  PSYCH: denies depression  ENDOCRINE: no polyuria, polydipsia, no temp intolerance, no tremors, no changes in skin, hair or nails  HEMATOLOGIC/LYMPHATIC: no lymph node enlargement, abnormal bruising or bleeding

## 2022-05-16 NOTE — ED PROVIDER NOTE - NS ED ROS FT
Review of Systems  CONSTITUTIONAL: afebrile w/no diaphoresis or weight changes +LIGHTHEADED  SKIN: warm, dry w/ no rash or bleeding  EYES: no changes to vision  ENT: no changes in hearing, no sore throat  RESPIRATORY: +COUGH, SOB  CARDIAC: no chest pain & no palpitations  GI: no abd pain, nausea, vomiting, diarrhea, constipation, or blood in stool/hailey blood  GENITO-URINARY: no discharge, dysuria or hematuria,   MUSCULOSKELETAL:  no joint pain, swelling or redness  NEUROLOGIC: no weakness, headache, anesthesia or paresthesias  PSYCH: no anxiety, non suicidal, non homicidal, without hallucinations or depression

## 2022-05-16 NOTE — CONSULT NOTE ADULT - PROBLEM SELECTOR RECOMMENDATION 9
- Last EF 60-65% 2019.  - Patient now with diffuse rales on exam, pulmonary vascular congestion on CXR, and severe LE edema.   - Start Lasix 60mg IV BID.   - Continue home Coreg and imdur.   - Obtain TTE.   - Monitor on telemetry.    - Strict i/o and daily weights.   - Keep K > 4, Mg > 2.   - Monitor renal function with ongoing diuresis.

## 2022-05-17 DIAGNOSIS — I50.21 ACUTE SYSTOLIC (CONGESTIVE) HEART FAILURE: ICD-10-CM

## 2022-05-17 LAB
ANION GAP SERPL CALC-SCNC: 14 MMOL/L — SIGNIFICANT CHANGE UP (ref 5–17)
BUN SERPL-MCNC: 47.6 MG/DL — HIGH (ref 8–20)
CALCIUM SERPL-MCNC: 8.9 MG/DL — SIGNIFICANT CHANGE UP (ref 8.6–10.2)
CHLORIDE SERPL-SCNC: 103 MMOL/L — SIGNIFICANT CHANGE UP (ref 98–107)
CO2 SERPL-SCNC: 23 MMOL/L — SIGNIFICANT CHANGE UP (ref 22–29)
CREAT SERPL-MCNC: 2.18 MG/DL — HIGH (ref 0.5–1.3)
EGFR: 25 ML/MIN/1.73M2 — LOW
GLUCOSE SERPL-MCNC: 154 MG/DL — HIGH (ref 70–99)
HCT VFR BLD CALC: 41.3 % — SIGNIFICANT CHANGE UP (ref 39–50)
HGB BLD-MCNC: 13 G/DL — SIGNIFICANT CHANGE UP (ref 13–17)
MAGNESIUM SERPL-MCNC: 2.3 MG/DL — SIGNIFICANT CHANGE UP (ref 1.6–2.6)
MCHC RBC-ENTMCNC: 31.5 GM/DL — LOW (ref 32–36)
MCHC RBC-ENTMCNC: 32.2 PG — SIGNIFICANT CHANGE UP (ref 27–34)
MCV RBC AUTO: 102.2 FL — HIGH (ref 80–100)
PHOSPHATE SERPL-MCNC: 3.8 MG/DL — SIGNIFICANT CHANGE UP (ref 2.4–4.7)
PLATELET # BLD AUTO: 251 K/UL — SIGNIFICANT CHANGE UP (ref 150–400)
POTASSIUM SERPL-MCNC: 4.8 MMOL/L — SIGNIFICANT CHANGE UP (ref 3.5–5.3)
POTASSIUM SERPL-SCNC: 4.8 MMOL/L — SIGNIFICANT CHANGE UP (ref 3.5–5.3)
RBC # BLD: 4.04 M/UL — LOW (ref 4.2–5.8)
RBC # FLD: 13.2 % — SIGNIFICANT CHANGE UP (ref 10.3–14.5)
SODIUM SERPL-SCNC: 140 MMOL/L — SIGNIFICANT CHANGE UP (ref 135–145)
WBC # BLD: 6.15 K/UL — SIGNIFICANT CHANGE UP (ref 3.8–10.5)
WBC # FLD AUTO: 6.15 K/UL — SIGNIFICANT CHANGE UP (ref 3.8–10.5)

## 2022-05-17 PROCEDURE — 99233 SBSQ HOSP IP/OBS HIGH 50: CPT

## 2022-05-17 PROCEDURE — 93970 EXTREMITY STUDY: CPT | Mod: 26

## 2022-05-17 RX ORDER — ASPIRIN/CALCIUM CARB/MAGNESIUM 324 MG
81 TABLET ORAL DAILY
Refills: 0 | Status: DISCONTINUED | OUTPATIENT
Start: 2022-05-17 | End: 2022-05-24

## 2022-05-17 RX ORDER — ATORVASTATIN CALCIUM 80 MG/1
40 TABLET, FILM COATED ORAL AT BEDTIME
Refills: 0 | Status: DISCONTINUED | OUTPATIENT
Start: 2022-05-17 | End: 2022-05-24

## 2022-05-17 RX ORDER — FUROSEMIDE 40 MG
40 TABLET ORAL DAILY
Refills: 0 | Status: DISCONTINUED | OUTPATIENT
Start: 2022-05-18 | End: 2022-05-18

## 2022-05-17 RX ADMIN — ISOSORBIDE MONONITRATE 30 MILLIGRAM(S): 60 TABLET, EXTENDED RELEASE ORAL at 11:47

## 2022-05-17 RX ADMIN — Medication 60 MILLIGRAM(S): at 05:39

## 2022-05-17 RX ADMIN — HEPARIN SODIUM 5000 UNIT(S): 5000 INJECTION INTRAVENOUS; SUBCUTANEOUS at 20:14

## 2022-05-17 RX ADMIN — HEPARIN SODIUM 5000 UNIT(S): 5000 INJECTION INTRAVENOUS; SUBCUTANEOUS at 11:47

## 2022-05-17 RX ADMIN — CARVEDILOL PHOSPHATE 6.25 MILLIGRAM(S): 80 CAPSULE, EXTENDED RELEASE ORAL at 17:30

## 2022-05-17 RX ADMIN — ATORVASTATIN CALCIUM 40 MILLIGRAM(S): 80 TABLET, FILM COATED ORAL at 21:54

## 2022-05-17 RX ADMIN — Medication 50 MICROGRAM(S): at 05:39

## 2022-05-17 RX ADMIN — CARVEDILOL PHOSPHATE 6.25 MILLIGRAM(S): 80 CAPSULE, EXTENDED RELEASE ORAL at 05:39

## 2022-05-17 NOTE — PROGRESS NOTE ADULT - PROBLEM SELECTOR PLAN 2
- Echo with severe AS and BERNADETTE 0.77.   - Diuresing as above.   - BP well controlled.   - Patient does not think he wants any invasive procedures at this time like TAVR.

## 2022-05-17 NOTE — PATIENT PROFILE ADULT - FALL HARM RISK - HARM RISK INTERVENTIONS

## 2022-05-17 NOTE — PROGRESS NOTE ADULT - ASSESSMENT
89 y/o M w/ PMH of HFpEF (last known EF 2019 was 65%), AS, HTN, hypothyroid, chronic lymphedema, CKD IV came in c/o of sob that has been worsening the past few days.  Requiring supplemental O2 on arrival to maintain O2 sats.  Notable increased WOB and volume overloaded on exam.  BNP 17K and CXR w/ mild b/l pleural effusion.  s/p 60mg IV lasix in ED w/ improved symptoms.  Pt will be admitted for acute on chronic HFpEF.      Acute hypoxic respiratory failure likely 2/2 acute decompensated HFpEF  - CXR personally reviewed and noted to have blunting of costophrenic angles and BNP 17K could be 2/2 CHF exacerbation   - Cardiology following  - Lasix IV  - Monitor I&Os, daily weights, fluid resitriction  - Telemetry  - Low suspicion for PE given pt improved w/ lasix and no rt heart strain on EKG however will order b/l LE doppler and TTE     - Unable to do CTA chest given Cr and pt unable to lay flat for VQ scan  - Daily weights, strict I/O's and fluid restrict to 1L   - Maintain K>4 and Mg>2  - Monitor on telemetry   - Cardiology consulted for further recs     Hyperkalemia   - Resolved  - monitor    MATILDE on CKD  - monitor while on diuresis    Essential HTN  - Resume home medications     Hypothyroid   - Resume synthroid     Advance Directives: DNR/DNI    VTE ppx: heparin sq    Discussed POC with son and patient.

## 2022-05-17 NOTE — PROGRESS NOTE ADULT - PROBLEM SELECTOR PLAN 1
- Echo with EF 40-45%, apex is hypokinetic, mild?mod MS, severe AS with BERNADETTE 0.77.  - Dyspnea improving, still with rales and significant lymphedema.   - Continue Lasix 40mg IV BID.   - Continue Coreg.   - Creatinine increasing at this time, unable to add ACEi/ARB/ARNI or MRA at this time. Will try to add soon.   - Patient also with hypokinetic apex, but at this time does not think he wants any invasive procedures like LHC. Dr. Sharpe to discuss with the patient as well.   - Monitor on telemetry.    - Strict i/o and daily weights.    - Keep K > 4, Mg > 2.    - Monitor renal function with ongoing diuresis. - Echo with EF 40-45%, apex is hypokinetic, mild?mod MS, severe AS with BERNADETTE 0.77.  - Dyspnea improving, still with rales and significant lymphedema.   - Decrease lasix to 40mg IV daily, can give additional doses if needed.   - Continue Coreg.   - Creatinine increasing at this time, unable to add ACEi/ARB/ARNI or MRA at this time. Will try to add soon.   - Patient also with hypokinetic apex, but at this time does not think he wants any invasive procedures like LHC. Dr. Sharpe to discuss with the patient as well.   - Start aspirin 81mg PO qday and lipitor 40mg PO QHS.   - Monitor on telemetry.    - Strict i/o and daily weights.    - Keep K > 4, Mg > 2.    - Monitor renal function with ongoing diuresis.

## 2022-05-17 NOTE — PROGRESS NOTE ADULT - SUBJECTIVE AND OBJECTIVE BOX
[facility] Division of Hospital Medicine  Mj Cisneros MD    Chief Complaint:  Patient is a 89y old  Male who presents with a chief complaint of sob (16 May 2022 16:14)      SUBJECTIVE / OVERNIGHT EVENTS:  Patient seen and examined at bedside. No acute events reported overnight. No new complaints.    Patient denies chest pain, SOB, abd pain, N/V, fever, chills, dysuria or any other complaints. All remainder ROS negative.     MEDICATIONS  (STANDING):  carvedilol 6.25 milliGRAM(s) Oral every 12 hours  furosemide   Injectable 60 milliGRAM(s) IV Push every 12 hours  heparin   Injectable 5000 Unit(s) SubCutaneous every 12 hours  isosorbide   mononitrate ER Tablet (IMDUR) 30 milliGRAM(s) Oral daily  levothyroxine 50 MICROGram(s) Oral daily    MEDICATIONS  (PRN):  acetaminophen     Tablet .. 650 milliGRAM(s) Oral every 6 hours PRN Temp greater or equal to 38C (100.4F), Mild Pain (1 - 3)  ALBUTerol    90 MICROgram(s) HFA Inhaler 1 Puff(s) Inhalation every 6 hours PRN Shortness of Breath and/or Wheezing  aluminum hydroxide/magnesium hydroxide/simethicone Suspension 30 milliLiter(s) Oral every 4 hours PRN Dyspepsia  melatonin 3 milliGRAM(s) Oral at bedtime PRN Insomnia        I&O's Summary    16 May 2022 07:01  -  17 May 2022 07:00  --------------------------------------------------------  IN: 0 mL / OUT: 250 mL / NET: -250 mL        PHYSICAL EXAM:  Vital Signs Last 24 Hrs  T(C): 36.5 (17 May 2022 10:44), Max: 36.9 (17 May 2022 00:40)  T(F): 97.7 (17 May 2022 10:44), Max: 98.4 (17 May 2022 00:40)  HR: 60 (17 May 2022 10:44) (60 - 75)  BP: 127/61 (17 May 2022 10:44) (112/60 - 172/78)  BP(mean): --  RR: 18 (17 May 2022 10:44) (18 - 23)  SpO2: 100% (17 May 2022 10:44) (96% - 100%)        CONSTITUTIONAL: NAD  HEENT: NC/AT, PERRL, no JVD  RESPIRATORY: Bibasilar crackles  CARDIOVASCULAR: RRR, S1/S2+, no m/g/r  ABDOMEN: Nontender to palpation, normoactive bowel sounds, no rebound/guarding  MUSCULOSKELETAL: LE edema  PSYCH: Calm, affect appropriate.  NEUROLOGY: Awake, alert, no focal neurological deficits.   SKIN: No rashes; no palpable lesions  VASC: Distal pulses palpable    LABS:                        13.0   6.15  )-----------( 251      ( 17 May 2022 06:29 )             41.3     05-17    140  |  103  |  47.6<H>  ----------------------------<  154<H>  4.8   |  23.0  |  2.18<H>    Ca    8.9      17 May 2022 06:29  Phos  3.8     05-17  Mg     2.3     05-17    TPro  7.9  /  Alb  3.9  /  TBili  0.5  /  DBili  x   /  AST  31  /  ALT  13  /  AlkPhos  84  05-16              CAPILLARY BLOOD GLUCOSE            RADIOLOGY & ADDITIONAL TESTS:  Results Reviewed:   Imaging Personally Reviewed:  Electrocardiogram Personally Reviewed:

## 2022-05-17 NOTE — GOALS OF CARE CONVERSATION - ADVANCED CARE PLANNING - CONVERSATION DETAILS
Spoke with patient and son at bedside. Pt AOx4 and normally makes his own medical decisions. LILIANE discussed, Pt is DNR/DNI with son (Hieu) at bedside in agreement to honor his father's wishes. Pt also expressed that he would not want a trial of feeding tube or dialysis but otherwise there are no limitations on medical treatment at this time. All questions answered. LILIANE completed with RN as witness. DNR/DNI.

## 2022-05-17 NOTE — PROGRESS NOTE ADULT - SUBJECTIVE AND OBJECTIVE BOX
[facility]                                                         CARDIOLOGY AT [address], [place] [address]                                                         Telephone: [phone]. Fax:[phone]                                                                             PROGRESS NOTE    Reason for follow up: Acute Decompensated HFrEF, Severe AS  Update: Patient is currently resting comfortably, states his breathing feels better then yesterday but he is still dyspneic. Patient was made DNR/DNI by the primary team. Patient's echocardiogram shows EF 40-45%, apex is hypokinetic, mild?mod MS, severe AS with BERNADETTE 0.77. Patient states that at this time he does not think he would want any invasive procedures due to his advanced age.       Review of symptoms:   Cardiac:  No chest pain. + dyspnea. No palpitations.  Respiratory: no cough. + dyspnea  Gastrointestinal: No diarrhea. No abdominal pain. No bleeding.   Neuro: No focal neuro complaints.    Vitals:  T(C): 36.5 (05-17-22 @ 10:44), Max: 36.9 (05-17-22 @ 00:40)  HR: 60 (05-17-22 @ 10:44) (60 - 75)  BP: 127/61 (05-17-22 @ 10:44) (112/60 - 172/78)  RR: 18 (05-17-22 @ 10:44) (18 - 23)  SpO2: 100% (05-17-22 @ 10:44) (96% - 100%)  Wt(kg): --  I&O's Summary    16 May 2022 07:01  -  17 May 2022 07:00  --------------------------------------------------------  IN: 0 mL / OUT: 250 mL / NET: -250 mL      Weight (kg): 98.8 (05-17 @ 01:20)    PHYSICAL EXAM:  Constitutional: Comfortable . No acute distress.   HEENT: Atraumatic and normocephalic , neck is supple . + JVD. No carotid bruit.  CNS: A&Ox3. No focal deficits.   Respiratory: Bibasilar rales  Cardiovascular: RRR normal s1 s2.  No rubs or gallop. III/VI systolic ejection murmur RUSB  Gastrointestinal: Soft, non-tender. +Bowel sounds.   Extremities: 2+ Peripheral Pulses, No clubbing, cyanosis, 3+++ b/l LE edema  Psychiatric: Calm . no agitation.   Skin: Warm and dry, no ulcers on extremities       CURRENT CARDIAC MEDICATIONS:  carvedilol 6.25 milliGRAM(s) Oral every 12 hours  furosemide   Injectable 60 milliGRAM(s) IV Push every 12 hours  isosorbide   mononitrate ER Tablet (IMDUR) 30 milliGRAM(s) Oral daily      CURRENT OTHER MEDICATIONS:  ALBUTerol    90 MICROgram(s) HFA Inhaler 1 Puff(s) Inhalation every 6 hours PRN Shortness of Breath and/or Wheezing  acetaminophen     Tablet .. 650 milliGRAM(s) Oral every 6 hours PRN Temp greater or equal to 38C (100.4F), Mild Pain (1 - 3)  melatonin 3 milliGRAM(s) Oral at bedtime PRN Insomnia  aluminum hydroxide/magnesium hydroxide/simethicone Suspension 30 milliLiter(s) Oral every 4 hours PRN Dyspepsia  levothyroxine 50 MICROGram(s) Oral daily  heparin   Injectable 5000 Unit(s) SubCutaneous every 12 hours      LABS:	 	  ( 16 May 2022 12:22 )  Troponin T  X    ,  CPK  X    , CKMB  X    , BNP 37684<H>                              13.0   6.15  )-----------( 251      ( 17 May 2022 06:29 )             41.3     05-17    140  |  103  |  47.6<H>  ----------------------------<  154<H>  4.8   |  23.0  |  2.18<H>    Ca    8.9      17 May 2022 06:29  Phos  3.8     05-17  Mg     2.3     05-17    TPro  7.9  /  Alb  3.9  /  TBili  0.5  /  DBili  x   /  AST  31  /  ALT  13  /  AlkPhos  84  05-16      Lipid Profile:   HgA1c:   TSH:     TELEMETRY: SR  ECG:    DIAGNOSTIC TESTING:  [ ] Echocardiogram:   < from: TTE Echo Complete w/ Contrast w/ Doppler (05.17.22 @ 08:42) >  PHYSICIAN INTERPRETATION:  Left Ventricle: Endocardial visualization was enhanced with intravenous   echo contrast. The left ventricular internal cavity size is normal. Left   ventricular wall thickness is normal.  Global LV systolic function was mildly to moderately decreased. Left   ventricular ejection fraction, by visual estimation, is 40 to 45%.      LV Wall Scoring:  The apex is hypokinetic.    Right Ventricle: Normal right ventricular size and function.  Left Atrium: Moderately enlarged left atrium.  Right Atrium: Moderately enlarged right atrium.  Pericardium: There is no evidence of pericardial effusion.  Mitral Valve: Thickening of the anterior and posterior mitral valve   leaflets. There is moderate mitral annular calcification. Peak   transmitral mean gradient equals 3.8 mmHg, calculated mitral valve area   by pressure half time equals 1.15 cm² consistent with moderate mitral   stenosis. Mitral valve mean gradientis 3.8 mmHg consistent with mild   mitral stenosis. Trace mitral valve regurgitation is seen.  Tricuspid Valve: Structurally normal tricuspid valve, with normal leaflet   excursion. Trivial tricuspid regurgitation is visualized. Adequate TR   velocity was not obtained to accurately assess RVSP.  Aortic Valve: Peak transaortic gradient equals 40.3 mmHg, mean   transaortic gradient equals 24.8 mmHg, the calculated aortic valve area   equals 0.77 cm² by the continuity equation consistent with severeaortic   stenosis. No evidence of aortic valve regurgitation is seen.  Pulmonic Valve: Structurally normal pulmonic valve, with normal leaflet   excursion. Trace pulmonic valve regurgitation.  Aorta: The aortic root is normal in size and structure.  Pulmonary Artery: The main pulmonary artery is normal in size.  Venous: The inferior vena cava is normal. The inferior vena cava was   normal sized, with respiratory size variation greater than 50%. The   inferior vena cava and the hepatic vein show anormal flow pattern.      Summary:   1. Left ventricular ejection fraction, by visual estimation, is 40 to   45%.   2. Mildly to moderately decreased global left ventricular systolic   function.   3. [place] is abnormal as described above.   4. Moderately enlarged left atrium.   5. Moderately enlarged right atrium.   6. Moderate mitral annular calcification.   7. Peak transmitral mean gradient equals 3.8 mmHg, calculated mitral   valve area by pressure half time equals 1.15 cm² consistent with moderate   mitral stenosis.   8. Thickening of the anterior and posterior mitral valve leaflets.   9. Trace mitral valve regurgitation.  10. Endocardial visualization was enhanced with intravenous echo contrast.  11. Mitral valve mean gradient is 3.8 mmHg consistent with mild mitral   stenosis.  12. Peak transaortic gradient equals 40.3 mmHg, mean transaortic gradient   equals 24.8 mmHg, the calculated aortic valve area equals 0.77 cm² by the   continuity equation consistent with severe aortic stenosis.    MD Tamela Electronically signed on 5/17/2022 at 11:46:51 AM    < end of copied text >    [ ]  Catheterization:  [ ] Stress Test:    OTHER:

## 2022-05-17 NOTE — PROGRESS NOTE ADULT - ASSESSMENT
A/P:  Patient is a 89 year old male with a PMHx of HFpEF (60-65% 2019), aortic stenosis, chronic lymphedema, HTN, CKD, and hypothyroidism who presented to the ED with complaints of dyspnea. Patient states he never followed up with a Caridologist after 2019, and he no longer has a PMD since he is unable to leave the house. Patient states that for the last few months on and off he has had episodes of dyspnea on exertion, and his legs have been chronically swollen for years. Patient states that this morning he was acutely short of breath at rest so he came to the ER to be evaluated. Patient denies any fevers, chills, CP, syncope, near syncope, abdominal pain, N/V/D, headache, or dizziness.   pBNP 16398    5/17 - Patient is currently resting comfortably, states his breathing feels better then yesterday but he is still dyspneic. Patient was made DNR/DNI by the primary team. Patient's echocardiogram shows EF 40-45%, apex is hypokinetic, mild?mod MS, severe AS with BERNADETTE 0.77. Patient states that at this time he does not think he would want any invasive procedures due to his advanced age.

## 2022-05-18 LAB
ALBUMIN SERPL ELPH-MCNC: 3.6 G/DL — SIGNIFICANT CHANGE UP (ref 3.3–5.2)
ALP SERPL-CCNC: 73 U/L — SIGNIFICANT CHANGE UP (ref 40–120)
ALT FLD-CCNC: 12 U/L — SIGNIFICANT CHANGE UP
ANION GAP SERPL CALC-SCNC: 11 MMOL/L — SIGNIFICANT CHANGE UP (ref 5–17)
AST SERPL-CCNC: 17 U/L — SIGNIFICANT CHANGE UP
BILIRUB SERPL-MCNC: 0.4 MG/DL — SIGNIFICANT CHANGE UP (ref 0.4–2)
BUN SERPL-MCNC: 58.6 MG/DL — HIGH (ref 8–20)
CALCIUM SERPL-MCNC: 9 MG/DL — SIGNIFICANT CHANGE UP (ref 8.6–10.2)
CHLORIDE SERPL-SCNC: 101 MMOL/L — SIGNIFICANT CHANGE UP (ref 98–107)
CO2 SERPL-SCNC: 27 MMOL/L — SIGNIFICANT CHANGE UP (ref 22–29)
CREAT SERPL-MCNC: 2.35 MG/DL — HIGH (ref 0.5–1.3)
EGFR: 23 ML/MIN/1.73M2 — LOW
GLUCOSE SERPL-MCNC: 102 MG/DL — HIGH (ref 70–99)
HCT VFR BLD CALC: 40.5 % — SIGNIFICANT CHANGE UP (ref 39–50)
HGB BLD-MCNC: 12.4 G/DL — LOW (ref 13–17)
MAGNESIUM SERPL-MCNC: 2.4 MG/DL — SIGNIFICANT CHANGE UP (ref 1.6–2.6)
MCHC RBC-ENTMCNC: 30.6 GM/DL — LOW (ref 32–36)
MCHC RBC-ENTMCNC: 31.9 PG — SIGNIFICANT CHANGE UP (ref 27–34)
MCV RBC AUTO: 104.1 FL — HIGH (ref 80–100)
PHOSPHATE SERPL-MCNC: 3.8 MG/DL — SIGNIFICANT CHANGE UP (ref 2.4–4.7)
PLATELET # BLD AUTO: 230 K/UL — SIGNIFICANT CHANGE UP (ref 150–400)
POTASSIUM SERPL-MCNC: 5.8 MMOL/L — HIGH (ref 3.5–5.3)
POTASSIUM SERPL-SCNC: 5.8 MMOL/L — HIGH (ref 3.5–5.3)
PROT SERPL-MCNC: 6.6 G/DL — SIGNIFICANT CHANGE UP (ref 6.6–8.7)
RBC # BLD: 3.89 M/UL — LOW (ref 4.2–5.8)
RBC # FLD: 13.3 % — SIGNIFICANT CHANGE UP (ref 10.3–14.5)
SODIUM SERPL-SCNC: 139 MMOL/L — SIGNIFICANT CHANGE UP (ref 135–145)
WBC # BLD: 7.43 K/UL — SIGNIFICANT CHANGE UP (ref 3.8–10.5)
WBC # FLD AUTO: 7.43 K/UL — SIGNIFICANT CHANGE UP (ref 3.8–10.5)

## 2022-05-18 PROCEDURE — 99233 SBSQ HOSP IP/OBS HIGH 50: CPT

## 2022-05-18 PROCEDURE — 99232 SBSQ HOSP IP/OBS MODERATE 35: CPT

## 2022-05-18 RX ORDER — CARVEDILOL PHOSPHATE 80 MG/1
3.12 CAPSULE, EXTENDED RELEASE ORAL EVERY 12 HOURS
Refills: 0 | Status: DISCONTINUED | OUTPATIENT
Start: 2022-05-18 | End: 2022-05-24

## 2022-05-18 RX ORDER — SODIUM ZIRCONIUM CYCLOSILICATE 10 G/10G
10 POWDER, FOR SUSPENSION ORAL DAILY
Refills: 0 | Status: COMPLETED | OUTPATIENT
Start: 2022-05-18 | End: 2022-05-20

## 2022-05-18 RX ADMIN — CARVEDILOL PHOSPHATE 6.25 MILLIGRAM(S): 80 CAPSULE, EXTENDED RELEASE ORAL at 05:17

## 2022-05-18 RX ADMIN — Medication 81 MILLIGRAM(S): at 12:18

## 2022-05-18 RX ADMIN — HEPARIN SODIUM 5000 UNIT(S): 5000 INJECTION INTRAVENOUS; SUBCUTANEOUS at 08:18

## 2022-05-18 RX ADMIN — ATORVASTATIN CALCIUM 40 MILLIGRAM(S): 80 TABLET, FILM COATED ORAL at 21:04

## 2022-05-18 RX ADMIN — CARVEDILOL PHOSPHATE 3.12 MILLIGRAM(S): 80 CAPSULE, EXTENDED RELEASE ORAL at 16:28

## 2022-05-18 RX ADMIN — Medication 50 MICROGRAM(S): at 05:17

## 2022-05-18 RX ADMIN — Medication 40 MILLIGRAM(S): at 05:20

## 2022-05-18 RX ADMIN — SODIUM ZIRCONIUM CYCLOSILICATE 10 GRAM(S): 10 POWDER, FOR SUSPENSION ORAL at 12:18

## 2022-05-18 RX ADMIN — ISOSORBIDE MONONITRATE 30 MILLIGRAM(S): 60 TABLET, EXTENDED RELEASE ORAL at 12:18

## 2022-05-18 RX ADMIN — HEPARIN SODIUM 5000 UNIT(S): 5000 INJECTION INTRAVENOUS; SUBCUTANEOUS at 21:04

## 2022-05-18 NOTE — PROGRESS NOTE ADULT - PROBLEM SELECTOR PLAN 1
- Echo with EF 40-45%, apex is hypokinetic, mod MS, severe AS with BERNADETTE 0.77.  - Dyspnea resolving.  - BUN/Cr increasing, hold lasix at this time.   - Decrease Coreg to 3.125 for sinus bradycardia.   - Creatinine increasing at this time, unable to add ACEi/ARB/ARNI or MRA at this time. Will try to add soon.   - Patient also with hypokinetic apex, but at this time does not think he wants any invasive procedures like LHC.  - Continue aspirin 81mg PO qday and lipitor 40mg PO QHS.   - Monitor on telemetry.    - Strict i/o and daily weights.    - Keep K > 4, Mg > 2.    - Monitor renal function with ongoing diuresis.

## 2022-05-18 NOTE — PROGRESS NOTE ADULT - ASSESSMENT
89 y/o M w/ PMH of HFpEF (last known EF 2019 was 65%), AS, HTN, hypothyroid, chronic lymphedema, CKD IV came in c/o of sob that has been worsening the past few days.  Requiring supplemental O2 on arrival to maintain O2 sats.  Notable increased WOB and volume overloaded on exam.  BNP 17K and CXR w/ mild b/l pleural effusion.  s/p 60mg IV lasix in ED w/ improved symptoms.  Pt will be admitted for acute on chronic HFpEF.      Acute hypoxic respiratory failure likely 2/2 acute decompensated HFpEF  - CXR personally reviewed and noted to have blunting of costophrenic angles and BNP 17K could be 2/2 CHF exacerbation   - Cardiology following  - Monitor I&Os, daily weights, fluid resitriction  - Telemetry   - Unable to do CTA chest given Cr and pt unable to lay flat for VQ scan  - TTE reviewed  - Hold Lasix    Hyperkalemia   - Resolved  - monitor    MATILDE on CKD (worsening), Hyperkalemia  - K: 5.8, Lokelma ordered  - monitor BMP, correct electrolytes as needed    Essential HTN  - Resumed home medications     Hypothyroid   - Resumed synthroid     Advance Directives: DNR/DNI    VTE ppx: heparin sq    Discussed POC with patient

## 2022-05-18 NOTE — PROGRESS NOTE ADULT - SUBJECTIVE AND OBJECTIVE BOX
[facility]                                                         CARDIOLOGY AT [facility]                                                                  [address], [place] [address]                                                         Telephone: [phone]. Fax:[phone]                                                                             PROGRESS NOTE    Reason for follow up: Acute Decompensated HFrEF, Severe AS  Update: Patient states that he feels very lightheaded today, and just doesn't feel well. Patient's BUN/Cr continue to increase at this time, will hold diuresis.       Review of symptoms:   Cardiac:  No chest pain. No dyspnea. No palpitations.  Respiratory: no cough. No dyspnea  Gastrointestinal: No diarrhea. No abdominal pain. No bleeding.   Neuro: No focal neuro complaints.    Vitals:  T(C): 36.7 (05-18-22 @ 04:59), Max: 36.7 (05-18-22 @ 04:59)  HR: 83 (05-18-22 @ 04:59) (61 - 83)  BP: 110/52 (05-18-22 @ 04:59) (107/61 - 123/64)  RR: 18 (05-18-22 @ 04:59) (18 - 18)  SpO2: 100% (05-18-22 @ 04:59) (98% - 100%)  Wt(kg): --  I&O's Summary    17 May 2022 07:01  -  18 May 2022 07:00  --------------------------------------------------------  IN: 0 mL / OUT: 1295 mL / NET: -1295 mL      Weight (kg): 98.8 (05-17 @ 01:20)    PHYSICAL EXAM:  Constitutional: Comfortable . No acute distress.   HEENT: Atraumatic and normocephalic , neck is supple . + JVD. No carotid bruit.  CNS: A&Ox3. No focal deficits.   Respiratory: Decreased BS at bases  Cardiovascular: RRR normal s1 s2.  No rubs or gallop. III/VI systolic ejection murmur RUSB  Gastrointestinal: Soft, non-tender. +Bowel sounds.   Extremities: 2+ Peripheral Pulses, No clubbing, cyanosis, 3+++ b/l LE edema  Psychiatric: Calm . no agitation.   Skin: Warm and dry, no ulcers on extremities     CURRENT CARDIAC MEDICATIONS:  carvedilol 3.125 milliGRAM(s) Oral every 12 hours  isosorbide   mononitrate ER Tablet (IMDUR) 30 milliGRAM(s) Oral daily      CURRENT OTHER MEDICATIONS:  ALBUTerol    90 MICROgram(s) HFA Inhaler 1 Puff(s) Inhalation every 6 hours PRN Shortness of Breath and/or Wheezing  acetaminophen     Tablet .. 650 milliGRAM(s) Oral every 6 hours PRN Temp greater or equal to 38C (100.4F), Mild Pain (1 - 3)  melatonin 3 milliGRAM(s) Oral at bedtime PRN Insomnia  aluminum hydroxide/magnesium hydroxide/simethicone Suspension 30 milliLiter(s) Oral every 4 hours PRN Dyspepsia  atorvastatin 40 milliGRAM(s) Oral at bedtime  levothyroxine 50 MICROGram(s) Oral daily  aspirin enteric coated 81 milliGRAM(s) Oral daily  heparin   Injectable 5000 Unit(s) SubCutaneous every 12 hours      LABS:	 	  ( 16 May 2022 12:22 )  Troponin T  X    ,  CPK  X    , CKMB  X    , BNP 06811<H>                              12.4   7.43  )-----------( 230      ( 18 May 2022 05:57 )             40.5     05-18    139  |  101  |  58.6<H>  ----------------------------<  102<H>  5.8<H>   |  27.0  |  2.35<H>    Ca    9.0      18 May 2022 05:57  Phos  3.8     05-18  Mg     2.4     05-18    TPro  6.6  /  Alb  3.6  /  TBili  0.4  /  DBili  x   /  AST  17  /  ALT  12  /  AlkPhos  73  05-18      Lipid Profile:   HgA1c:   TSH:     TELEMETRY: SR, SB  ECG:    DIAGNOSTIC TESTING:  [ ] Echocardiogram:   < from: TTE Echo Complete w/ Contrast w/ Doppler (05.17.22 @ 08:42) >  PHYSICIAN INTERPRETATION:  Left Ventricle: Endocardial visualization was enhanced with intravenous   echo contrast. The left ventricular internal cavity size is normal. Left   ventricular wall thickness is normal.  Global LV systolic function was mildly to moderately decreased. Left   ventricular ejection fraction, by visual estimation, is 40 to 45%.      LV Wall Scoring:  The apex is hypokinetic.    Right Ventricle: Normal right ventricular size and function.  Left Atrium: Moderately enlarged left atrium.  Right Atrium: Moderately enlarged right atrium.  Pericardium: There is no evidence of pericardial effusion.  Mitral Valve: Thickening of the anterior and posterior mitral valve   leaflets. There is moderate mitral annular calcification. Peak   transmitral mean gradient equals 3.8 mmHg, calculated mitral valve area   by pressure half time equals 1.15 cm² consistent with moderate mitral   stenosis. Mitral valve mean gradientis 3.8 mmHg consistent with mild   mitral stenosis. Trace mitral valve regurgitation is seen.  Tricuspid Valve: Structurally normal tricuspid valve, with normal leaflet   excursion. Trivial tricuspid regurgitation is visualized. Adequate TR   velocity was not obtained to accurately assess RVSP.  Aortic Valve: Peak transaortic gradient equals 40.3 mmHg, mean   transaortic gradient equals 24.8 mmHg, the calculated aortic valve area   equals 0.77 cm² by the continuity equation consistent with severeaortic   stenosis. No evidence of aortic valve regurgitation is seen.  Pulmonic Valve: Structurally normal pulmonic valve, with normal leaflet   excursion. Trace pulmonic valve regurgitation.  Aorta: The aortic root is normal in size and structure.  Pulmonary Artery: The main pulmonary artery is normal in size.  Venous: The inferior vena cava is normal. The inferior vena cava was   normal sized, with respiratory size variation greater than 50%. The   inferior vena cava and the hepatic vein show anormal flow pattern.      Summary:   1. Left ventricular ejection fraction, by visual estimation, is 40 to   45%.   2. Mildly to moderately decreased global left ventricular systolic   function.   3. [place] is abnormal as described above.   4. Moderately enlarged left atrium.   5. Moderately enlarged right atrium.   6. Moderate mitral annular calcification.   7. Peak transmitral mean gradient equals 3.8 mmHg, calculated mitral   valve area by pressure half time equals 1.15 cm² consistent with moderate   mitral stenosis.   8. Thickening of the anterior and posterior mitral valve leaflets.   9. Trace mitral valve regurgitation.  10. Endocardial visualization was enhanced with intravenous echo contrast.  11. Mitral valve mean gradient is 3.8 mmHg consistent with mild mitral   stenosis.  12. Peak transaortic gradient equals 40.3 mmHg, mean transaortic gradient   equals 24.8 mmHg, the calculated aortic valve area equals 0.77 cm² by the   continuity equation consistent with severe aortic stenosis.    MD Tamela Electronically signed on 5/17/2022 at 11:46:51 AM    < end of copied text >    [ ]  Catheterization:  [ ] Stress Test:    OTHER:

## 2022-05-18 NOTE — PROGRESS NOTE ADULT - PROBLEM SELECTOR PLAN 2
- Echo with severe AS and BERNADETTE 0.77.   - Diuresis on hold.  - BP well controlled.   - Patient does not think he wants any invasive procedures at this time like TAVR.

## 2022-05-18 NOTE — PROGRESS NOTE ADULT - SUBJECTIVE AND OBJECTIVE BOX
[facility] Division of Hospital Medicine  Mj Cisneros MD    Chief Complaint:  Patient is a 89y old  Male who presents with a chief complaint of sob (17 May 2022 14:12)      SUBJECTIVE / OVERNIGHT EVENTS:  Patient seen and examined at bedside. No acute events reported overnight. No new complaints.    Patient denies chest pain, SOB, abd pain, N/V, fever, chills, dysuria or any other complaints. All remainder ROS negative.     MEDICATIONS  (STANDING):  aspirin enteric coated 81 milliGRAM(s) Oral daily  atorvastatin 40 milliGRAM(s) Oral at bedtime  carvedilol 3.125 milliGRAM(s) Oral every 12 hours  heparin   Injectable 5000 Unit(s) SubCutaneous every 12 hours  isosorbide   mononitrate ER Tablet (IMDUR) 30 milliGRAM(s) Oral daily  levothyroxine 50 MICROGram(s) Oral daily  sodium zirconium cyclosilicate 10 Gram(s) Oral daily    MEDICATIONS  (PRN):  acetaminophen     Tablet .. 650 milliGRAM(s) Oral every 6 hours PRN Temp greater or equal to 38C (100.4F), Mild Pain (1 - 3)  ALBUTerol    90 MICROgram(s) HFA Inhaler 1 Puff(s) Inhalation every 6 hours PRN Shortness of Breath and/or Wheezing  aluminum hydroxide/magnesium hydroxide/simethicone Suspension 30 milliLiter(s) Oral every 4 hours PRN Dyspepsia  melatonin 3 milliGRAM(s) Oral at bedtime PRN Insomnia        I&O's Summary    17 May 2022 07:01  -  18 May 2022 07:00  --------------------------------------------------------  IN: 0 mL / OUT: 1295 mL / NET: -1295 mL        PHYSICAL EXAM:  Vital Signs Last 24 Hrs  T(C): 36.7 (18 May 2022 04:59), Max: 36.7 (18 May 2022 04:59)  T(F): 98 (18 May 2022 04:59), Max: 98 (18 May 2022 04:59)  HR: 83 (18 May 2022 04:59) (61 - 83)  BP: 110/52 (18 May 2022 04:59) (107/61 - 123/64)  BP(mean): --  RR: 18 (18 May 2022 04:59) (18 - 18)  SpO2: 100% (18 May 2022 04:59) (98% - 100%)        CONSTITUTIONAL: NAD  HEENT: NC/AT, PERRL, no JVD  RESPIRATORY: Bibasilar crackles (improved)  CARDIOVASCULAR: RRR, S1/S2+, no m/g/r  ABDOMEN: Nontender to palpation, normoactive bowel sounds, no rebound/guarding  MUSCULOSKELETAL: LE swelling+  PSYCH: Calm, affect appropriate.  NEUROLOGY: Awake, alert, no focal neurological deficits.   SKIN: No rashes; no palpable lesions  VASC: Distal pulses palpable    LABS:                        12.4   7.43  )-----------( 230      ( 18 May 2022 05:57 )             40.5     05-18    139  |  101  |  58.6<H>  ----------------------------<  102<H>  5.8<H>   |  27.0  |  2.35<H>    Ca    9.0      18 May 2022 05:57  Phos  3.8     05-18  Mg     2.4     05-18    TPro  6.6  /  Alb  3.6  /  TBili  0.4  /  DBili  x   /  AST  17  /  ALT  12  /  AlkPhos  73  05-18              CAPILLARY BLOOD GLUCOSE            RADIOLOGY & ADDITIONAL TESTS:  Results Reviewed:   Imaging Personally Reviewed:  Electrocardiogram Personally Reviewed:

## 2022-05-18 NOTE — PROGRESS NOTE ADULT - ASSESSMENT
A/P:  Patient is a 89 year old male with a PMHx of HFpEF (60-65% 2019), aortic stenosis, chronic lymphedema, HTN, CKD, and hypothyroidism who presented to the ED with complaints of dyspnea. Patient states he never followed up with a Caridologist after 2019, and he no longer has a PMD since he is unable to leave the house. Patient states that for the last few months on and off he has had episodes of dyspnea on exertion, and his legs have been chronically swollen for years. Patient states that this morning he was acutely short of breath at rest so he came to the ER to be evaluated. Patient denies any fevers, chills, CP, syncope, near syncope, abdominal pain, N/V/D, headache, or dizziness.   pBNP 20548    5/17 - Patient is currently resting comfortably, states his breathing feels better then yesterday but he is still dyspneic. Patient was made DNR/DNI by the primary team. Patient's echocardiogram shows EF 40-45%, apex is hypokinetic, mild?mod MS, severe AS with BERNADETTE 0.77. Patient states that at this time he does not think he would want any invasive procedures due to his advanced age.   5/18: Patient states that he feels very lightheaded today, and just doesn't feel well. Patient's BUN/Cr continue to increase at this time, will hold diuresis.

## 2022-05-19 LAB
ALBUMIN SERPL ELPH-MCNC: 3.4 G/DL — SIGNIFICANT CHANGE UP (ref 3.3–5.2)
ALP SERPL-CCNC: 78 U/L — SIGNIFICANT CHANGE UP (ref 40–120)
ALT FLD-CCNC: 14 U/L — SIGNIFICANT CHANGE UP
ANION GAP SERPL CALC-SCNC: 14 MMOL/L — SIGNIFICANT CHANGE UP (ref 5–17)
AST SERPL-CCNC: 21 U/L — SIGNIFICANT CHANGE UP
BILIRUB SERPL-MCNC: 0.3 MG/DL — LOW (ref 0.4–2)
BUN SERPL-MCNC: 55.4 MG/DL — HIGH (ref 8–20)
CALCIUM SERPL-MCNC: 8.7 MG/DL — SIGNIFICANT CHANGE UP (ref 8.6–10.2)
CHLORIDE SERPL-SCNC: 103 MMOL/L — SIGNIFICANT CHANGE UP (ref 98–107)
CO2 SERPL-SCNC: 22 MMOL/L — SIGNIFICANT CHANGE UP (ref 22–29)
CREAT SERPL-MCNC: 1.98 MG/DL — HIGH (ref 0.5–1.3)
EGFR: 28 ML/MIN/1.73M2 — LOW
GLUCOSE SERPL-MCNC: 89 MG/DL — SIGNIFICANT CHANGE UP (ref 70–99)
HCT VFR BLD CALC: 44.1 % — SIGNIFICANT CHANGE UP (ref 39–50)
HGB BLD-MCNC: 12.9 G/DL — LOW (ref 13–17)
MCHC RBC-ENTMCNC: 29.3 GM/DL — LOW (ref 32–36)
MCHC RBC-ENTMCNC: 31.8 PG — SIGNIFICANT CHANGE UP (ref 27–34)
MCV RBC AUTO: 108.6 FL — HIGH (ref 80–100)
PLATELET # BLD AUTO: 161 K/UL — SIGNIFICANT CHANGE UP (ref 150–400)
POTASSIUM SERPL-MCNC: 5 MMOL/L — SIGNIFICANT CHANGE UP (ref 3.5–5.3)
POTASSIUM SERPL-SCNC: 5 MMOL/L — SIGNIFICANT CHANGE UP (ref 3.5–5.3)
PROT SERPL-MCNC: 6.7 G/DL — SIGNIFICANT CHANGE UP (ref 6.6–8.7)
RBC # BLD: 4.06 M/UL — LOW (ref 4.2–5.8)
RBC # FLD: 13.4 % — SIGNIFICANT CHANGE UP (ref 10.3–14.5)
SODIUM SERPL-SCNC: 139 MMOL/L — SIGNIFICANT CHANGE UP (ref 135–145)
WBC # BLD: 5.16 K/UL — SIGNIFICANT CHANGE UP (ref 3.8–10.5)
WBC # FLD AUTO: 5.16 K/UL — SIGNIFICANT CHANGE UP (ref 3.8–10.5)

## 2022-05-19 PROCEDURE — 99233 SBSQ HOSP IP/OBS HIGH 50: CPT

## 2022-05-19 PROCEDURE — 99232 SBSQ HOSP IP/OBS MODERATE 35: CPT

## 2022-05-19 RX ADMIN — ISOSORBIDE MONONITRATE 30 MILLIGRAM(S): 60 TABLET, EXTENDED RELEASE ORAL at 12:35

## 2022-05-19 RX ADMIN — HEPARIN SODIUM 5000 UNIT(S): 5000 INJECTION INTRAVENOUS; SUBCUTANEOUS at 21:22

## 2022-05-19 RX ADMIN — Medication 50 MICROGRAM(S): at 05:28

## 2022-05-19 RX ADMIN — HEPARIN SODIUM 5000 UNIT(S): 5000 INJECTION INTRAVENOUS; SUBCUTANEOUS at 11:29

## 2022-05-19 RX ADMIN — ATORVASTATIN CALCIUM 40 MILLIGRAM(S): 80 TABLET, FILM COATED ORAL at 21:22

## 2022-05-19 RX ADMIN — CARVEDILOL PHOSPHATE 3.12 MILLIGRAM(S): 80 CAPSULE, EXTENDED RELEASE ORAL at 18:20

## 2022-05-19 RX ADMIN — Medication 81 MILLIGRAM(S): at 12:35

## 2022-05-19 RX ADMIN — CARVEDILOL PHOSPHATE 3.12 MILLIGRAM(S): 80 CAPSULE, EXTENDED RELEASE ORAL at 05:31

## 2022-05-19 RX ADMIN — SODIUM ZIRCONIUM CYCLOSILICATE 10 GRAM(S): 10 POWDER, FOR SUSPENSION ORAL at 12:46

## 2022-05-19 NOTE — PROGRESS NOTE ADULT - ASSESSMENT
Patient is a 89 year old male with a PMHx of HFpEF (60-65% 2019), aortic stenosis, chronic lymphedema, HTN, CKD, and hypothyroidism who presented to the ED with complaints of dyspnea. Patient states he never followed up with a Caridologist after 2019, and he no longer has a PMD since he is unable to leave the house. Patient states that for the last few months on and off he has had episodes of dyspnea on exertion, and his legs have been chronically swollen for years. Patient states that this morning he was acutely short of breath at rest so he came to the ER to be evaluated. Patient denies any fevers, chills, CP, syncope, near syncope, abdominal pain, N/V/D, headache, or dizziness.   pBNP 72864    5/17 - Patient is currently resting comfortably, states his breathing feels better then yesterday but he is still dyspneic. Patient was made DNR/DNI by the primary team. Patient's echocardiogram shows EF 40-45%, apex is hypokinetic, mild?mod MS, severe AS with BERNADETTE 0.77. Patient states that at this time he does not think he would want any invasive procedures due to his advanced age.   5/18: Patient states that he feels very lightheaded today, and just doesn't feel well. Patient's BUN/Cr continue to increase at this time, will hold diuresis.   5/19: Sitting up in bed, eating and states he feels a little better today

## 2022-05-19 NOTE — PROGRESS NOTE ADULT - PROBLEM SELECTOR PLAN 3
-TTE EF 40-45%  -Continue Coreg  -Dyspnea better  -Resume Lasix PO  -Monitor I/O and daily weights  -Continue ASA and lipitor -TTE EF 40-45%  -Continue Coreg  -Dyspnea better  -Continue to hold lasix, monitor renal function  -Monitor I/O and daily weights  -Continue ASA and lipitor

## 2022-05-19 NOTE — PROGRESS NOTE ADULT - SUBJECTIVE AND OBJECTIVE BOX
[facility]                                                         CARDIOLOGY AT [facility]                                                                  [address], [place] [address]                                                         Telephone: [phone]. Fax:[phone]                                                                             PROGRESS NOTE    Reason for follow up: Acute decompensated HFrEF, Severe AS  Update: Pt. sitting up and eating, feels better today, Diuresis on hold due to rise in Bun/Creat but coming down now      Review of symptoms:   Cardiac:  No chest pain. No dyspnea. No palpitations.  Respiratory: no cough. No dyspnea  Gastrointestinal: No diarrhea. No abdominal pain. No bleeding.   Neuro: No focal neuro complaints.      Vitals:  T(C): 36.4 (22 @ 09:25), Max: 36.9 (22 @ 16:30)  HR: 67 (22 @ 09:25) (61 - 88)  BP: 126/62 (22 @ 09:25) (116/56 - 166/68)  RR: 18 (22 @ 09:25) (18 - 18)  SpO2: 100% (22 @ 09:25) (98% - 100%)  Wt(kg): --  I&O's Summary    18 May 2022 07:01  -  19 May 2022 07:00  --------------------------------------------------------  IN: 0 mL / OUT: 1000 mL / NET: -1000 mL      Weight (kg): 98.8 ( @ 01:20)      PHYSICAL EXAM:  Appearance: Comfortable. No acute distress  HEENT:  Atraumatic. Normocephalic.  Normal oral mucosa  Neurologic: A & O x 3, no gross focal deficits.  Cardiovascular: RRR S1 S2, 3/6 systolic murmur, no rubs/gallops. No JVD  Respiratory: Decreased at bases  Gastrointestinal:  Soft, Non-tender, + BS  Lower Extremities: Severe edema b/l, non palp pulses  Psychiatry: Patient is calm. No agitation.   Skin: warm and dry.      CURRENT MEDICATIONS:  carvedilol 3.125 milliGRAM(s) Oral every 12 hours  isosorbide   mononitrate ER Tablet (IMDUR) 30 milliGRAM(s) Oral daily  atorvastatin  levothyroxine  aspirin enteric coated  heparin   Injectable        LABS:	 	  CARDIAC MARKERS ( 16 May 2022 12:22 )  x     / x     / x     / x     / x      p-BNP 16 May 2022 12:22: 68936 pg/mL                          12.9   5.16  )-----------( 161      ( 19 May 2022 07:08 )             44.1     05-19    139  |  103  |  55.4<H>  ----------------------------<  89  5.0   |  22.0  |  1.98<H>    Ca    8.7      19 May 2022 07:08  Phos  3.8     05-18  Mg     2.4     18    TPro  6.7  /  Alb  3.4  /  TBili  0.3<L>  /  DBili  x   /  AST  21  /  ALT  14  /  AlkPhos  78  05-19    proBNP: Serum Pro-Brain Natriuretic Peptide: 74363 pg/mL (16 @ 12:22)    Lipid Profile:   HgA1c:   TSH:       TELEMETRY: SR, SB  ECG:  	      DIAGNOSTIC TESTIN22  [ ] Echocardiogram:   Summary:   1. Left ventricular ejection fraction, by visual estimation, is 40 to   45%.   2. Mildly to moderately decreased global left ventricular systolic   function.   3. [place] is abnormal as described above.   4. Moderately enlarged left atrium.   5. Moderately enlarged right atrium.   6. Moderate mitral annular calcification.   7. Peak transmitral mean gradient equals 3.8 mmHg, calculated mitral   valve area by pressure half time equals 1.15 cm² consistent with moderate   mitral stenosis.   8. Thickening of the anterior and posterior mitral valve leaflets.   9. Trace mitral valve regurgitation.  10. Endocardial visualization was enhanced with intravenous echo contrast.  11. Mitral valve mean gradient is 3.8 mmHg consistent with mild mitral   stenosis.  12. Peak transaortic gradient equals 40.3 mmHg, mean transaortic gradient   equals 24.8 mmHg, the calculated aortic valve area equals 0.77 cm² by the   continuity equation consistent with severe aortic stenosis.    MD Tamela Electronically signed on 2022 at 11:46:51 AM      [ ]  Catheterization:  [ ] Stress Test:    OTHER: 	                                                                [facility]                                                         CARDIOLOGY AT [facility]                                                                  [address], [place] [address]                                                         Telephone: [phone]. Fax:[phone]                                                                             PROGRESS NOTE    Reason for follow up: Acute decompensated HFrEF, Severe AS  Update: Pt. sitting up and eating, feels better today, Diuresis on hold due to rise in Bun/Creat but coming down now      Review of symptoms:   Cardiac:  No chest pain. No dyspnea. No palpitations.  Respiratory: no cough. No dyspnea  Gastrointestinal: No diarrhea. No abdominal pain. No bleeding.   Neuro: No focal neuro complaints.      Vitals:  T(C): 36.4 (22 @ 09:25), Max: 36.9 (22 @ 16:30)  HR: 67 (22 @ 09:25) (61 - 88)  BP: 126/62 (22 @ 09:25) (116/56 - 166/68)  RR: 18 (22 @ 09:25) (18 - 18)  SpO2: 100% (22 @ 09:25) (98% - 100%)  Wt(kg): --  I&O's Summary    18 May 2022 07:01  -  19 May 2022 07:00  --------------------------------------------------------  IN: 0 mL / OUT: 1000 mL / NET: -1000 mL      Weight (kg): 98.8 ( @ 01:20)      PHYSICAL EXAM:  Appearance: Comfortable. No acute distress  HEENT:  Atraumatic. Normocephalic.  Normal oral mucosa  Neurologic: A & O x 3, no gross focal deficits.  Cardiovascular: RRR S1 S2, 3/6 systolic murmur, no rubs/gallops. No JVD  Respiratory: Decreased at bases  Gastrointestinal:  Soft, Non-tender, + BS  Lower Extremities: Severe edema b/l, non palp pulses  Psychiatry: Patient is calm. No agitation.   Skin: warm and dry.      CURRENT MEDICATIONS:  carvedilol 3.125 milliGRAM(s) Oral every 12 hours  isosorbide   mononitrate ER Tablet (IMDUR) 30 milliGRAM(s) Oral daily  atorvastatin  levothyroxine  aspirin enteric coated  heparin   Injectable        LABS:	 	  CARDIAC MARKERS ( 16 May 2022 12:22 )  x     / x     / x     / x     / x      p-BNP 16 May 2022 12:22: 79107 pg/mL                          12.9   5.16  )-----------( 161      ( 19 May 2022 07:08 )             44.1     05-19    139  |  103  |  55.4<H>  ----------------------------<  89  5.0   |  22.0  |  1.98<H>    Ca    8.7      19 May 2022 07:08  Phos  3.8     05-18  Mg     2.4     18    TPro  6.7  /  Alb  3.4  /  TBili  0.3<L>  /  DBili  x   /  AST  21  /  ALT  14  /  AlkPhos  78  05-19    proBNP: Serum Pro-Brain Natriuretic Peptide: 78402 pg/mL (16 @ 12:22)    Lipid Profile:   HgA1c:   TSH:       TELEMETRY: SR, SB  ECG:  	      DIAGNOSTIC TESTIN22  [ ] Echocardiogram:   Summary:   1. Left ventricular ejection fraction, by visual estimation, is 40 to   45%.   2. Mildly to moderately decreased global left ventricular systolic   function.   3. [place] is abnormal as described above.   4. Moderately enlarged left atrium.   5. Moderately enlarged right atrium.   6. Moderate mitral annular calcification.   7. Peak transmitral mean gradient equals 3.8 mmHg, calculated mitral   valve area by pressure half time equals 1.15 cm² consistent with moderate   mitral stenosis.   8. Thickening of the anterior and posterior mitral valve leaflets.   9. Trace mitral valve regurgitation.  10. Endocardial visualization was enhanced with intravenous echo contrast.  11. Mitral valve mean gradient is 3.8 mmHg consistent with mild mitral   stenosis.  12. Peak transaortic gradient equals 40.3 mmHg, mean transaortic gradient   equals 24.8 mmHg, the calculated aortic valve area equals 0.77 cm² by the   continuity equation consistent with severe aortic stenosis.    MD Tamela Electronically signed on 2022 at 11:46:51 AM      [ ]  Catheterization:  [ ] Stress Test:    OTHER:

## 2022-05-19 NOTE — PHYSICAL THERAPY INITIAL EVALUATION ADULT - ADDITIONAL COMMENTS
per patient he lives alone but has an aide that comes in several hours a day. pt uses a rollator walker at baseline and has Luke LE foot drop for ~3years.

## 2022-05-19 NOTE — PROGRESS NOTE ADULT - PROBLEM SELECTOR PLAN 2
-By TTE, severe AS ( AoV Peak P.3 mmHg AoV Mean P.8 mmHg  AoV Area, Vmn: 0.72 cm²)  -Resume Lasix at lower dose 20 mg daily, creatinine better  -BP contolled  -Patient refusing invasive procedures, conservative management -By TTE, severe AS ( AoV Peak P.3 mmHg AoV Mean P.8 mmHg  AoV Area, Vmn: 0.72 cm²)  -Continue to hold lasix, creatinine better  -BP contolled  -Patient refusing invasive procedures, conservative management

## 2022-05-19 NOTE — PROGRESS NOTE ADULT - ASSESSMENT
89 y/o M w/ PMH of HFpEF (last known EF 2019 was 65%), AS, HTN, hypothyroid, chronic lymphedema, CKD IV came in c/o of sob that has been worsening the past few days.  Requiring supplemental O2 on arrival to maintain O2 sats.  Notable increased WOB and volume overloaded on exam.  BNP 17K and CXR w/ mild b/l pleural effusion.  s/p 60mg IV lasix in ED w/ improved symptoms.  Pt will be admitted for acute on chronic HFpEF.      Acute hypoxic respiratory failure likely 2/2 acute decompensated HFpEF  - CXR personally reviewed and noted to have blunting of costophrenic angles and BNP 17K could be 2/2 CHF exacerbation   - Cardiology following  - Monitor I&Os, daily weights, fluid resitriction  - Telemetry   - Unable to do CTA chest given Cr and pt unable to lay flat for VQ scan  - TTE reviewed  - Hold Lasix    Hyperkalemia   - Resolved  - monitor    MATILDE on CKD, Hyperkalemia  - Hyperkalemia resolved  - MATILDE improving  - monitor BMP, correct electrolytes as needed    Essential HTN  - Resumed home medications     Hypothyroid   - Resumed synthroid     Advance Directives: DNR/DNI    VTE ppx: heparin sq    Discussed POC with patient    Possible discharge in 24-48 hrs w/ home care

## 2022-05-19 NOTE — PROGRESS NOTE ADULT - SUBJECTIVE AND OBJECTIVE BOX
[facility] Division of Hospital Medicine  Mj Cisneros MD    Chief Complaint:  Patient is a 89y old  Male who presents with a chief complaint of sob (19 May 2022 11:19)      SUBJECTIVE / OVERNIGHT EVENTS:  Patient seen and examined at bedside. No acute events reported overnight. No new complaints.    Patient denies chest pain, abd pain, N/V, fever, chills, dysuria or any other complaints. All remainder ROS negative.     MEDICATIONS  (STANDING):  aspirin enteric coated 81 milliGRAM(s) Oral daily  atorvastatin 40 milliGRAM(s) Oral at bedtime  carvedilol 3.125 milliGRAM(s) Oral every 12 hours  heparin   Injectable 5000 Unit(s) SubCutaneous every 12 hours  isosorbide   mononitrate ER Tablet (IMDUR) 30 milliGRAM(s) Oral daily  levothyroxine 50 MICROGram(s) Oral daily  sodium zirconium cyclosilicate 10 Gram(s) Oral daily    MEDICATIONS  (PRN):  acetaminophen     Tablet .. 650 milliGRAM(s) Oral every 6 hours PRN Temp greater or equal to 38C (100.4F), Mild Pain (1 - 3)  ALBUTerol    90 MICROgram(s) HFA Inhaler 1 Puff(s) Inhalation every 6 hours PRN Shortness of Breath and/or Wheezing  aluminum hydroxide/magnesium hydroxide/simethicone Suspension 30 milliLiter(s) Oral every 4 hours PRN Dyspepsia  melatonin 3 milliGRAM(s) Oral at bedtime PRN Insomnia        I&O's Summary    18 May 2022 07:01  -  19 May 2022 07:00  --------------------------------------------------------  IN: 0 mL / OUT: 1000 mL / NET: -1000 mL        PHYSICAL EXAM:  Vital Signs Last 24 Hrs  T(C): 36.4 (19 May 2022 09:25), Max: 36.9 (18 May 2022 16:30)  T(F): 97.5 (19 May 2022 09:25), Max: 98.4 (18 May 2022 16:30)  HR: 67 (19 May 2022 09:25) (61 - 88)  BP: 126/62 (19 May 2022 09:25) (116/56 - 166/68)  BP(mean): 56 (18 May 2022 12:03) (56 - 56)  RR: 18 (19 May 2022 09:25) (18 - 18)  SpO2: 100% (19 May 2022 09:25) (98% - 100%)        CONSTITUTIONAL: NAD  HEENT: NC/AT, PERRL, no JVD  RESPIRATORY: Bibasilar crackles (improved)  CARDIOVASCULAR: RRR, S1/S2+, no m/g/r  ABDOMEN: Nontender to palpation, normoactive bowel sounds, no rebound/guarding  MUSCULOSKELETAL: LE edema (improved)  PSYCH: Calm, affect appropriate.  NEUROLOGY: Awake, alert, no focal neurological deficits.   SKIN: No rashes; no palpable lesions  VASC: Distal pulses palpable    LABS:                        12.9   5.16  )-----------( 161      ( 19 May 2022 07:08 )             44.1     05-19    139  |  103  |  55.4<H>  ----------------------------<  89  5.0   |  22.0  |  1.98<H>    Ca    8.7      19 May 2022 07:08  Phos  3.8     05-18  Mg     2.4     05-18    TPro  6.7  /  Alb  3.4  /  TBili  0.3<L>  /  DBili  x   /  AST  21  /  ALT  14  /  AlkPhos  78  05-19              CAPILLARY BLOOD GLUCOSE            RADIOLOGY & ADDITIONAL TESTS:  Results Reviewed:   Imaging Personally Reviewed:  Electrocardiogram Personally Reviewed:

## 2022-05-20 DIAGNOSIS — I50.9 HEART FAILURE, UNSPECIFIED: ICD-10-CM

## 2022-05-20 LAB
ANION GAP SERPL CALC-SCNC: 10 MMOL/L — SIGNIFICANT CHANGE UP (ref 5–17)
BUN SERPL-MCNC: 49.6 MG/DL — HIGH (ref 8–20)
CALCIUM SERPL-MCNC: 8.6 MG/DL — SIGNIFICANT CHANGE UP (ref 8.6–10.2)
CHLORIDE SERPL-SCNC: 102 MMOL/L — SIGNIFICANT CHANGE UP (ref 98–107)
CO2 SERPL-SCNC: 28 MMOL/L — SIGNIFICANT CHANGE UP (ref 22–29)
CREAT SERPL-MCNC: 1.89 MG/DL — HIGH (ref 0.5–1.3)
EGFR: 30 ML/MIN/1.73M2 — LOW
GLUCOSE SERPL-MCNC: 125 MG/DL — HIGH (ref 70–99)
POTASSIUM SERPL-MCNC: 4.5 MMOL/L — SIGNIFICANT CHANGE UP (ref 3.5–5.3)
POTASSIUM SERPL-SCNC: 4.5 MMOL/L — SIGNIFICANT CHANGE UP (ref 3.5–5.3)
SODIUM SERPL-SCNC: 140 MMOL/L — SIGNIFICANT CHANGE UP (ref 135–145)

## 2022-05-20 PROCEDURE — 99232 SBSQ HOSP IP/OBS MODERATE 35: CPT

## 2022-05-20 PROCEDURE — 99233 SBSQ HOSP IP/OBS HIGH 50: CPT

## 2022-05-20 RX ORDER — FUROSEMIDE 40 MG
40 TABLET ORAL DAILY
Refills: 0 | Status: DISCONTINUED | OUTPATIENT
Start: 2022-05-20 | End: 2022-05-24

## 2022-05-20 RX ADMIN — ATORVASTATIN CALCIUM 40 MILLIGRAM(S): 80 TABLET, FILM COATED ORAL at 21:27

## 2022-05-20 RX ADMIN — HEPARIN SODIUM 5000 UNIT(S): 5000 INJECTION INTRAVENOUS; SUBCUTANEOUS at 21:28

## 2022-05-20 RX ADMIN — ISOSORBIDE MONONITRATE 30 MILLIGRAM(S): 60 TABLET, EXTENDED RELEASE ORAL at 11:22

## 2022-05-20 RX ADMIN — CARVEDILOL PHOSPHATE 3.12 MILLIGRAM(S): 80 CAPSULE, EXTENDED RELEASE ORAL at 05:53

## 2022-05-20 RX ADMIN — SODIUM ZIRCONIUM CYCLOSILICATE 10 GRAM(S): 10 POWDER, FOR SUSPENSION ORAL at 11:22

## 2022-05-20 RX ADMIN — CARVEDILOL PHOSPHATE 3.12 MILLIGRAM(S): 80 CAPSULE, EXTENDED RELEASE ORAL at 17:12

## 2022-05-20 RX ADMIN — HEPARIN SODIUM 5000 UNIT(S): 5000 INJECTION INTRAVENOUS; SUBCUTANEOUS at 07:49

## 2022-05-20 RX ADMIN — Medication 81 MILLIGRAM(S): at 11:22

## 2022-05-20 RX ADMIN — Medication 40 MILLIGRAM(S): at 17:12

## 2022-05-20 RX ADMIN — Medication 50 MICROGRAM(S): at 05:52

## 2022-05-20 NOTE — PROGRESS NOTE ADULT - SUBJECTIVE AND OBJECTIVE BOX
[facility]                                                         CARDIOLOGY AT [facility]                                                                  [address], [place] [address]                                                         Telephone: [phone]. Fax:[phone]                                                                             PROGRESS NOTE    Reason for follow up: HFrEF, AS   Update: Sitting up, states not feeling well, same as yesterday.       Review of symptoms:   Cardiac:  No chest pain. No dyspnea. No palpitations.  Respiratory: no cough. No dyspnea  Gastrointestinal: No diarrhea. No abdominal pain. No bleeding.   Neuro: No focal neuro complaints.      Vitals:  T(C): 36.7 (05-20-22 @ 05:30), Max: 36.8 (05-19-22 @ 17:38)  HR: 72 (05-20-22 @ 05:30) (61 - 83)  BP: 134/72 (05-20-22 @ 05:30) (121/62 - 156/62)  RR: 18 (05-20-22 @ 05:30) (18 - 18)  SpO2: 96% (05-20-22 @ 05:30) (96% - 100%)      I&O's Summary    19 May 2022 07:01  -  20 May 2022 07:00  --------------------------------------------------------  IN: 600 mL / OUT: 770 mL / NET: -170 mL      Weight (kg): 98.8 (05-17 @ 01:20)      PHYSICAL EXAM:  Appearance: Comfortable. No acute distress  HEENT:  Atraumatic. Normocephalic.  Normal oral mucosa  Neurologic: A & O x 3, no gross focal deficits.  Cardiovascular: RRR S1 S2, III/VI systolic murmur, no rubs/gallops. No JVD  Respiratory: Lungs clear to auscultation, unlabored   Gastrointestinal:  Soft, Non-tender, + BS  Lower Extremities: + edema  Psychiatry: Patient is calm. No agitation.   Skin: warm and dry.        CURRENT CARDIAC MEDICATIONS:  carvedilol 3.125 milliGRAM(s) Oral every 12 hours  isosorbide   mononitrate ER Tablet (IMDUR) 30 milliGRAM(s) Oral daily        CURRENT OTHER MEDICATIONS:  ALBUTerol    90 MICROgram(s) HFA Inhaler 1 Puff(s) Inhalation every 6 hours PRN Shortness of Breath and/or Wheezing  acetaminophen     Tablet .. 650 milliGRAM(s) Oral every 6 hours PRN Temp greater or equal to 38C (100.4F), Mild Pain (1 - 3)  melatonin 3 milliGRAM(s) Oral at bedtime PRN Insomnia  aluminum hydroxide/magnesium hydroxide/simethicone Suspension 30 milliLiter(s) Oral every 4 hours PRN Dyspepsia  atorvastatin 40 milliGRAM(s) Oral at bedtime  levothyroxine 50 MICROGram(s) Oral daily  aspirin enteric coated 81 milliGRAM(s) Oral daily  heparin   Injectable 5000 Unit(s) SubCutaneous every 12 hours        LABS:	 	  ( 16 May 2022 12:22 )  Troponin T  X    ,  CPK  X    , CKMB  X    , BNP 78988<H>                        12.9   5.16  )-----------( 161      ( 19 May 2022 07:08 )             44.1     05-19    139  |  103  |  55.4<H>  ----------------------------<  89  5.0   |  22.0  |  1.98<H>    Ca    8.7      19 May 2022 07:08    TPro  6.7  /  Alb  3.4  /  TBili  0.3<L>  /  DBili  x   /  AST  21  /  ALT  14  /  AlkPhos  78  05-19      TELEMETRY: SR

## 2022-05-20 NOTE — PROGRESS NOTE ADULT - PROBLEM SELECTOR PLAN 3
.  -By TTE, severe AS ( AoV Peak P.3 mmHg AoV Mean P.8 mmHg  AoV Area, Vmn: 0.72 cm²)  -Patient refusing invasive procedures, conservative management

## 2022-05-20 NOTE — PROGRESS NOTE ADULT - NS ATTEND AMEND GEN_ALL_CORE FT
severe aortic stenosis. congestive heart failure . diastolic . conservatibve mangement  ct IV lasix as per patient decrease urine output. as per nurse, significant urine output. no output docuemnts in the chart.  will get bladder scan.    reduce lasix to daily. PRn lasix extra dose tomorrow if needed.   Incentive spirometer. Grandaughter at bedside. discussed with her.  aspirin. beta-blocker and low dose ARb once Cr stable .   DVt PPX
restart lasix 40 mg daily.  Out of Bed to Chair . ambualte. incentive spiromter.   conservative management for aortic stenosis.
Significant urine output. appears dehydreated now.  leg edema improved.    incentive spirotmer.  Out of Bed to Chair.  hold lasix.     aortic stenosis congestive heart failure : conservative meangmenet and medical therapy.
severe aortic stenosis diastolic heart failure.  off diuretics due to over diuresis.  reevalaute for lasix PO tomorrow. if BUN and cr stable.   dicharge plan tomorrow. PT OT consult.

## 2022-05-20 NOTE — PROGRESS NOTE ADULT - ASSESSMENT
89 y/o M w/ PMH of HFpEF (last known EF 2019 was 65%), AS, HTN, hypothyroid, chronic lymphedema, CKD IV came in c/o of sob that has been worsening the past few days.  Requiring supplemental O2 on arrival to maintain O2 sats.  Notable increased WOB and volume overloaded on exam.  BNP 17K and CXR w/ mild b/l pleural effusion.  s/p 60mg IV lasix in ED w/ improved symptoms.  Pt will be admitted for acute on chronic HFpEF.      Acute hypoxic respiratory failure likely 2/2 acute decompensated HFpEF  - CXR personally reviewed and noted to have blunting of costophrenic angles and BNP 17K could be 2/2 CHF exacerbation   - Cardiology following  - Monitor I&Os, daily weights, fluid restriction  - Telemetry   - Unable to do CTA chest given Cr and pt unable to lay flat for VQ scan  - TTE reviewed  - Resume Lasix 40mg qd per Cardio    Hyperkalemia   - Resolved  - monitor    MATILDE on CKD, Hyperkalemia  - Hyperkalemia resolved  - MATILDE improving  - monitor BMP, correct electrolytes as needed    Essential HTN  - Resumed home medications     Hypothyroid   - Resumed synthroid     Advance Directives: DNR/DNI    VTE ppx: heparin sq    Discussed POC with patient    PT recommending HUMBERTO   89 y/o M w/ PMH of HFpEF (last known EF 2019 was 65%), AS, HTN, hypothyroid, chronic lymphedema, CKD IV came in c/o of sob that has been worsening the past few days.  Requiring supplemental O2 on arrival to maintain O2 sats.  Notable increased WOB and volume overloaded on exam.  BNP 17K and CXR w/ mild b/l pleural effusion.  s/p 60mg IV lasix in ED w/ improved symptoms.  Pt will be admitted for acute on chronic HFpEF.      Acute hypoxic respiratory failure likely 2/2 acute decompensated HFpEF  - CXR personally reviewed and noted to have blunting of costophrenic angles and BNP 17K could be 2/2 CHF exacerbation   - Cardiology following  - Monitor I&Os, daily weights, fluid restriction  - Telemetry   - Unable to do CTA chest given Cr and pt unable to lay flat for VQ scan  - TTE reviewed  - Resume Lasix 40mg qd per Cardio    Hyperkalemia   - Resolved  - monitor    MATILDE on CKD, Hyperkalemia  - Hyperkalemia resolved  - MATILDE improving  - monitor BMP, correct electrolytes as needed    Essential HTN  - Resumed home medications     Hypothyroid   - Resumed synthroid     Advance Directives: DNR/DNI    VTE ppx: heparin sq    Discussed POC with patient    PT recommending HUMBERTO    Attempted to call son, no answer.

## 2022-05-20 NOTE — PROGRESS NOTE ADULT - ASSESSMENT
89 year old male with a PMHx of HFpEF (60-65% 2019), aortic stenosis, chronic lymphedema, HTN, CKD, and hypothyroidism who presented to the ED with complaints of dyspnea. Patient states he never followed up with a Caridologist after 2019, and he no longer has a PMD since he is unable to leave the house. Patient states that for the last few months on and off he has had episodes of dyspnea on exertion, and his legs have been chronically swollen for years. Patient states that this morning he was acutely short of breath at rest so he came to the ER to be evaluated. Patient denies any fevers, chills, CP, syncope, near syncope, abdominal pain, N/V/D, headache, or dizziness.   pBNP 44637    5/17 - Patient is currently resting comfortably, states his breathing feels better then yesterday but he is still dyspneic. Patient was made DNR/DNI by the primary team. Patient's echocardiogram shows EF 40-45%, apex is hypokinetic, mild?mod MS, severe AS with BERNADETTE 0.77. Patient states that at this time he does not think he would want any invasive procedures due to his advanced age.   5/18: Patient states that he feels very lightheaded today, and just doesn't feel well. Patient's BUN/Cr continue to increase at this time, will hold diuresis.   5/19: Sitting up in bed, eating and states he feels a little better today  5/20- states feels the same as yesterday.

## 2022-05-20 NOTE — PROGRESS NOTE ADULT - PROBLEM SELECTOR PLAN 1
.  - Systolic  - Euvolemic on exam  - cont coreg, imdur  - lasix on hold for worsening renal function   - BMP pending today  - resume Lasix 40mg PO if Cr stable

## 2022-05-20 NOTE — PROGRESS NOTE ADULT - ASSESSMENT
89 y/o M w/ PMH of HFpEF (last known EF 2019 was 65%), AS, HTN, hypothyroid, chronic lymphedema, CKD IV came in c/o of sob that has been worsening the past few days.  Requiring supplemental O2 on arrival to maintain O2 sats.  Notable increased WOB and volume overloaded on exam.  BNP 17K and CXR w/ mild b/l pleural effusion.  s/p 60mg IV lasix in ED w/ improved symptoms.  Pt will be admitted for acute on chronic HFpEF.      Acute hypoxic respiratory failure likely 2/2 acute decompensated HFpEF  - CXR personally reviewed and noted to have blunting of costophrenic angles and BNP 17K could be 2/2 CHF exacerbation   - Cardiology following  - Monitor I&Os, daily weights, fluid restriction  - Telemetry   - Unable to do CTA chest given Cr and pt unable to lay flat for VQ scan  - TTE reviewed  - Hold Lasix    Hyperkalemia   - Resolved  - monitor    MATILDE on CKD, Hyperkalemia  - Hyperkalemia resolved  - MATILDE improving  - monitor BMP, correct electrolytes as needed    Essential HTN  - Resumed home medications     Hypothyroid   - Resumed synthroid     Advance Directives: DNR/DNI    VTE ppx: heparin sq    Discussed POC with patient    PT recommending HUMBERTO, awaiting Cardiology clearance.

## 2022-05-20 NOTE — PROGRESS NOTE ADULT - SUBJECTIVE AND OBJECTIVE BOX
[facility] Division of Hospital Medicine  Mj Cisneros MD    Chief Complaint:  Patient is a 89y old  Male who presents with a chief complaint of sob (20 May 2022 09:23)      SUBJECTIVE / OVERNIGHT EVENTS:  Patient seen and examined at bedside. No acute events reported overnight. No new complaints.    Patient denies chest pain, SOB, abd pain, N/V, fever, chills, dysuria or any other complaints. All remainder ROS negative.     MEDICATIONS  (STANDING):  aspirin enteric coated 81 milliGRAM(s) Oral daily  atorvastatin 40 milliGRAM(s) Oral at bedtime  carvedilol 3.125 milliGRAM(s) Oral every 12 hours  heparin   Injectable 5000 Unit(s) SubCutaneous every 12 hours  isosorbide   mononitrate ER Tablet (IMDUR) 30 milliGRAM(s) Oral daily  levothyroxine 50 MICROGram(s) Oral daily  sodium zirconium cyclosilicate 10 Gram(s) Oral daily    MEDICATIONS  (PRN):  acetaminophen     Tablet .. 650 milliGRAM(s) Oral every 6 hours PRN Temp greater or equal to 38C (100.4F), Mild Pain (1 - 3)  ALBUTerol    90 MICROgram(s) HFA Inhaler 1 Puff(s) Inhalation every 6 hours PRN Shortness of Breath and/or Wheezing  aluminum hydroxide/magnesium hydroxide/simethicone Suspension 30 milliLiter(s) Oral every 4 hours PRN Dyspepsia  melatonin 3 milliGRAM(s) Oral at bedtime PRN Insomnia        I&O's Summary    19 May 2022 07:01  -  20 May 2022 07:00  --------------------------------------------------------  IN: 600 mL / OUT: 770 mL / NET: -170 mL        PHYSICAL EXAM:  Vital Signs Last 24 Hrs  T(C): 36.8 (20 May 2022 09:31), Max: 36.8 (19 May 2022 17:38)  T(F): 98.2 (20 May 2022 09:31), Max: 98.2 (19 May 2022 17:38)  HR: 61 (20 May 2022 09:31) (61 - 83)  BP: 148/74 (20 May 2022 09:31) (121/62 - 156/62)  BP(mean): --  RR: 19 (20 May 2022 09:31) (18 - 19)  SpO2: 95% (20 May 2022 09:31) (95% - 100%)        CONSTITUTIONAL: Elderly male, pleasant, NAD  HEENT: NC/AT, PERRL, no JVD  RESPIRATORY: CTA bilaterally, normal effort  CARDIOVASCULAR: RRR, S1/S2+, no m/g/r  ABDOMEN: Nontender to palpation, normoactive bowel sounds, no rebound/guarding  MUSCULOSKELETAL: LE edema (improved)   PSYCH: Calm, affect appropriate.  NEUROLOGY: Awake, alert, no focal neurological deficits.   SKIN: No rashes; no palpable lesions  VASC: Distal pulses palpable    LABS:                        12.9   5.16  )-----------( 161      ( 19 May 2022 07:08 )             44.1     05-19    139  |  103  |  55.4<H>  ----------------------------<  89  5.0   |  22.0  |  1.98<H>    Ca    8.7      19 May 2022 07:08    TPro  6.7  /  Alb  3.4  /  TBili  0.3<L>  /  DBili  x   /  AST  21  /  ALT  14  /  AlkPhos  78  05-19              CAPILLARY BLOOD GLUCOSE            RADIOLOGY & ADDITIONAL TESTS:  Results Reviewed:   Imaging Personally Reviewed:  Electrocardiogram Personally Reviewed:

## 2022-05-20 NOTE — PROGRESS NOTE ADULT - PROBLEM SELECTOR PROBLEM 1
Acute systolic congestive heart failure
Hypertension
Acute systolic congestive heart failure
CHF (congestive heart failure)

## 2022-05-20 NOTE — PROGRESS NOTE ADULT - SUBJECTIVE AND OBJECTIVE BOX
[facility] Division of Hospital Medicine  Mj Cisneros MD    Chief Complaint:  Patient is a 89y old  Male who presents with a chief complaint of sob (20 May 2022 09:38)      SUBJECTIVE / OVERNIGHT EVENTS:  Patient seen and examined at bedside. No acute events reported overnight. No new complaints.    Patient denies chest pain, SOB, abd pain, N/V, fever, chills, dysuria or any other complaints. All remainder ROS negative.     MEDICATIONS  (STANDING):  aspirin enteric coated 81 milliGRAM(s) Oral daily  atorvastatin 40 milliGRAM(s) Oral at bedtime  carvedilol 3.125 milliGRAM(s) Oral every 12 hours  furosemide    Tablet 40 milliGRAM(s) Oral daily  heparin   Injectable 5000 Unit(s) SubCutaneous every 12 hours  isosorbide   mononitrate ER Tablet (IMDUR) 30 milliGRAM(s) Oral daily  levothyroxine 50 MICROGram(s) Oral daily  sodium zirconium cyclosilicate 10 Gram(s) Oral daily    MEDICATIONS  (PRN):  acetaminophen     Tablet .. 650 milliGRAM(s) Oral every 6 hours PRN Temp greater or equal to 38C (100.4F), Mild Pain (1 - 3)  ALBUTerol    90 MICROgram(s) HFA Inhaler 1 Puff(s) Inhalation every 6 hours PRN Shortness of Breath and/or Wheezing  aluminum hydroxide/magnesium hydroxide/simethicone Suspension 30 milliLiter(s) Oral every 4 hours PRN Dyspepsia  melatonin 3 milliGRAM(s) Oral at bedtime PRN Insomnia        I&O's Summary    19 May 2022 07:01  -  20 May 2022 07:00  --------------------------------------------------------  IN: 600 mL / OUT: 770 mL / NET: -170 mL        PHYSICAL EXAM:  Vital Signs Last 24 Hrs  T(C): 36.8 (20 May 2022 09:31), Max: 36.8 (19 May 2022 17:38)  T(F): 98.2 (20 May 2022 09:31), Max: 98.2 (19 May 2022 17:38)  HR: 61 (20 May 2022 09:31) (61 - 83)  BP: 148/74 (20 May 2022 09:31) (121/62 - 156/62)  BP(mean): --  RR: 19 (20 May 2022 09:31) (18 - 19)  SpO2: 95% (20 May 2022 09:31) (95% - 100%)        CONSTITUTIONAL: NAD  HEENT: NC/AT, PERRL, no JVD  RESPIRATORY: CTA bilaterally, normal effort  CARDIOVASCULAR: RRR, S1/S2+, no m/g/r  ABDOMEN: Nontender to palpation, normoactive bowel sounds, no rebound/guarding  MUSCULOSKELETAL: +LE edema  PSYCH: Calm, affect appropriate.  NEUROLOGY: Awake, alert, no focal neurological deficits.   SKIN: No rashes; no palpable lesions  VASC: Distal pulses palpable    LABS:                        12.9   5.16  )-----------( 161      ( 19 May 2022 07:08 )             44.1     05-20    140  |  102  |  49.6<H>  ----------------------------<  125<H>  4.5   |  28.0  |  1.89<H>    Ca    8.6      20 May 2022 10:26    TPro  6.7  /  Alb  3.4  /  TBili  0.3<L>  /  DBili  x   /  AST  21  /  ALT  14  /  AlkPhos  78  05-19              CAPILLARY BLOOD GLUCOSE            RADIOLOGY & ADDITIONAL TESTS:  Results Reviewed:   Imaging Personally Reviewed:  Electrocardiogram Personally Reviewed:

## 2022-05-21 LAB
ANION GAP SERPL CALC-SCNC: 13 MMOL/L — SIGNIFICANT CHANGE UP (ref 5–17)
BUN SERPL-MCNC: 45.9 MG/DL — HIGH (ref 8–20)
CALCIUM SERPL-MCNC: 8.3 MG/DL — LOW (ref 8.6–10.2)
CHLORIDE SERPL-SCNC: 104 MMOL/L — SIGNIFICANT CHANGE UP (ref 98–107)
CO2 SERPL-SCNC: 25 MMOL/L — SIGNIFICANT CHANGE UP (ref 22–29)
CREAT SERPL-MCNC: 1.64 MG/DL — HIGH (ref 0.5–1.3)
EGFR: 36 ML/MIN/1.73M2 — LOW
GLUCOSE SERPL-MCNC: 92 MG/DL — SIGNIFICANT CHANGE UP (ref 70–99)
HCT VFR BLD CALC: 40 % — SIGNIFICANT CHANGE UP (ref 39–50)
HGB BLD-MCNC: 12.2 G/DL — LOW (ref 13–17)
MCHC RBC-ENTMCNC: 30.5 GM/DL — LOW (ref 32–36)
MCHC RBC-ENTMCNC: 31.7 PG — SIGNIFICANT CHANGE UP (ref 27–34)
MCV RBC AUTO: 103.9 FL — HIGH (ref 80–100)
PLATELET # BLD AUTO: 197 K/UL — SIGNIFICANT CHANGE UP (ref 150–400)
POTASSIUM SERPL-MCNC: 4.2 MMOL/L — SIGNIFICANT CHANGE UP (ref 3.5–5.3)
POTASSIUM SERPL-SCNC: 4.2 MMOL/L — SIGNIFICANT CHANGE UP (ref 3.5–5.3)
RBC # BLD: 3.85 M/UL — LOW (ref 4.2–5.8)
RBC # FLD: 13.2 % — SIGNIFICANT CHANGE UP (ref 10.3–14.5)
SODIUM SERPL-SCNC: 142 MMOL/L — SIGNIFICANT CHANGE UP (ref 135–145)
WBC # BLD: 6.15 K/UL — SIGNIFICANT CHANGE UP (ref 3.8–10.5)
WBC # FLD AUTO: 6.15 K/UL — SIGNIFICANT CHANGE UP (ref 3.8–10.5)

## 2022-05-21 PROCEDURE — 93010 ELECTROCARDIOGRAM REPORT: CPT

## 2022-05-21 PROCEDURE — 99232 SBSQ HOSP IP/OBS MODERATE 35: CPT

## 2022-05-21 RX ADMIN — HEPARIN SODIUM 5000 UNIT(S): 5000 INJECTION INTRAVENOUS; SUBCUTANEOUS at 21:49

## 2022-05-21 RX ADMIN — ISOSORBIDE MONONITRATE 30 MILLIGRAM(S): 60 TABLET, EXTENDED RELEASE ORAL at 10:53

## 2022-05-21 RX ADMIN — ATORVASTATIN CALCIUM 40 MILLIGRAM(S): 80 TABLET, FILM COATED ORAL at 21:49

## 2022-05-21 RX ADMIN — Medication 81 MILLIGRAM(S): at 10:53

## 2022-05-21 RX ADMIN — Medication 50 MICROGRAM(S): at 05:34

## 2022-05-21 RX ADMIN — CARVEDILOL PHOSPHATE 3.12 MILLIGRAM(S): 80 CAPSULE, EXTENDED RELEASE ORAL at 18:32

## 2022-05-21 RX ADMIN — Medication 40 MILLIGRAM(S): at 05:34

## 2022-05-21 RX ADMIN — CARVEDILOL PHOSPHATE 3.12 MILLIGRAM(S): 80 CAPSULE, EXTENDED RELEASE ORAL at 05:34

## 2022-05-21 RX ADMIN — HEPARIN SODIUM 5000 UNIT(S): 5000 INJECTION INTRAVENOUS; SUBCUTANEOUS at 10:53

## 2022-05-21 NOTE — PROGRESS NOTE ADULT - SUBJECTIVE AND OBJECTIVE BOX
[facility] Division of Hospital Medicine  Mj Cisneros MD    Chief Complaint:  Patient is a 89y old  Male who presents with a chief complaint of sob (20 May 2022 11:15)      SUBJECTIVE / OVERNIGHT EVENTS:  Patient seen and examined at bedside. No acute events reported overnight. No new complaints.    Patient denies chest pain, SOB, abd pain, N/V, fever, chills, dysuria or any other complaints. All remainder ROS negative.     MEDICATIONS  (STANDING):  aspirin enteric coated 81 milliGRAM(s) Oral daily  atorvastatin 40 milliGRAM(s) Oral at bedtime  carvedilol 3.125 milliGRAM(s) Oral every 12 hours  furosemide    Tablet 40 milliGRAM(s) Oral daily  heparin   Injectable 5000 Unit(s) SubCutaneous every 12 hours  isosorbide   mononitrate ER Tablet (IMDUR) 30 milliGRAM(s) Oral daily  levothyroxine 50 MICROGram(s) Oral daily    MEDICATIONS  (PRN):  acetaminophen     Tablet .. 650 milliGRAM(s) Oral every 6 hours PRN Temp greater or equal to 38C (100.4F), Mild Pain (1 - 3)  ALBUTerol    90 MICROgram(s) HFA Inhaler 1 Puff(s) Inhalation every 6 hours PRN Shortness of Breath and/or Wheezing  aluminum hydroxide/magnesium hydroxide/simethicone Suspension 30 milliLiter(s) Oral every 4 hours PRN Dyspepsia  melatonin 3 milliGRAM(s) Oral at bedtime PRN Insomnia        I&O's Summary    20 May 2022 07:01  -  21 May 2022 07:00  --------------------------------------------------------  IN: 250 mL / OUT: 1600 mL / NET: -1350 mL        PHYSICAL EXAM:  Vital Signs Last 24 Hrs  T(C): 36.7 (21 May 2022 04:51), Max: 36.8 (20 May 2022 09:31)  T(F): 98.1 (21 May 2022 04:51), Max: 98.2 (20 May 2022 09:31)  HR: 74 (21 May 2022 04:51) (61 - 74)  BP: 150/67 (21 May 2022 04:51) (104/51 - 150/67)  BP(mean): --  RR: 18 (21 May 2022 04:51) (18 - 19)  SpO2: 98% (21 May 2022 04:51) (95% - 98%)        CONSTITUTIONAL: NAD  HEENT: NC/AT, PERRL, no JVD  RESPIRATORY: CTA bilaterally, normal effort  CARDIOVASCULAR: RRR, S1/S2+, no m/g/r  ABDOMEN: Nontender to palpation, normoactive bowel sounds, no rebound/guarding  MUSCULOSKELETAL: LE edema (improved)  PSYCH: Calm, affect appropriate.  NEUROLOGY: Awake, alert, no focal neurological deficits.   SKIN: No rashes; no palpable lesions  VASC: Distal pulses palpable    LABS:                        12.2   6.15  )-----------( 197      ( 21 May 2022 07:41 )             40.0     05-21    142  |  104  |  45.9<H>  ----------------------------<  92  4.2   |  25.0  |  1.64<H>    Ca    8.3<L>      21 May 2022 07:41                CAPILLARY BLOOD GLUCOSE            RADIOLOGY & ADDITIONAL TESTS:  Results Reviewed:   Imaging Personally Reviewed:  Electrocardiogram Personally Reviewed:

## 2022-05-21 NOTE — CHART NOTE - NSCHARTNOTEFT_GEN_A_CORE
STAT EKG ordered prior to possible ST depression seen on monitor by monitor tech. upon change of shift. Notified by RN Yasmin, impression of EKG read by Physician indicating wechenback. Pt. seen at bedside, lying in bed, indicates no question or concerns, vitals wnl/asymptomatic.  Morning labs CBC/CMP/MAg/Phos ordered for am. RN to continue monitor and communicate any changes to PA. STAT EKG ordered prior to possible ST depression seen on monitor by monitor tech. upon change of shift. No changes seen from prior EKG, no ST elevations or ST depressions. Pt. seen at bedside, lying in bed, indicates no question or concerns, vitals wnl/asymptomatic.  Morning labs CBC/CMP/MAg/Phos ordered for am. RN to continue monitor and communicate any changes to PA.

## 2022-05-21 NOTE — PROGRESS NOTE ADULT - ASSESSMENT
89 y/o M w/ PMH of HFpEF (last known EF 2019 was 65%), AS, HTN, hypothyroid, chronic lymphedema, CKD IV came in c/o of sob that has been worsening the past few days.  Requiring supplemental O2 on arrival to maintain O2 sats.  Notable increased WOB and volume overloaded on exam.  BNP 17K and CXR w/ mild b/l pleural effusion.  s/p 60mg IV lasix in ED w/ improved symptoms.  Pt will be admitted for acute on chronic HFpEF.      Acute hypoxic respiratory failure likely 2/2 acute decompensated HFpEF  - CXR personally reviewed and noted to have blunting of costophrenic angles and BNP 17K could be 2/2 CHF exacerbation   - Cardiology following  - Monitor I&Os, daily weights, fluid restriction  - Telemetry   - Unable to do CTA chest given Cr and pt unable to lay flat for VQ scan  - TTE reviewed  - Resume Lasix 40mg qd per Cardio    Hyperkalemia   - Resolved  - monitor    MATILDE on CKD, Hyperkalemia  - Hyperkalemia resolved  - MATILDE improving  - monitor BMP, correct electrolytes as needed    Essential HTN  - Resumed home medications     Hypothyroid   - Resumed synthroid     Advance Directives: DNR/DNI    Awaiting auth for HUMBERTO.  VTE ppx: heparin sq    Discussed POC with patient    PT recommending HUMBERTO    Attempted to call son, no answer.

## 2022-05-22 LAB
ALBUMIN SERPL ELPH-MCNC: 3.4 G/DL — SIGNIFICANT CHANGE UP (ref 3.3–5.2)
ALP SERPL-CCNC: 66 U/L — SIGNIFICANT CHANGE UP (ref 40–120)
ALT FLD-CCNC: 16 U/L — SIGNIFICANT CHANGE UP
ANION GAP SERPL CALC-SCNC: 14 MMOL/L — SIGNIFICANT CHANGE UP (ref 5–17)
AST SERPL-CCNC: 16 U/L — SIGNIFICANT CHANGE UP
BILIRUB SERPL-MCNC: 0.5 MG/DL — SIGNIFICANT CHANGE UP (ref 0.4–2)
BUN SERPL-MCNC: 43.3 MG/DL — HIGH (ref 8–20)
CALCIUM SERPL-MCNC: 8.7 MG/DL — SIGNIFICANT CHANGE UP (ref 8.6–10.2)
CHLORIDE SERPL-SCNC: 102 MMOL/L — SIGNIFICANT CHANGE UP (ref 98–107)
CO2 SERPL-SCNC: 27 MMOL/L — SIGNIFICANT CHANGE UP (ref 22–29)
CREAT SERPL-MCNC: 1.57 MG/DL — HIGH (ref 0.5–1.3)
EGFR: 37 ML/MIN/1.73M2 — LOW
GLUCOSE SERPL-MCNC: 96 MG/DL — SIGNIFICANT CHANGE UP (ref 70–99)
HCT VFR BLD CALC: 41.2 % — SIGNIFICANT CHANGE UP (ref 39–50)
HGB BLD-MCNC: 12.4 G/DL — LOW (ref 13–17)
MAGNESIUM SERPL-MCNC: 2.1 MG/DL — SIGNIFICANT CHANGE UP (ref 1.6–2.6)
MCHC RBC-ENTMCNC: 30.1 GM/DL — LOW (ref 32–36)
MCHC RBC-ENTMCNC: 31.2 PG — SIGNIFICANT CHANGE UP (ref 27–34)
MCV RBC AUTO: 103.8 FL — HIGH (ref 80–100)
PHOSPHATE SERPL-MCNC: 2.7 MG/DL — SIGNIFICANT CHANGE UP (ref 2.4–4.7)
PLATELET # BLD AUTO: 208 K/UL — SIGNIFICANT CHANGE UP (ref 150–400)
POTASSIUM SERPL-MCNC: 4.1 MMOL/L — SIGNIFICANT CHANGE UP (ref 3.5–5.3)
POTASSIUM SERPL-SCNC: 4.1 MMOL/L — SIGNIFICANT CHANGE UP (ref 3.5–5.3)
PROT SERPL-MCNC: 6.6 G/DL — SIGNIFICANT CHANGE UP (ref 6.6–8.7)
RBC # BLD: 3.97 M/UL — LOW (ref 4.2–5.8)
RBC # FLD: 13.2 % — SIGNIFICANT CHANGE UP (ref 10.3–14.5)
SODIUM SERPL-SCNC: 143 MMOL/L — SIGNIFICANT CHANGE UP (ref 135–145)
WBC # BLD: 5.73 K/UL — SIGNIFICANT CHANGE UP (ref 3.8–10.5)
WBC # FLD AUTO: 5.73 K/UL — SIGNIFICANT CHANGE UP (ref 3.8–10.5)

## 2022-05-22 PROCEDURE — 99233 SBSQ HOSP IP/OBS HIGH 50: CPT

## 2022-05-22 RX ADMIN — HEPARIN SODIUM 5000 UNIT(S): 5000 INJECTION INTRAVENOUS; SUBCUTANEOUS at 23:12

## 2022-05-22 RX ADMIN — ATORVASTATIN CALCIUM 40 MILLIGRAM(S): 80 TABLET, FILM COATED ORAL at 23:12

## 2022-05-22 RX ADMIN — HEPARIN SODIUM 5000 UNIT(S): 5000 INJECTION INTRAVENOUS; SUBCUTANEOUS at 08:54

## 2022-05-22 RX ADMIN — Medication 40 MILLIGRAM(S): at 05:48

## 2022-05-22 RX ADMIN — CARVEDILOL PHOSPHATE 3.12 MILLIGRAM(S): 80 CAPSULE, EXTENDED RELEASE ORAL at 05:48

## 2022-05-22 RX ADMIN — Medication 81 MILLIGRAM(S): at 08:54

## 2022-05-22 RX ADMIN — CARVEDILOL PHOSPHATE 3.12 MILLIGRAM(S): 80 CAPSULE, EXTENDED RELEASE ORAL at 17:03

## 2022-05-22 RX ADMIN — ISOSORBIDE MONONITRATE 30 MILLIGRAM(S): 60 TABLET, EXTENDED RELEASE ORAL at 08:54

## 2022-05-22 RX ADMIN — Medication 50 MICROGRAM(S): at 05:48

## 2022-05-22 NOTE — DIETITIAN INITIAL EVALUATION ADULT - ADD RECOMMEND
RX:  1) Continue current diet as ordered  2) Monitor fluid intake; I/O's; weights  3) Monitor nutrition related labs

## 2022-05-22 NOTE — PROGRESS NOTE ADULT - SUBJECTIVE AND OBJECTIVE BOX
[facility] Division of Hospital Medicine  Mj Cisneros MD    Chief Complaint:  Patient is a 89y old  Male who presents with a chief complaint of sob (21 May 2022 08:53)      SUBJECTIVE / OVERNIGHT EVENTS:  Patient seen and examined at bedside. Overnight, pt noted to have possible ST depressions on telemetry. EKG reviewed no dyanmic changes. Pt remained asymptomatic. This morning denies any complaitns.    Patient denies chest pain, SOB, abd pain, N/V, fever, chills, dysuria or any other complaints. All remainder ROS negative.     MEDICATIONS  (STANDING):  aspirin enteric coated 81 milliGRAM(s) Oral daily  atorvastatin 40 milliGRAM(s) Oral at bedtime  carvedilol 3.125 milliGRAM(s) Oral every 12 hours  furosemide    Tablet 40 milliGRAM(s) Oral daily  heparin   Injectable 5000 Unit(s) SubCutaneous every 12 hours  isosorbide   mononitrate ER Tablet (IMDUR) 30 milliGRAM(s) Oral daily  levothyroxine 50 MICROGram(s) Oral daily    MEDICATIONS  (PRN):  acetaminophen     Tablet .. 650 milliGRAM(s) Oral every 6 hours PRN Temp greater or equal to 38C (100.4F), Mild Pain (1 - 3)  ALBUTerol    90 MICROgram(s) HFA Inhaler 1 Puff(s) Inhalation every 6 hours PRN Shortness of Breath and/or Wheezing  aluminum hydroxide/magnesium hydroxide/simethicone Suspension 30 milliLiter(s) Oral every 4 hours PRN Dyspepsia  melatonin 3 milliGRAM(s) Oral at bedtime PRN Insomnia        I&O's Summary    21 May 2022 07:01  -  22 May 2022 07:00  --------------------------------------------------------  IN: 0 mL / OUT: 1500 mL / NET: -1500 mL        PHYSICAL EXAM:  Vital Signs Last 24 Hrs  T(C): 36.7 (22 May 2022 08:38), Max: 36.7 (21 May 2022 21:55)  T(F): 98 (22 May 2022 08:38), Max: 98.1 (21 May 2022 21:55)  HR: 70 (22 May 2022 08:38) (60 - 71)  BP: 131/60 (22 May 2022 08:38) (123/61 - 136/64)  BP(mean): --  RR: 18 (22 May 2022 08:38) (18 - 20)  SpO2: 96% (22 May 2022 08:38) (96% - 99%)        CONSTITUTIONAL: Elderly male, pleasant, NAD  HEENT: NC/AT, PERRL, no JVD  RESPIRATORY: CTA bilaterally, normal effort  CARDIOVASCULAR: RRR, S1/S2+, no m/g/r  ABDOMEN: Nontender to palpation, normoactive bowel sounds, no rebound/guarding  MUSCULOSKELETAL: +LE edema (improved)  PSYCH: Calm, affect appropriate.  NEUROLOGY: Awake, alert, no focal neurological deficits.   SKIN: No rashes; no palpable lesions  VASC: Distal pulses palpable    LABS:                        12.4   5.73  )-----------( 208      ( 22 May 2022 08:08 )             41.2     05-22    143  |  102  |  43.3<H>  ----------------------------<  96  4.1   |  27.0  |  1.57<H>    Ca    8.7      22 May 2022 08:08  Phos  2.7     05-22  Mg     2.1     05-22    TPro  6.6  /  Alb  3.4  /  TBili  0.5  /  DBili  x   /  AST  16  /  ALT  16  /  AlkPhos  66  05-22              CAPILLARY BLOOD GLUCOSE            RADIOLOGY & ADDITIONAL TESTS:  Results Reviewed:   Imaging Personally Reviewed:  Electrocardiogram Personally Reviewed:

## 2022-05-22 NOTE — DIETITIAN NUTRITION RISK NOTIFICATION - ADDITIONAL COMMENTS/DIETITIAN RECOMMENDATIONS
1) Continue current diet as ordered  2) Monitor fluid intake; I/O's; weights  3) Monitor nutrition related labs

## 2022-05-22 NOTE — DIETITIAN NUTRITION RISK NOTIFICATION - TREATMENT: THE FOLLOWING DIET HAS BEEN RECOMMENDED
Diet, Regular:   DASH/TLC {Sodium & Cholesterol Restricted} (DASH) (05-17-22 @ 02:04) [Active]

## 2022-05-22 NOTE — DIETITIAN INITIAL EVALUATION ADULT - OTHER INFO
89 y/o M w/ PMH of HFpEF (last known EF 2019 was 65%), AS, HTN, hypothyroid, chronic lymphedema, CKD IV came in c/o of sob that has been worsening the past few days.  Requiring supplemental O2 on arrival to maintain O2 sats.  Notable increased WOB and volume overloaded on exam.  BNP 17K and CXR w/ mild b/l pleural effusion.  s/p 60mg IV lasix in ED w/ improved symptoms.  Pt will be admitted for acute on chronic HFpEF.

## 2022-05-22 NOTE — DIETITIAN INITIAL EVALUATION ADULT - PERTINENT MEDS FT
MEDICATIONS  (STANDING):  aspirin enteric coated 81 milliGRAM(s) Oral daily  atorvastatin 40 milliGRAM(s) Oral at bedtime  carvedilol 3.125 milliGRAM(s) Oral every 12 hours  furosemide    Tablet 40 milliGRAM(s) Oral daily  heparin   Injectable 5000 Unit(s) SubCutaneous every 12 hours  isosorbide   mononitrate ER Tablet (IMDUR) 30 milliGRAM(s) Oral daily  levothyroxine 50 MICROGram(s) Oral daily    MEDICATIONS  (PRN):  acetaminophen     Tablet .. 650 milliGRAM(s) Oral every 6 hours PRN Temp greater or equal to 38C (100.4F), Mild Pain (1 - 3)  ALBUTerol    90 MICROgram(s) HFA Inhaler 1 Puff(s) Inhalation every 6 hours PRN Shortness of Breath and/or Wheezing  aluminum hydroxide/magnesium hydroxide/simethicone Suspension 30 milliLiter(s) Oral every 4 hours PRN Dyspepsia  melatonin 3 milliGRAM(s) Oral at bedtime PRN Insomnia

## 2022-05-22 NOTE — DIETITIAN INITIAL EVALUATION ADULT - ORAL INTAKE PTA/DIET HISTORY
Patient interview at bedside after breakfast. In good spirits. Patient is hard of hearing, requires increased volume of voice when speaking. Reports "feeling 6 out of 10, usually a 10". PO intake a little less than normal since admission, but still meeting ~75% estimated nutrition needs. Patient reports a high animal protein diet with some vegetables from the garden when home. Provided some education, as able and appropriate, regarding limited protein intake with CKD IV without HD. Provided recommendations regarding monitoring fluid intake related to dx pulmonary edema. Patient will require reinforcement. Weight stable. Recommendations to follow. RD to followup prn.

## 2022-05-22 NOTE — DIETITIAN INITIAL EVALUATION ADULT - PERTINENT LABORATORY DATA
05-22    143  |  102  |  43.3<H>  ----------------------------<  96  4.1   |  27.0  |  1.57<H>    Ca    8.7      22 May 2022 08:08  Phos  2.7     05-22  Mg     2.1     05-22    TPro  6.6  /  Alb  3.4  /  TBili  0.5  /  DBili  x   /  AST  16  /  ALT  16  /  AlkPhos  66  05-22

## 2022-05-22 NOTE — DIETITIAN INITIAL EVALUATION ADULT - NSFNSADHERENCEPTAFT_GEN_A_CORE
Patient reported 100% PO intake PTA with steaks, chicken and turkey wings. Garden vegetables in summer. Lives alone.

## 2022-05-22 NOTE — DIETITIAN INITIAL EVALUATION ADULT - ETIOLOGY
related to multiple comorbidities in the setting of advanced age and dx CHF, HTN, chronic pulmonary edema, and CKD IV

## 2022-05-22 NOTE — PROGRESS NOTE ADULT - ASSESSMENT
89 y/o M w/ PMH of HFpEF (last known EF 2019 was 65%), AS, HTN, hypothyroid, chronic lymphedema, CKD IV came in c/o of sob that has been worsening the past few days.  Requiring supplemental O2 on arrival to maintain O2 sats.  Notable increased WOB and volume overloaded on exam.  BNP 17K and CXR w/ mild b/l pleural effusion.  s/p 60mg IV lasix in ED w/ improved symptoms.  Pt will be admitted for acute on chronic HFpEF.      Acute hypoxic respiratory failure likely 2/2 acute decompensated HFpEF  - CXR personally reviewed and noted to have blunting of costophrenic angles and BNP 17K could be 2/2 CHF exacerbation   - Cardiology following  - Monitor I&Os, daily weights, fluid restriction  - Unable to do CTA chest given Cr and pt unable to lay flat for VQ scan  - TTE reviewed  - Resume Lasix 40mg qd per Cardio    ST depressions on telemetry  - EKG reviewed, no dynamic changes  - Labs unremarkable  - Patient denying any chest pain  - Will closely monitor    Hyperkalemia   - Resolved  - monitor    MATILDE on CKD, Hyperkalemia  - Hyperkalemia resolved  - MATILDE improving  - monitor BMP, correct electrolytes as needed    Essential HTN  - Resumed home medications     Hypothyroid   - Resumed synthroid     Advance Directives: DNR/DNI    Awaiting auth for HUMBERTO.    VTE ppx: heparin sq    Discussed POC with patient    PT recommending HUMBERTO

## 2022-05-23 DIAGNOSIS — E03.9 HYPOTHYROIDISM, UNSPECIFIED: ICD-10-CM

## 2022-05-23 LAB — SARS-COV-2 RNA SPEC QL NAA+PROBE: SIGNIFICANT CHANGE UP

## 2022-05-23 PROCEDURE — 99232 SBSQ HOSP IP/OBS MODERATE 35: CPT

## 2022-05-23 RX ORDER — POTASSIUM CHLORIDE 20 MEQ
1 PACKET (EA) ORAL
Qty: 0 | Refills: 0 | DISCHARGE

## 2022-05-23 RX ORDER — ATORVASTATIN CALCIUM 80 MG/1
1 TABLET, FILM COATED ORAL
Qty: 0 | Refills: 0 | DISCHARGE
Start: 2022-05-23

## 2022-05-23 RX ORDER — CARVEDILOL PHOSPHATE 80 MG/1
1 CAPSULE, EXTENDED RELEASE ORAL
Qty: 0 | Refills: 0 | DISCHARGE
Start: 2022-05-23

## 2022-05-23 RX ORDER — ASPIRIN/CALCIUM CARB/MAGNESIUM 324 MG
1 TABLET ORAL
Qty: 0 | Refills: 0 | DISCHARGE
Start: 2022-05-23

## 2022-05-23 RX ORDER — FUROSEMIDE 40 MG
1 TABLET ORAL
Qty: 0 | Refills: 0 | DISCHARGE
Start: 2022-05-23

## 2022-05-23 RX ADMIN — CARVEDILOL PHOSPHATE 3.12 MILLIGRAM(S): 80 CAPSULE, EXTENDED RELEASE ORAL at 05:48

## 2022-05-23 RX ADMIN — Medication 40 MILLIGRAM(S): at 05:48

## 2022-05-23 RX ADMIN — ISOSORBIDE MONONITRATE 30 MILLIGRAM(S): 60 TABLET, EXTENDED RELEASE ORAL at 18:25

## 2022-05-23 RX ADMIN — Medication 50 MICROGRAM(S): at 05:48

## 2022-05-23 RX ADMIN — ATORVASTATIN CALCIUM 40 MILLIGRAM(S): 80 TABLET, FILM COATED ORAL at 23:20

## 2022-05-23 RX ADMIN — CARVEDILOL PHOSPHATE 3.12 MILLIGRAM(S): 80 CAPSULE, EXTENDED RELEASE ORAL at 18:25

## 2022-05-23 RX ADMIN — HEPARIN SODIUM 5000 UNIT(S): 5000 INJECTION INTRAVENOUS; SUBCUTANEOUS at 23:20

## 2022-05-23 RX ADMIN — Medication 81 MILLIGRAM(S): at 18:25

## 2022-05-23 RX ADMIN — HEPARIN SODIUM 5000 UNIT(S): 5000 INJECTION INTRAVENOUS; SUBCUTANEOUS at 09:29

## 2022-05-23 NOTE — DISCHARGE NOTE PROVIDER - ATTENDING DISCHARGE PHYSICAL EXAMINATION:
Vital Signs Last 24 Hrs  T(F): 97.9 (23 May 2022 10:15), Max: 98.9 (22 May 2022 23:14)  HR: 70 (23 May 2022 10:15) (61 - 81)  BP: 124/55 (23 May 2022 10:15) (115/55 - 160/60)  RR: 18 (23 May 2022 10:15) (18 - 20)  SpO2: 99% (23 May 2022 10:15) (95% - 100%)    Physical Exam:  Constitutional: NAD  HEENT: NC/AT, PERRL, EOMI, trachea midline, no JVD  Respiratory: CTA bilaterally, symmetrical chest rise  Cardiovascular: RRR, no m/g/r  Gastrointestinal: Soft, NT/ND, BS+  Vascular: 2+ peripheral pulses  Neurological: A/O x 3, no focal neurological deficits  Psych: Fair mood/affect  Musculoskeletal: No edema, cyanosis, deformities. ROM normal  Skin: No obvious rash, lesions. No jaundice.

## 2022-05-23 NOTE — DISCHARGE NOTE NURSING/CASE MANAGEMENT/SOCIAL WORK - NSDCVIVACCINE_GEN_ALL_CORE_FT
influenza, injectable, quadrivalent, preservative free; 05-Oct-2017 14:30; Sangeeta Nieto (RN); [employer]; [id number]; IntraMuscular; Deltoid Left.; 0.5 milliLiter(s); VIS (VIS Published: 07-Aug-2015, VIS Presented: 05-Oct-2017);

## 2022-05-23 NOTE — DISCHARGE NOTE NURSING/CASE MANAGEMENT/SOCIAL WORK - PATIENT PORTAL LINK FT
You can access the FollowMyHealth Patient Portal offered by [facility] by registering at the following website: [email or web address]. By joining [facility] FollowMyHealth portal, you will also be able to view your health information using other applications (apps) compatible with our system.

## 2022-05-23 NOTE — PROGRESS NOTE ADULT - NUTRITIONAL ASSESSMENT
This patient has been assessed with a concern for Malnutrition and has been determined to have a diagnosis/diagnoses of Moderate protein-calorie malnutrition.    This patient is being managed with:   Diet Regular-  DASH/TLC {Sodium & Cholesterol Restricted} (DASH)  Entered: May 17 2022  2:03AM

## 2022-05-23 NOTE — DISCHARGE NOTE PROVIDER - DISCHARGE DATE
23-May-2022
Airway patent. Nasal mucosa clear. Mouth with normal mucosa. Throat has no vesicles, no oropharyngeal exudates and uvula is midline.

## 2022-05-23 NOTE — DISCHARGE NOTE PROVIDER - NSDCCPCAREPLAN_GEN_ALL_CORE_FT
PRINCIPAL DISCHARGE DIAGNOSIS  Diagnosis: Acute diastolic congestive heart failure  Assessment and Plan of Treatment:       SECONDARY DISCHARGE DIAGNOSES  Diagnosis: Hyperkalemia  Assessment and Plan of Treatment: resolved    Diagnosis: Acute kidney injury superimposed on CKD  Assessment and Plan of Treatment:

## 2022-05-23 NOTE — DISCHARGE NOTE PROVIDER - HOSPITAL COURSE
89 year old male with history of HFpEF, AS, HTN, hypothyroid, chronic lymphedema, CKD IV presented to [facility] ED with worsening sob several days prior to admission. In the ED, found to be hypoxic requiring supplemental oxygen, exam was note patient in volume overload, BNP 17k, CXR mild b/l pleural effusion. Patient was admitted for acute on chronic hypoxic respiratory failure 2/2 acute decompensated HFpEF. Patient unable to have CTA/VQ during this admission. Patient was seen by cardiology and his oral lasix was resumed. The patient was evaluated by our Physical Therapy team, and it was recommended that they be discharged to [facility]. Patient is medically stable for discharge to [facility].    Length of discharge: 33mins

## 2022-05-23 NOTE — DISCHARGE NOTE PROVIDER - NSDCMRMEDTOKEN_GEN_ALL_CORE_FT
carvedilol 6.25 mg oral tablet: 1 tab(s) orally every 12 hours  furosemide 40 mg oral tablet: 1 tab(s) orally 2 times a day  isosorbide mononitrate 30 mg oral tablet, extended release: 1 tab(s) orally once a day  levothyroxine 50 mcg (0.05 mg) oral tablet: 1 tab(s) orally once a day  potassium chloride 20 mEq oral tablet, extended release: 1 tab(s) orally once a day   aspirin 81 mg oral delayed release tablet: 1 tab(s) orally once a day  atorvastatin 40 mg oral tablet: 1 tab(s) orally once a day (at bedtime)  Coreg 3.125 mg oral tablet: 1 tab(s) orally every 12 hours  isosorbide mononitrate 30 mg oral tablet, extended release: 1 tab(s) orally once a day  Lasix 40 mg oral tablet: 1 tab(s) orally once a day  levothyroxine 50 mcg (0.05 mg) oral tablet: 1 tab(s) orally once a day

## 2022-05-23 NOTE — DISCHARGE NOTE NURSING/CASE MANAGEMENT/SOCIAL WORK - NSDCPEFALRISK_GEN_ALL_CORE
For information on Fall & Injury Prevention, visit: [email or web address] OR  [email or web address] OR  https://www.cdc.gov/steadi/patient.html

## 2022-05-23 NOTE — DISCHARGE NOTE PROVIDER - DETAILS OF MALNUTRITION DIAGNOSIS/DIAGNOSES
This patient has been assessed with a concern for Malnutrition and was treated during this hospitalization for the following Nutrition diagnosis/diagnoses:     -  05/22/2022: Moderate protein-calorie malnutrition

## 2022-05-23 NOTE — PROGRESS NOTE ADULT - PROVIDER SPECIALTY LIST ADULT
Hospitalist
Cardiology
Cardiology
Hospitalist
Cardiology
Cardiology

## 2022-05-23 NOTE — PROGRESS NOTE ADULT - SUBJECTIVE AND OBJECTIVE BOX
[facility] Division of Hospital Medicine  Mj Cisneros MD    Chief Complaint:  Patient is a 89y old  Male who presents with a chief complaint of Chronic pulmonary edema     (22 May 2022 13:05)      SUBJECTIVE / OVERNIGHT EVENTS:  Patient seen and examined at bedside. No acute events reported overnight. No new complaints.    Patient denies chest pain, SOB, abd pain, N/V, fever, chills, dysuria or any other complaints. All remainder ROS negative.     MEDICATIONS  (STANDING):  aspirin enteric coated 81 milliGRAM(s) Oral daily  atorvastatin 40 milliGRAM(s) Oral at bedtime  carvedilol 3.125 milliGRAM(s) Oral every 12 hours  furosemide    Tablet 40 milliGRAM(s) Oral daily  heparin   Injectable 5000 Unit(s) SubCutaneous every 12 hours  isosorbide   mononitrate ER Tablet (IMDUR) 30 milliGRAM(s) Oral daily  levothyroxine 50 MICROGram(s) Oral daily    MEDICATIONS  (PRN):  acetaminophen     Tablet .. 650 milliGRAM(s) Oral every 6 hours PRN Temp greater or equal to 38C (100.4F), Mild Pain (1 - 3)  ALBUTerol    90 MICROgram(s) HFA Inhaler 1 Puff(s) Inhalation every 6 hours PRN Shortness of Breath and/or Wheezing  aluminum hydroxide/magnesium hydroxide/simethicone Suspension 30 milliLiter(s) Oral every 4 hours PRN Dyspepsia  melatonin 3 milliGRAM(s) Oral at bedtime PRN Insomnia        I&O's Summary    22 May 2022 07:01  -  23 May 2022 07:00  --------------------------------------------------------  IN: 0 mL / OUT: 500 mL / NET: -500 mL        PHYSICAL EXAM:  Vital Signs Last 24 Hrs  T(C): 36.6 (23 May 2022 10:15), Max: 37.2 (22 May 2022 23:14)  T(F): 97.9 (23 May 2022 10:15), Max: 98.9 (22 May 2022 23:14)  HR: 70 (23 May 2022 10:15) (61 - 81)  BP: 124/55 (23 May 2022 10:15) (115/55 - 160/60)  BP(mean): --  RR: 18 (23 May 2022 10:15) (18 - 20)  SpO2: 99% (23 May 2022 10:15) (95% - 100%)        CONSTITUTIONAL: NAD  HEENT: NC/AT, PERRL, no JVD  RESPIRATORY: CTA bilaterally, normal effort  CARDIOVASCULAR: RRR, S1/S2+, no m/g/r  ABDOMEN: Nontender to palpation, normoactive bowel sounds, no rebound/guarding  MUSCULOSKELETAL: + b/l LE Edema (improved)  PSYCH: Calm, affect appropriate.  NEUROLOGY: Awake, alert, no focal neurological deficits.   SKIN: No rashes; no palpable lesions  VASC: Distal pulses palpable    LABS:                        12.4   5.73  )-----------( 208      ( 22 May 2022 08:08 )             41.2     05-22    143  |  102  |  43.3<H>  ----------------------------<  96  4.1   |  27.0  |  1.57<H>    Ca    8.7      22 May 2022 08:08  Phos  2.7     05-22  Mg     2.1     05-22    TPro  6.6  /  Alb  3.4  /  TBili  0.5  /  DBili  x   /  AST  16  /  ALT  16  /  AlkPhos  66  05-22              CAPILLARY BLOOD GLUCOSE            RADIOLOGY & ADDITIONAL TESTS:  Results Reviewed:   Imaging Personally Reviewed:  Electrocardiogram Personally Reviewed:

## 2022-05-23 NOTE — PROGRESS NOTE ADULT - ASSESSMENT
89 y/o M w/ PMH of HFpEF (last known EF 2019 was 65%), AS, HTN, hypothyroid, chronic lymphedema, CKD IV came in c/o of sob that has been worsening the past few days.  Requiring supplemental O2 on arrival to maintain O2 sats.  Notable increased WOB and volume overloaded on exam.  BNP 17K and CXR w/ mild b/l pleural effusion.  s/p 60mg IV lasix in ED w/ improved symptoms.  Pt will be admitted for acute on chronic HFpEF.      Acute hypoxic respiratory failure likely 2/2 acute decompensated HFpEF  - CXR personally reviewed and noted to have blunting of costophrenic angles and BNP 17K could be 2/2 CHF exacerbation   - Cardiology following  - Monitor I&Os, daily weights, fluid restriction  - Unable to do CTA chest given Cr and pt unable to lay flat for VQ scan  - TTE reviewed  - Resume Lasix 40mg qd per Cardio    ST depressions on telemetry  - Resolved    Hyperkalemia   - Resolved  - monitor    MATILDE on CKD, Hyperkalemia  - Hyperkalemia resolved  - MATILDE improving  - monitor BMP, correct electrolytes as needed    Essential HTN  - Resumed home medications     Hypothyroid   - Resumed synthroid     Advance Directives: DNR/DNI    Awaiting auth for HUMBERTO.    VTE ppx: heparin sq    Discussed POC with patient    PT recommending HUMBERTO

## 2022-05-24 VITALS
DIASTOLIC BLOOD PRESSURE: 67 MMHG | TEMPERATURE: 98 F | RESPIRATION RATE: 18 BRPM | OXYGEN SATURATION: 99 % | SYSTOLIC BLOOD PRESSURE: 115 MMHG | HEART RATE: 68 BPM

## 2022-05-24 PROCEDURE — 71045 X-RAY EXAM CHEST 1 VIEW: CPT

## 2022-05-24 PROCEDURE — 99285 EMERGENCY DEPT VISIT HI MDM: CPT

## 2022-05-24 PROCEDURE — 85025 COMPLETE CBC W/AUTO DIFF WBC: CPT

## 2022-05-24 PROCEDURE — U0005: CPT

## 2022-05-24 PROCEDURE — 93970 EXTREMITY STUDY: CPT

## 2022-05-24 PROCEDURE — 99239 HOSP IP/OBS DSCHRG MGMT >30: CPT

## 2022-05-24 PROCEDURE — 96374 THER/PROPH/DIAG INJ IV PUSH: CPT

## 2022-05-24 PROCEDURE — 93005 ELECTROCARDIOGRAM TRACING: CPT

## 2022-05-24 PROCEDURE — 83880 ASSAY OF NATRIURETIC PEPTIDE: CPT

## 2022-05-24 PROCEDURE — 97110 THERAPEUTIC EXERCISES: CPT

## 2022-05-24 PROCEDURE — 87637 SARSCOV2&INF A&B&RSV AMP PRB: CPT

## 2022-05-24 PROCEDURE — 80053 COMPREHEN METABOLIC PANEL: CPT

## 2022-05-24 PROCEDURE — C8929: CPT

## 2022-05-24 PROCEDURE — 97116 GAIT TRAINING THERAPY: CPT

## 2022-05-24 PROCEDURE — 36415 COLL VENOUS BLD VENIPUNCTURE: CPT

## 2022-05-24 PROCEDURE — 85027 COMPLETE CBC AUTOMATED: CPT

## 2022-05-24 PROCEDURE — 84100 ASSAY OF PHOSPHORUS: CPT

## 2022-05-24 PROCEDURE — 80048 BASIC METABOLIC PNL TOTAL CA: CPT

## 2022-05-24 PROCEDURE — 83735 ASSAY OF MAGNESIUM: CPT

## 2022-05-24 PROCEDURE — U0003: CPT

## 2022-05-24 RX ADMIN — Medication 50 MICROGRAM(S): at 05:40

## 2022-05-24 RX ADMIN — Medication 40 MILLIGRAM(S): at 05:40

## 2022-05-24 RX ADMIN — HEPARIN SODIUM 5000 UNIT(S): 5000 INJECTION INTRAVENOUS; SUBCUTANEOUS at 09:46

## 2022-05-24 RX ADMIN — CARVEDILOL PHOSPHATE 3.12 MILLIGRAM(S): 80 CAPSULE, EXTENDED RELEASE ORAL at 05:40

## 2022-06-09 PROBLEM — Z00.00 ENCOUNTER FOR PREVENTIVE HEALTH EXAMINATION: Status: ACTIVE | Noted: 2022-06-09

## 2022-11-01 NOTE — ED PROVIDER NOTE - CADM POA CENTRAL LINE
Patient seen/examined.  Agree w above note and plan and have discussed plan w house staff.  C/o abdominal pain.  Tmax 99.  Abdomen softly distended, tender, no rebound.  Fluid resuscitation, NPO.  Dr. Perales's team to see    Rishi Meyer MD No

## 2023-01-12 NOTE — PROGRESS NOTE ADULT - PROBLEM/PLAN-3
DISPLAY PLAN FREE TEXT Erivedge Counseling- I discussed with the patient the risks of Erivedge including but not limited to nausea, vomiting, diarrhea, constipation, weight loss, changes in the sense of taste, decreased appetite, muscle spasms, and hair loss.  The patient verbalized understanding of the proper use and possible adverse effects of Erivedge.  All of the patient's questions and concerns were addressed.

## 2023-06-28 NOTE — PROGRESS NOTE ADULT - REASON FOR ADMISSION
sob
sob
Hide Additional Notes?: No
sob
Detail Level: Zone
Detail Level: Detailed

## 2024-06-07 NOTE — PROGRESS NOTE ADULT - SUBJECTIVE AND OBJECTIVE BOX
Chief Complaint   Patient presents with    Back Pain    Office Visit     Date of Injury: 2 years   Initial Treatment Date: 5/29/2024    Date Last Seen: 06/05/2024  Mechanism of Onset: Gradual   Occupation: Retired   Referred by:Zak Mcmahon DC  Primary Care Physician: Delgado Ramos MD  Date informed consent signed: 5/29/2024  Initial pain rating: 10/10  Visit Number of Current Episode: 05  Discharged from care: No      SUBJECTIVE:  The patient is a pleasant 81 year old male that presents to our office today for an evaluation and treatment of mid back and low back pain.    Patient rates his pain today at 0/10 with 10 being worst, and states that the pain is constantly (% of the time).  he describes the pain as stabbing and shooting.    Patient is here for his follow up he states that his pain is feeling pretty good today he states no pain right now. He states he usually starts having pain when he is using a cane or with out a cane he states that when walking with his roller walker he feels like he is standing up straight.      CHIROPRACTIC PATIENT HISTORY:     On roughly what date did your present pain start: 2 years ago   How did your pain start?  Gradually  Over time are your symptoms:Getting worse   Rate how bad your symptoms are; 0 being no pain and 10 unbearable at their:  Worst 10  Best 0  Patient states that the pain is worse when: standing, sitting, and walking  Patient states to reduce pain he has tried heat and ice  Patient has sought chiropractic treatment in the past 25+years ago    Patient’s current work status: Retired   Are you still working? No  How much does your job require you to lift? N/albs.  Are you totally disabled from work? N/a   Are you on work restrictions?retired    Diagnostic studies of area of concern:  X-ray lumbar spine 05/20/2024  Narrative & Impression   XR LUMBAR SPINE 2 OR 3 VIEWS     CLINICAL INFORMATION:  81 years-old Male with history of chronic low back  pain,  CC: fall, MATILDE aden (29 Jul 2019 15:03)    HPI:  89yoM hx HTN, HFpEF, hypothyroidism, chronic lymphedema, presenting with unwitnessed fall at home.      INTERVAL HPI/OVERNIGHT EVENTS: Patient seen and examined lying in bed.  Patient reports feeling better.  Diarrhea improving.  Patient denies any headache, dizziness, SOB, CP, abdominal pain, nausea, vomiting, dysuria.  Other ROS reviewed and are negative.    Vital Signs Last 24 Hrs  T(C): 36.6 (30 Jul 2019 07:44), Max: 36.6 (29 Jul 2019 17:12)  T(F): 97.9 (30 Jul 2019 07:44), Max: 97.9 (30 Jul 2019 07:44)  HR: 60 (30 Jul 2019 07:44) (60 - 72)  BP: 129/63 (30 Jul 2019 07:44) (129/63 - 172/70)  BP(mean): --  RR: 16 (30 Jul 2019 07:44) (16 - 18)  SpO2: 96% (29 Jul 2019 23:16) (95% - 96%)  I&O's Detail    29 Jul 2019 07:01  -  30 Jul 2019 07:00  --------------------------------------------------------  IN:  Total IN: 0 mL    OUT:    Voided: 950 mL  Total OUT: 950 mL    Total NET: -950 mL      30 Jul 2019 07:01  -  30 Jul 2019 13:32  --------------------------------------------------------  IN:    Oral Fluid: 240 mL  Total IN: 240 mL    OUT:    Voided: 100 mL  Total OUT: 100 mL    Total NET: 140 mL      PHYSICAL EXAM:  GENERAL: NAD  HEAD:  Atraumatic, Normocephalic  NECK: Supple, No JVD, Normal thyroid  NERVOUS SYSTEM:  Alert & Oriented X3, Good concentration; generalized weakness  CHEST/LUNG: Coarse BS bilaterally  HEART: Regular rate and rhythm; No murmurs, rubs, or gallops  ABDOMEN: Soft, Nontender, Nondistended; Bowel sounds present  EXTREMITIES:  2+ Peripheral Pulses, bilateral lymphedema                                12.3   8.82  )-----------( 316      ( 29 Jul 2019 07:59 )             40.9     29 Jul 2019 17:26    137    |  102    |  67.0   ----------------------------<  182    4.9     |  24.0   |  1.84     Ca    8.8        29 Jul 2019 17:26  Phos  4.0       29 Jul 2019 07:59  Mg     2.6       29 Jul 2019 07:59        MEDICATIONS  (STANDING):  aspirin enteric coated 325 milliGRAM(s) Oral daily  carvedilol 12.5 milliGRAM(s) Oral every 12 hours  guaiFENesin  milliGRAM(s) Oral every 12 hours  heparin  Injectable 5000 Unit(s) SubCutaneous every 12 hours  hydrALAZINE 25 milliGRAM(s) Oral two times a day  isosorbide   mononitrate ER Tablet (IMDUR) 30 milliGRAM(s) Oral daily  levothyroxine 50 MICROGram(s) Oral daily  predniSONE   Tablet 60 milliGRAM(s) Oral daily  saccharomyces boulardii 250 milliGRAM(s) Oral two times a day    MEDICATIONS  (PRN):  ALBUTerol    0.083% 2.5 milliGRAM(s) Nebulizer every 4 hours PRN Shortness of Breath and/or Wheezing  benzonatate 100 milliGRAM(s) Oral three times a day PRN Cough  loperamide 2 milliGRAM(s) Oral three times a day PRN Diarrhea midline.     COMPARISON: MRI 4/4/2019. Radiographs 8/15/2018     FINDINGS /      IMPRESSION:     1.  Five lumbar-type vertebral bodies.  2.  No acute osseous abnormality.  3.  Chronic mild anterior wedging of L2 and L3. No acute compression  fracture by radiograph. Diffuse osseous demineralization.  4.  Straightening of the lumbar lordosis. Trace retrolisthesis L2 upon L3.  Slight levoconvex curvature lower lumbar spine.  5.  Severe disc height loss at L2-L3 and L3-L4. Moderate disc height loss  at L5-S1. Multilevel endplate osteophytes and facet arthropathy.  6.  Bilateral ureteral stents.     Electronically Signed by: Alfonso Tavares DO  Signed on: 5/29/2024 11:56 AM  Created on Workstation ID: [id number]  Signed on Workstation ID: [id number]     MRI lumbar spine 04/04/2019  EXAM:  MRI LUMBAR SPINE WO CONTRAST     CLINICAL INDICATION: 76 years-old Male, presenting history of SPINAL  STENOSIS, LUMBAR.     COMPARISON:  MRI lumbar spine 11/26/2018.     TECHNIQUE:  Using a 1.5 Paige imaging system, MRI of the lumbar spine  utilizing axial and sagittal T1- and T2-weighted, sagittal STIR and  opposed-phase imaging without gadolinium.     FINDINGS:     Postsurgical Findings:  Post surgical changes from L3-L4 laminectomies.     Vertebrae: 5 lumbar-type vertebral bodies are present.  Vertebral body  heights are preserved.   Normal alignment.       Marrow:  Endplate degenerative marrow signal changes at multiple levels,  advanced at L3-L4, similar to the prior study. Small endplate osteophytes  at multiple levels. Marrow edema at L3-L4 is also not significant changed,  likely degenerative in etiology. A STIR hyperintense focus in the T11  vertebral body with peripheral T1 hyperintensity and loss of signal on  opposed phase imaging is unchanged and likely represents an atypical  osseous hemangioma. No destructive osseous lesions.     Discs:  Loss of signal and severe disc height loss at L2-L3, L3-L4 and  L5-S1, unchanged.      Spinal Canal:  Conus medullaris terminates at L1.  Distal cord and nerve  roots are of normal signal intensity.      Paraspinal soft tissues:  Marked fatty atrophy of the paraspinal  musculature.     Degenerative changes:     T12-L1:  No significant disc herniation or central canal stenosis.  No  significant neural foraminal compromise.     L1-L2: Mild, diffuse disc bulge with mild bilateral facet hypertrophy  resulting in minimal left neural foraminal narrowing.  No spinal canal  stenosis or right neural foraminal narrowing.     L2-L3:  Mild to moderate, diffuse disc bulge with mild to moderate  bilateral facet hypertrophy and prominence of ligamentum flavum resulting  in mild effacement of the ventral thecal sac and moderate to severe  bilateral neural foraminal narrowing, unchanged.          L3-L4:  Status post posterior decompression. Moderate, diffuse disc bulge  with moderate bilateral facet hypertrophy resulting in mild left lateral  recess narrowing and moderate to severe right and severe left neural  foraminal narrowing.          L4-L5:  Mild to moderate disc bulge asymmetric to the right as well as mild  to moderate bilateral facet hypertrophy resulting in mild to moderate right  and moderate left neural foraminal narrowings.  No spinal canal stenosis.     L5-S1:  Mild, diffuse disc bulge with moderate bilateral facet hypertrophy  resulting in moderate to severe right and moderate left neural foraminal  narrowing.  No spinal canal stenosis.      Other: A thinly septated T2 hyperintensity in the spleen is incompletely  evaluated, but favored to be benign. A 1.4 cm T2 hyperintensity in the  right kidney was also present on the prior study, likely representing a  cyst.        IMPRESSION:       1.  Multilevel degenerative changes, as described. No significant spinal  canal stenosis. At L2-L3, there is moderate to severe bilateral neural  foraminal narrowing. At L3-L4, there is moderate to severe right and  severe  left neural foraminal narrowings. At L5-S1, there is moderate to severe  right and moderate left neural foraminal narrowings. These are not  significantly changed from the prior study.     2.  Postsurgical changes from L3-L4 laminectomies.     Hospitalizations:  None      HEALTH HISTORY:      HTN (hypertension)                                            Dyslipidemia                                                  Hypothyroid                                                   Low testosterone                                              Other and unspecified hyperlipidemia                          RAD (reactive airway disease) (CMD)                           Diabetes mellitus  (CMD)                                        Comment: diet controlled    Chronic pain                                                  Arthritis                                                     Pneumonia                                                     Gastroesophageal reflux disease                               Hepatitis A                                                   Fracture                                                        Comment: right femur    Personal history of poliomyelitis                             Sleep apnea                                                     Comment: CPAP +9, FFM    COPD (chronic obstructive pulmonary disease)  *               Knee gives out, left                                          Rotator cuff tear arthropathy of right shoulder 08/31/2021    Closed dislocation of left shoulder             09/23/2021  The patient's family health history includes:  None    SOCIAL HISTORY:  Patient completed Chiropractic Patient History and Chiropractic Systems Review.  These were reviewed with the patient.    OBJECTIVE FINDINGS:     Objective Rating Index is    Patient Emotional screening was completed 5/29/2024; [employer] Pain Supplemental Questionnaire score was 8/40.    OWS:34%  NDI: 20%    During  the past 24 hours, how has pain interfered with normal activities: 5/10  During the past 24 hours, how has pain interfered with your sleep: 0/10  During the past 24 hours, how has pain affected your mood: 2/10  During the past 24 hours, how has pain contributed to your stress: 1/10    Problem focus examination revealed:    (L & P-spine) Lumbar spine facet joint function is within normal limits except for his L5 and S1  facet joints that exhibited limited passive range of motion and segmental restriction and tenderness on palpation; sacroiliac joint function is within normal limits except for his right sacroiliac joint that exhibited limited passive range of motion and joint restriction; The following muscles were examined for flexibility and tone at rest; right piriformis, left piriformis, right hamstring, left hamstring, right lumbar paraspinals, left lumbar paraspinals, right quadriceps, and left quadriceps. These muscles were found to be within normal limits except for hisright piriformis, left piriformis, right lumbar paraspinals, and left lumbar paraspinals muscle(s) that exhibited limited flexibility and were hypertonic at rest.    Orthopedic/Neurological tests:  There were no vitals taken for this visit.  None    Assessment:    1. Somatic dysfunction of lumbar region    2. Chronic bilateral low back pain with left-sided sciatica    3. Somatic dysfunction of sacral region    4. Muscle spasm    5. Somatic dysfunction of pelvis region    6. Foot drop, left    7. DDD (degenerative disc disease), lumbar    8. Facet hypertrophy of lumbar region                  Plan:  Patient was evaluated and then treated with manipulation to his  lumbar and pelvic spine via nickerson drop technique, lumbar and sacral spine via Diop distraction technique to improve function and passive range of motion of facet joints. Patient also treated with contract/relax stretch to muscle noted as taut in objective findings to improve  flexibility and decrease strain to spinal structures. Patient also treated with lumbar distraction x 5 minutes to stretch lumbar paraspinal muscle and centralize possible contained migration of lumbar intervertebral nucleus.    Rehabilitation/Modalities:  None      Goal of care is to improve muscular and skeletal function and provide symptom relief. Patient responded well to the treatment today. Patient rates pain at 0/10 immediately after the treatment today. Patient is to return in 2-3 days for continued care and treatment. Care is planned at 3x /week x 2 weeks, 2x/week for 2 weeks, 1x/week for 2 weeks.     Total exam and treatment time was 10 minutes.

## 2024-07-15 NOTE — PATIENT PROFILE ADULT - NSFALLSECTIONLABEL_GEN_A_CORE
Goal Outcome Evaluation:      Plan of Care Reviewed With: patient    Overall Patient Progress: no change    Outcome Evaluation: Pt. alert oriented x4, calls appropriately. Denied pain SOB and chest pain, refused scheduled tylenol at bedtime. Regular thin, pills whole with apple sauce. On scheduled TF 4PM-8AM, YOHANA gramajo lumen PICC patent, saline locked. Slept in between cares. Infrequent cough observed. Old trach site dressing changed. Continent of both LBM 7/13/24. Safety rounds completed. Bed alarm on. Call light within reach. Will continue the current POC.       .

## 2024-12-06 NOTE — PROGRESS NOTE ADULT - ASSESSMENT
89 yo M presenting from [facility] for dyspnea. PMH HTN, hypothyroidism, HFpEF, chronic lymphedema presented with sob and increased edema.  Loop  diuretics were held due to worsening renal function.  Patient was maintained on aldactone only  Found to be hyperkalemic posterior cervical L/posterior cervical R/anterior cervical L/anterior cervical R

## 2025-07-24 NOTE — ED PROVIDER NOTE - PRINCIPAL DIAGNOSIS
Please review the message above, Patient Dexcom was downloaded and scanned into the chart. Thank you   Pulmonary edema